# Patient Record
Sex: MALE | Race: WHITE | Employment: FULL TIME | URBAN - METROPOLITAN AREA
[De-identification: names, ages, dates, MRNs, and addresses within clinical notes are randomized per-mention and may not be internally consistent; named-entity substitution may affect disease eponyms.]

---

## 2018-03-07 ENCOUNTER — HOSPITAL ENCOUNTER (EMERGENCY)
Age: 33
Discharge: HOME | End: 2018-03-07
Attending: EMERGENCY MEDICINE
Payer: SELF-PAY

## 2018-03-07 VITALS
BODY MASS INDEX: 25.11 KG/M2 | HEIGHT: 67 IN | HEART RATE: 113 BPM | SYSTOLIC BLOOD PRESSURE: 141 MMHG | RESPIRATION RATE: 16 BRPM | WEIGHT: 160 LBS | DIASTOLIC BLOOD PRESSURE: 82 MMHG

## 2018-03-07 DIAGNOSIS — S01.01XA LACERATION OF SCALP, INITIAL ENCOUNTER: Primary | ICD-10-CM

## 2018-03-07 PROCEDURE — 77030008031

## 2018-03-07 PROCEDURE — 99282 EMERGENCY DEPT VISIT SF MDM: CPT | Performed by: EMERGENCY MEDICINE

## 2018-03-07 PROCEDURE — 75810000293 HC SIMP/SUPERF WND  RPR: Performed by: EMERGENCY MEDICINE

## 2018-03-08 NOTE — ED NOTES
Pt came back to department to \"get gloves he forgot\". Allowed me to give him his paperwork and explain removal schedule and s&sx of infection. I have reviewed discharge instructions with the patient. The patient verbalized understanding. Patient left ED via Discharge Method: ambulatory to Home with self. Opportunity for questions and clarification provided. Patient given 0 scripts. To continue your aftercare when you leave the hospital, you may receive an automated call from our care team to check in on how you are doing. This is a free service and part of our promise to provide the best care and service to meet your aftercare needs.  If you have questions, or wish to unsubscribe from this service please call [phone]. Thank you for Choosing our [facility].

## 2018-03-08 NOTE — ED NOTES
Pt was leaving as I approached with discharge papers. Pt states I came for staples and I got them that's all I want, I'm outta here. Asked pt to allow me to take vitals and give paperwork. Pt stated no and left ER.

## 2018-03-08 NOTE — ED PROVIDER NOTES
HPI Comments: Patient states he was struck on the head by a some loose electrical conduit earlier this afternoon. He denies loss of consciousness, states the area bled a moderate amount and stopped. He denies any nausea or vomiting, has only some mild pain at the site of incision. Elements of this note were created using speech recognition software. As such, errors of speech recognition may be present. Patient is a 26 y.o. male presenting with skin laceration. The history is provided by the patient. Laceration           History reviewed. No pertinent past medical history. Past Surgical History:   Procedure Laterality Date    HX TONSIL AND ADENOIDECTOMY           No family history on file. Social History     Social History    Marital status: SINGLE     Spouse name: N/A    Number of children: N/A    Years of education: N/A     Occupational History    Not on file. Social History Main Topics    Smoking status: Current Every Day Smoker     Packs/day: 0.25    Smokeless tobacco: Not on file    Alcohol use No    Drug use: Not on file    Sexual activity: No     Other Topics Concern    Not on file     Social History Narrative    No narrative on file         ALLERGIES: Pcn [penicillins]    Review of Systems   Constitutional: Negative for chills and fever. Gastrointestinal: Negative for nausea and vomiting. All other systems reviewed and are negative. Vitals:    03/07/18 1956   BP: 141/82   Pulse: (!) 113   Resp: 16   Weight: 72.6 kg (160 lb)   Height: 5' 7\" (1.702 m)            Physical Exam   Constitutional: He is oriented to person, place, and time. He appears well-developed and well-nourished. HENT:   Head: Normocephalic. 2 cm linear sharp laceration left parietal scalp as indicated   Eyes: Conjunctivae are normal. Pupils are equal, round, and reactive to light. Neck: Normal range of motion. Neck supple. Musculoskeletal: He exhibits no edema or tenderness. Neurological: He is alert and oriented to person, place, and time. Skin: Skin is warm and dry. Psychiatric: He has a normal mood and affect. His behavior is normal.   Nursing note and vitals reviewed. MDM  Number of Diagnoses or Management Options  Laceration of scalp, initial encounter: new and does not require workup  Risk of Complications, Morbidity, and/or Mortality  Presenting problems: moderate  Diagnostic procedures: moderate  Management options: moderate    Patient Progress  Patient progress: improved        ED Course       Wound Repair  Date/Time: 3/7/2018 9:52 PM  Performed by: attendingPreparation: skin prepped with ChloraPrep  Pre-procedure re-eval: Immediately prior to the procedure, the patient was reevaluated and found suitable for the planned procedure and any planned medications. Time out: Immediately prior to the procedure a time out was called to verify the correct patient, procedure, equipment, staff and marking as appropriate. .  Location details: scalp  Wound length:2.5 cm or less  Foreign bodies: no foreign bodies  Irrigation solution: saline  Irrigation method: syringe  Debridement: none  Skin closure: staples  Number of sutures: 3  Technique: simple  Approximation: close  Patient tolerance: Patient tolerated the procedure well with no immediate complications  My total time at bedside, performing this procedure was 1-15 minutes.

## 2018-03-08 NOTE — DISCHARGE INSTRUCTIONS
Cuts: Care Instructions  Your Care Instructions  A cut can happen anywhere on your body. Stitches, staples, skin adhesives, or pieces of tape called Steri-Strips are sometimes used to keep the edges of a cut together and help it heal. Steri-Strips can be used by themselves or with stitches or staples. Sometimes cuts are left open. If the cut went deep and through the skin, the doctor may have closed the cut in two layers. A deeper layer of stitches brings the deep part of the cut together. These stitches will dissolve and don't need to be removed. The upper layer closure, which could be stitches, staples, Steri-Strips, or adhesive, is what you see on the cut. A cut is often covered by a bandage. The doctor has checked you carefully, but problems can develop later. If you notice any problems or new symptoms, get medical treatment right away. Follow-up care is a key part of your treatment and safety. Be sure to make and go to all appointments, and call your doctor if you are having problems. It's also a good idea to know your test results and keep a list of the medicines you take. How can you care for yourself at home? If a cut is open or closed  · Prop up the sore area on a pillow anytime you sit or lie down during the next 3 days. Try to keep it above the level of your heart. This will help reduce swelling. · Keep the cut dry for the first 24 to 48 hours. After this, you can shower if your doctor okays it. Pat the cut dry. · Don't soak the cut, such as in a bathtub. Your doctor will tell you when it's safe to get the cut wet. · After the first 24 to 48 hours, clean the cut with soap and water 2 times a day unless your doctor gives you different instructions. ¨ Don't use hydrogen peroxide or alcohol, which can slow healing. ¨ You may cover the cut with a thin layer of petroleum jelly and a nonstick bandage.   ¨ If the doctor put a bandage over the cut, put on a new bandage after cleaning the cut or if the bandage gets wet or dirty. · Avoid any activity that could cause your cut to reopen. · Be safe with medicines. Read and follow all instructions on the label. ¨ If the doctor gave you a prescription medicine for pain, take it as prescribed. ¨ If you are not taking a prescription pain medicine, ask your doctor if you can take an over-the-counter medicine. If the cut is closed with stitches, staples, or Steri-Strips  · Follow the above instructions for open or closed cuts. · Do not remove the stitches or staples on your own. Your doctor will tell you when to come back to have the stitches or staples removed. · Leave Steri-Strips on until they fall off. If the cut is closed with a skin adhesive  · Follow the above instructions for open or closed cuts. · Leave the skin adhesive on your skin until it falls off on its own. This may take 5 to 10 days. · Do not scratch, rub, or pick at the adhesive. · Do not put the sticky part of a bandage directly on the adhesive. · Do not put any kind of ointment, cream, or lotion over the area. This can make the adhesive fall off too soon. Do not use hydrogen peroxide or alcohol, which can slow healing. When should you call for help? Call your doctor now or seek immediate medical care if:  ? · You have new pain, or your pain gets worse. ? · The skin near the cut is cold or pale or changes color. ? · You have tingling, weakness, or numbness near the cut.   ? · The cut starts to bleed, and blood soaks through the bandage. Oozing small amounts of blood is normal.   ? · You have trouble moving the area near the cut.   ? · You have symptoms of infection, such as:  ¨ Increased pain, swelling, warmth, or redness around the cut. ¨ Red streaks leading from the cut. ¨ Pus draining from the cut. ¨ A fever. ? Watch closely for changes in your health, and be sure to contact your doctor if:  ? · The cut reopens. ? · You do not get better as expected.    Where can you learn more? Go to http://laly-jurgen.info/. Enter M735 in the search box to learn more about \"Cuts: Care Instructions. \"  Current as of: March 20, 2017  Content Version: 11.4  © 7199-5753 [employer]. Care instructions adapted under license by [employer] (which disclaims liability or warranty for this information). If you have questions about a medical condition or this instruction, always ask your healthcare professional. [address] any warranty or liability for your use of this information.

## 2018-03-08 NOTE — ED TRIAGE NOTES
Pt presents to ER after he claims a pipe hit his L dorsal head at [phone] today at work, clotted hair w/ no active bleeding, Td UTD in 2015, pt denies any LOC.

## 2018-03-26 ENCOUNTER — HOSPITAL ENCOUNTER (INPATIENT)
Age: 33
LOS: 21 days | Discharge: HOME | DRG: 674 | End: 2018-04-16
Attending: EMERGENCY MEDICINE | Admitting: INTERNAL MEDICINE
Payer: SELF-PAY

## 2018-03-26 ENCOUNTER — APPOINTMENT (OUTPATIENT)
Dept: GENERAL RADIOLOGY | Age: 33
DRG: 674 | End: 2018-03-26
Attending: EMERGENCY MEDICINE
Payer: SELF-PAY

## 2018-03-26 ENCOUNTER — APPOINTMENT (OUTPATIENT)
Dept: ULTRASOUND IMAGING | Age: 33
DRG: 674 | End: 2018-03-26
Attending: INTERNAL MEDICINE
Payer: SELF-PAY

## 2018-03-26 DIAGNOSIS — N17.9 ACUTE RENAL FAILURE, UNSPECIFIED ACUTE RENAL FAILURE TYPE (HCC): Primary | ICD-10-CM

## 2018-03-26 PROBLEM — F11.10 HEROIN ABUSE (HCC): Status: ACTIVE | Noted: 2018-03-26

## 2018-03-26 PROBLEM — Z72.0 TOBACCO ABUSE: Status: ACTIVE | Noted: 2018-03-26

## 2018-03-26 LAB
ANION GAP SERPL CALC-SCNC: 14 MMOL/L (ref 7–16)
BUN SERPL-MCNC: 67 MG/DL (ref 6–23)
CALCIUM SERPL-MCNC: 8.5 MG/DL (ref 8.3–10.4)
CHLORIDE SERPL-SCNC: 97 MMOL/L (ref 98–107)
CK SERPL-CCNC: 1419 U/L (ref 21–215)
CO2 SERPL-SCNC: 25 MMOL/L (ref 21–32)
CREAT SERPL-MCNC: 4.92 MG/DL (ref 0.8–1.5)
ERYTHROCYTE [DISTWIDTH] IN BLOOD BY AUTOMATED COUNT: 12.5 % (ref 11.9–14.6)
GLUCOSE SERPL-MCNC: 96 MG/DL (ref 65–100)
HCT VFR BLD AUTO: 43.8 % (ref 41.1–50.3)
HGB BLD-MCNC: 15.9 G/DL (ref 13.6–17.2)
MCH RBC QN AUTO: 31.8 PG (ref 26.1–32.9)
MCHC RBC AUTO-ENTMCNC: 36.3 G/DL (ref 31.4–35)
MCV RBC AUTO: 87.6 FL (ref 79.6–97.8)
PLATELET # BLD AUTO: 148 K/UL (ref 150–450)
PMV BLD AUTO: 9.7 FL (ref 10.8–14.1)
POTASSIUM SERPL-SCNC: 4.4 MMOL/L (ref 3.5–5.1)
PROCALCITONIN SERPL-MCNC: 12.5 NG/ML
RBC # BLD AUTO: 5 M/UL (ref 4.23–5.67)
SODIUM SERPL-SCNC: 136 MMOL/L (ref 136–145)
WBC # BLD AUTO: 9.9 K/UL (ref 4.3–11.1)

## 2018-03-26 PROCEDURE — 96360 HYDRATION IV INFUSION INIT: CPT | Performed by: EMERGENCY MEDICINE

## 2018-03-26 PROCEDURE — 84145 PROCALCITONIN (PCT): CPT | Performed by: EMERGENCY MEDICINE

## 2018-03-26 PROCEDURE — 71045 X-RAY EXAM CHEST 1 VIEW: CPT

## 2018-03-26 PROCEDURE — 76770 US EXAM ABDO BACK WALL COMP: CPT

## 2018-03-26 PROCEDURE — 65270000029 HC RM PRIVATE

## 2018-03-26 PROCEDURE — 99285 EMERGENCY DEPT VISIT HI MDM: CPT | Performed by: EMERGENCY MEDICINE

## 2018-03-26 PROCEDURE — 74011250636 HC RX REV CODE- 250/636: Performed by: INTERNAL MEDICINE

## 2018-03-26 PROCEDURE — 82550 ASSAY OF CK (CPK): CPT | Performed by: EMERGENCY MEDICINE

## 2018-03-26 PROCEDURE — 80048 BASIC METABOLIC PNL TOTAL CA: CPT | Performed by: EMERGENCY MEDICINE

## 2018-03-26 PROCEDURE — 73140 X-RAY EXAM OF FINGER(S): CPT

## 2018-03-26 PROCEDURE — 36415 COLL VENOUS BLD VENIPUNCTURE: CPT | Performed by: EMERGENCY MEDICINE

## 2018-03-26 PROCEDURE — 74011250636 HC RX REV CODE- 250/636: Performed by: EMERGENCY MEDICINE

## 2018-03-26 PROCEDURE — 85027 COMPLETE CBC AUTOMATED: CPT | Performed by: EMERGENCY MEDICINE

## 2018-03-26 PROCEDURE — 74011250637 HC RX REV CODE- 250/637: Performed by: EMERGENCY MEDICINE

## 2018-03-26 RX ORDER — ONDANSETRON 2 MG/ML
4 INJECTION INTRAMUSCULAR; INTRAVENOUS
Status: DISCONTINUED | OUTPATIENT
Start: 2018-03-26 | End: 2018-04-16 | Stop reason: HOSPADM

## 2018-03-26 RX ORDER — ONDANSETRON 4 MG/1
4 TABLET, ORALLY DISINTEGRATING ORAL
Status: COMPLETED | OUTPATIENT
Start: 2018-03-26 | End: 2018-03-26

## 2018-03-26 RX ORDER — ACETAMINOPHEN 325 MG/1
650 TABLET ORAL
Status: DISCONTINUED | OUTPATIENT
Start: 2018-03-26 | End: 2018-04-16 | Stop reason: HOSPADM

## 2018-03-26 RX ORDER — SODIUM CHLORIDE 0.9 % (FLUSH) 0.9 %
5-10 SYRINGE (ML) INJECTION EVERY 8 HOURS
Status: DISCONTINUED | OUTPATIENT
Start: 2018-03-26 | End: 2018-04-16 | Stop reason: HOSPADM

## 2018-03-26 RX ORDER — SODIUM CHLORIDE 9 MG/ML
250 INJECTION, SOLUTION INTRAVENOUS CONTINUOUS
Status: DISCONTINUED | OUTPATIENT
Start: 2018-03-26 | End: 2018-03-27

## 2018-03-26 RX ORDER — HEPARIN SODIUM 5000 [USP'U]/ML
5000 INJECTION, SOLUTION INTRAVENOUS; SUBCUTANEOUS EVERY 8 HOURS
Status: DISCONTINUED | OUTPATIENT
Start: 2018-03-26 | End: 2018-04-16 | Stop reason: HOSPADM

## 2018-03-26 RX ORDER — SODIUM CHLORIDE 0.9 % (FLUSH) 0.9 %
5-10 SYRINGE (ML) INJECTION AS NEEDED
Status: DISCONTINUED | OUTPATIENT
Start: 2018-03-26 | End: 2018-04-16 | Stop reason: HOSPADM

## 2018-03-26 RX ORDER — SODIUM CHLORIDE, SODIUM LACTATE, POTASSIUM CHLORIDE, CALCIUM CHLORIDE 600; 310; 30; 20 MG/100ML; MG/100ML; MG/100ML; MG/100ML
150 INJECTION, SOLUTION INTRAVENOUS CONTINUOUS
Status: DISCONTINUED | OUTPATIENT
Start: 2018-03-26 | End: 2018-03-27

## 2018-03-26 RX ORDER — SODIUM CHLORIDE 9 MG/ML
1000 INJECTION, SOLUTION INTRAVENOUS ONCE
Status: COMPLETED | OUTPATIENT
Start: 2018-03-26 | End: 2018-03-26

## 2018-03-26 RX ADMIN — HEPARIN SODIUM 5000 UNITS: 5000 INJECTION, SOLUTION INTRAVENOUS; SUBCUTANEOUS at 22:53

## 2018-03-26 RX ADMIN — Medication 10 ML: at 22:54

## 2018-03-26 RX ADMIN — ONDANSETRON 4 MG: 4 TABLET, ORALLY DISINTEGRATING ORAL at 08:49

## 2018-03-26 RX ADMIN — SODIUM CHLORIDE 1000 ML: 900 INJECTION, SOLUTION INTRAVENOUS at 08:48

## 2018-03-26 RX ADMIN — SODIUM CHLORIDE, SODIUM LACTATE, POTASSIUM CHLORIDE, AND CALCIUM CHLORIDE 150 ML/HR: 600; 310; 30; 20 INJECTION, SOLUTION INTRAVENOUS at 11:49

## 2018-03-26 RX ADMIN — SODIUM CHLORIDE, SODIUM LACTATE, POTASSIUM CHLORIDE, AND CALCIUM CHLORIDE 150 ML/HR: 600; 310; 30; 20 INJECTION, SOLUTION INTRAVENOUS at 22:54

## 2018-03-26 NOTE — IP AVS SNAPSHOT
[address] 
[phone] Patient: Ashlee Marcus 
MRN: [id number] [facility]:[birth year]15 About your hospitalization You were admitted on:  March 26, 2018 You last received care in the:  [facility] 6 MED SURG You were discharged on:  April 16, 2018 Why you were hospitalized Your primary diagnosis was:  Arf (Acute Renal Failure) (Hcc) Your diagnoses also included:  Heroin Abuse, Tobacco Abuse, Hepatitis, Rhabdomyolysis, Chronic Hepatitis C Without Hepatic Coma (Hcc), Gerd (Gastroesophageal Reflux Disease) Follow-up Information Follow up With Details Comments Contact Info [address] [place] [address]   [phone] 
call on 4/18/18 to let them know you would like to come back [facility]  one week folow up appt Ericajuan Kauffman [phone] 
[phone] Discharge Orders Procedure Order Date Status Priority Quantity Spec Type Associated Dx DISCHARGE INSTRUCTIONS 04/16/18 0736 Normal Routine 1 Comments: If worsened, call your doctor or return to hospital.  
When follow up with your doctor, make sure that your doctor is aware of this admission and review hospital record and follow up on lab results for continuity of care. Also, review your medications with your doctor for possible need of adjustment or refills. Drink good amount of water daily (At least 2-3 quarts per day). A check estelle indicates which time of day the medication should be taken. My Medications START taking these medications Instructions Each Dose to Equal  
 Morning Noon Evening Bedtime  
 amLODIPine 10 mg tablet Commonly known as:  Pooja Attila Your next dose is:  4-17 Take 1 Tab by mouth daily for 15 days. Indications: hypertension 10 mg  
    
   
   
   
  
 sertraline 25 mg tablet Commonly known as:  ZOLOFT Your next dose is:  4-17 Take 1 Tab by mouth daily for 15 days. Indications: Generalized Anxiety Disorder 25 mg Where to Get Your Medications Information on where to get these meds will be given to you by the nurse or doctor. ! Ask your nurse or doctor about these medications  
  amLODIPine 10 mg tablet  
 sertraline 25 mg tablet Discharge Instructions DISCHARGE SUMMARY from Nurse PATIENT INSTRUCTIONS: 
 
 
F-face looks uneven A-arms unable to move or move unevenly S-speech slurred or non-existent T-time-call 911 as soon as signs and symptoms begin-DO NOT go Back to bed or wait to see if you get better-TIME IS BRAIN. Warning Signs of HEART ATTACK Call 911 if you have these symptoms: 
? Chest discomfort. Most heart attacks involve discomfort in the center of the chest that lasts more than a few minutes, or that goes away and comes back. It can feel like uncomfortable pressure, squeezing, fullness, or pain. ? Discomfort in other areas of the upper body. Symptoms can include pain or discomfort in one or both arms, the back, neck, jaw, or stomach. ? Shortness of breath with or without chest discomfort. ? Other signs may include breaking out in a cold sweat, nausea, or lightheadedness. Don't wait more than five minutes to call [address]! Fast action can save your life. Calling 911 is almost always the fastest way to get lifesaving treatment. Emergency Medical Services staff can begin treatment when they arrive  up to an hour sooner than if someone gets to the hospital by car. The discharge information has been reviewed with the patient.   The patient verbalized understanding. Discharge medications reviewed with the patient and appropriate educational materials and side effects teaching were provided. ___________________________________________________________________________________________________________________________________ MyChart Announcement We are excited to announce that we are making your provider's discharge notes available to you in Triparazzi. You will see these notes when they are completed and signed by the physician that discharged you from your recent hospital stay. If you have any questions or concerns about any information you see in Triparazzi, please call the Health Information Department where you were seen or reach out to your Primary Care Provider for more information about your plan of care. Introducing [facility]! Alec Rose introduces Triparazzi patient portal. Now you can access parts of your medical record, email your doctor's office, and request medication refills online. 1. In your internet browser, go to [email or web address] 2. Click on the First Time User? Click Here link in the Sign In box. You will see the New Member Sign Up page. 3. Enter your Triparazzi Access Code exactly as it appears below. You will not need to use this code after youve completed the sign-up process. If you do not sign up before the expiration date, you must request a new code. · Triparazzi Access Code: [email or web address] Expires: 6/5/2018  8:43 PM 
 
4. Enter the last four digits of your Social Security Number (xxxx) and Date of Birth (mm/dd/yyyy) as indicated and click Submit. You will be taken to the next sign-up page. 5. Create a Railpodt ID. This will be your Triparazzi login ID and cannot be changed, so think of one that is secure and easy to remember. 6. Create a Triparazzi password. You can change your password at any time. 7. Enter your Password Reset Question and Answer.  This can be used at a later time if you forget your password. 8. Enter your e-mail address. You will receive e-mail notification when new information is available in [address] 9. Click Sign Up. You can now view and download portions of your medical record. 10. Click the Download Summary menu link to download a portable copy of your medical information. If you have questions, please visit the Frequently Asked Questions section of the MicroEdget website. Remember, [employer] is NOT to be used for urgent needs. For medical emergencies, dial 911. Now available from your iPhone and Android! Introducing Asher Rogers As a [facility] patient, I wanted to make you aware of our electronic visit tool called Asher Rogers. [employer] allows you to connect within minutes with a medical provider 24 hours a day, seven days a week via a mobile device or tablet or logging into a secure website from your computer. You can access Asher Rogers from anywhere in the [place]. A virtual visit might be right for you when you have a simple condition and feel like you just dont want to get out of bed, or cant get away from work for an appointment, when your regular [facility] provider is not available (evenings, weekends or holidays), or when youre out of town and need minor care. Electronic visits cost only $49 and if the [employer] provider determines a prescription is needed to treat your condition, one can be electronically transmitted to a nearby pharmacy*. Please take a moment to enroll today if you have not already done so. The enrollment process is free and takes just a few minutes. To enroll, please download the [employer] erasto to your tablet or phone, or visit [email or web address] to enroll on your computer.    
And, as an [address] [place] [address] patient with a interspireSubmit account, the results of your visits will be scanned into your electronic medical record and your primary care provider will be able to view the scanned results. We urge you to continue to see your regular Rebecca Bussin provider for your ongoing medical care. And while your primary care provider may not be the one available when you seek a RealPage virtual visit, the peace of mind you get from getting a real diagnosis real time can be priceless. For more information on RealPage, view our Frequently Asked Questions (FAQs) at [email or web address]. Sincerely, 
 
Alesia Sibley MD 
Chief Medical Officer [employer] *:  certain medications cannot be prescribed via RealPage Providers Seen During Your Hospitalization Provider Specialty Primary office phone Adam Page MD Emergency Medicine [phone] Roopa Penaloza MD Internal Medicine [phone] Immunizations Administered for This Admission Name Date  
 TB Skin Test (PPD) Intradermal  Deferred (),  Deferred (), 3/30/2018 Your Primary Care Physician (PCP) Primary Care Physician Office Phone Office Fax NONE ** None ** ** None ** You are allergic to the following Allergen Reactions Pcn (Penicillins) Anaphylaxis Recent Documentation Height Weight BMI Smoking Status 1.702 m 67 kg 23.15 kg/m2 Current Every Day Smoker Emergency Contacts Name Discharge Info Relation Home Work Mobile [facility]  Sister [23] [phone] Patient Belongings The following personal items are in your possession at time of discharge: 
  Dental Appliances: None         Home Medications: None   Jewelry: None  Clothing: At bedside, Footwear, Pants, Shirt    Other Valuables: Pocket knife, With patient Discharge Instructions Attachments/References RENAL FAILURE: ACUTE (ENGLISH) Patient Handouts Acute Kidney Injury: Care Instructions Your Care Instructions Acute kidney injury (AISSATOU) is a sudden decrease in kidney function. This can happen over a period of hours, days or, in some cases, weeks. AISSATOU used to be called acute renal failure, but kidney failure doesn't always happen with AISSATOU. Common causes of AISSATOU are dehydration, blood loss, and medicines. When AISSATOU happens, the kidneys have trouble removing waste and excess fluids from the body. The waste and fluids build up and become harmful. Kidney function may return to normal if the cause of AISSATOU is treated quickly. Your chance of a full recovery depends on what caused the problem, how quickly the cause was treated, and what other medical problems you have. A machine may be used to help your kidneys remove waste and fluids for a short period of time. This is called dialysis. Follow-up care is a key part of your treatment and safety. Be sure to make and go to all appointments, and call your doctor if you are having problems. It's also a good idea to know your test results and keep a list of the medicines you take. How can you care for yourself at home? · Talk to your doctor about how much fluid you should drink. · Eat a balanced diet. Talk to your doctor or a dietitian about what type of diet may be best for you. You may need to limit sodium, potassium, and phosphorus. · If you need dialysis, follow the instructions and schedule for dialysis that your doctor gives you. · Do not smoke. Smoking can make your condition worse. If you need help quitting, talk to your doctor about stop-smoking programs and medicines. These can increase your chances of quitting for good. · Do not drink alcohol. · Review all of your medicines with your doctor. Do not take any medicines, including nonsteroidal anti-inflammatory drugs (NSAIDs) such as ibuprofen (Advil, Motrin) or naproxen (Aleve), unless your doctor says it is safe for you to do so. · Make sure that anyone treating you for any health problem knows that you have had AISSATOU. When should you call for help? Call 911 anytime you think you may need emergency care. For example, call if: 
? · You passed out (lost consciousness). ?Call your doctor now or seek immediate medical care if: 
? · You have new or worse nausea and vomiting. ? · You have much less urine than normal, or you have no urine. ? · You are feeling confused or cannot think clearly. ? · You have new or more blood in your urine. ? · You have new swelling. ? · You are dizzy or lightheaded, or feel like you may faint. ? Watch closely for changes in your health, and be sure to contact your doctor if: 
? · You do not get better as expected. Where can you learn more? Go to http://laly-jurgen.info/. Enter I391 in the search box to learn more about \"Acute Kidney Injury: Care Instructions. \" Current as of: May 12, 2017 Content Version: 11.4 © 7477-7660 [employer]. Care instructions adapted under license by [employer] (which disclaims liability or warranty for this information). If you have questions about a medical condition or this instruction, always ask your healthcare professional. [address] any warranty or liability for your use of this information. Please provide this summary of care documentation to your next provider. Signatures-by signing, you are acknowledging that this After Visit Summary has been reviewed with you and you have received a copy. Patient Signature:  ____________________________________________________________ Date:  ____________________________________________________________  
  
[facility] Provider Signature:  ____________________________________________________________ Date:  ____________________________________________________________

## 2018-03-26 NOTE — PROGRESS NOTES
Met with patient at bedside. Patient is alert and oriented. Patient stated he was living with a friend but now does not plan on returning to friend's home at discharge. CM asked patient where he was planning to go at discharge and he stated, \"I don't know right now, it's up in the air. \" CM asked patient if he would like information regarding drug rehab facilities and patient stated that he did. Provided patient with information for the following programs:    [facility], [facility], [facility]. Pt stated he has called [facility] several times at 8:30am and has not received much help. Patient to review all program information provided and CM will follow up tomorrow.

## 2018-03-26 NOTE — PROGRESS NOTES
TRANSFER - IN REPORT:    Verbal report received from ELEONORA Cai on Maia Aponte  being received from ED for routine progression of care      Report consisted of patients Situation, Background, Assessment and   Recommendations(SBAR). Information from the following report(s) SBAR, Kardex and ED Summary was reviewed with the receiving nurse. Opportunity for questions and clarification was provided. Assessment completed upon patients arrival to unit and care assumed.
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
## 2018-03-26 NOTE — ED NOTES
TRANSFER - OUT REPORT:    Verbal report given to 6th floor nurse on Benoit Ingram  being transferred to 6th floor for routine progression of care       Report consisted of patients Situation, Background, Assessment and   Recommendations(SBAR). Information from the following report(s) ED Summary was reviewed with the receiving nurse. Lines:   Peripheral IV 03/26/18 Right Antecubital (Active)        Opportunity for questions and clarification was provided.       Patient transported with:

## 2018-03-26 NOTE — H&P
HOSPITALIST H&P/CONSULT  NAME:  Henry Plummer   Age:  26 y.o.  :   1985   MRN:   [id number]  PCP: None  Consulting MD:  Treatment Team: Attending Provider: Elizabeth Bailey MD; Primary Nurse: Mya Vallejo RN; Primary Nurse: Kary Fofana  HPI:   Patient is a 26 yom with a hx of iv drug abuse who presents with 2 day hx of malaise associated with nausea. States symptoms began after injecting heroin. Denies any fevers, cp, sob. Notes anxiety. In ER pt noted to have ARF with Scr of 4.92. Pt has not made urine in 2 days per pt. Cbc, UA pending    Complete ROS done and is as stated in HPI or otherwise negative  History reviewed. No pertinent past medical history. Past Surgical History:   Procedure Laterality Date    HX TONSIL AND ADENOIDECTOMY        None     Allergies   Allergen Reactions    Pcn [Penicillins] Anaphylaxis      Social History   Substance Use Topics    Smoking status: Current Every Day Smoker     Packs/day: 0.25    Smokeless tobacco: Not on file    Alcohol use No      History reviewed. No pertinent family history. Objective:     Visit Vitals    /70    Pulse 70    Temp 98.2 °F (36.8 °C)    Resp 16    SpO2 97%      Temp (24hrs), Av.2 °F (36.8 °C), Min:98.2 °F (36.8 °C), Max:98.2 °F (36.8 °C)    Oxygen Therapy  O2 Sat (%): 97 % (18 0849)  Pulse via Oximetry: 70 beats per minute (18 0849)  O2 Device: Room air (18 0859)  Physical Exam:  General:    Alert, cooperative, no distress, appears stated age. Head:   Normocephalic, without obvious abnormality, atraumatic. Nose:  Nares normal. No drainage or sinus tenderness. Lungs:   Clear to auscultation bilaterally. No Wheezing or Rhonchi. No rales. Heart:   Regular rate and rhythm,  no murmur, rub or gallop. Abdomen:   Soft, non-tender. Not distended. Bowel sounds normal.   Extremities: No cyanosis. No edema. No clubbing  Skin:     Texture, turgor normal. No rashes or lesions.   Not Jaundiced  Neurologic: Alert and oriented x 3, no focal deficits   Data Review:   Recent Results (from the past 24 hour(s))   METABOLIC PANEL, BASIC    Collection Time: 03/26/18  8:31 AM   Result Value Ref Range    Sodium 136 136 - 145 mmol/L    Potassium 4.4 3.5 - 5.1 mmol/L    Chloride 97 (L) 98 - 107 mmol/L    CO2 25 21 - 32 mmol/L    Anion gap 14 7 - 16 mmol/L    Glucose 96 65 - 100 mg/dL    BUN 67 (H) 6 - 23 MG/DL    Creatinine 4.92 (H) 0.8 - 1.5 MG/DL    GFR est AA 17 (L) >60 ml/min/1.73m2    GFR est non-AA 14 (L) >60 ml/min/1.73m2    Calcium 8.5 8.3 - 10.4 MG/DL   CK    Collection Time: 03/26/18  8:31 AM   Result Value Ref Range    CK 1419 (H) 21 - 215 U/L     Imaging /Procedures /Studies     Assessment and Plan: Active Hospital Problems    Diagnosis Date Noted    ARF (acute renal failure) ([facility] [address] 03/26/2018    Heroin abuse 03/26/2018    Tobacco abuse 03/26/2018       PLAN  ·  admit to inpatient  · LR @ 150 cc/hr  · Daily bmp  · Intake/output  · Check renal US  · If no improvement consult nephrology in am.  Electrolytes currently stable   · SW for drug rehab options.   Pt requesting help    Code Status: full    Anticipated discharge: 4 days    Signed By: Doris Hamilton MD     March 26, 2018

## 2018-03-26 NOTE — PROGRESS NOTES
Initial visit to assess pt's spiritual needs. Pt was sleeping,  left a card.       Chaplain Nora Nam MDiv,ThM,PhD

## 2018-03-26 NOTE — PROGRESS NOTES
Pt to room 624 via stretcher from ED. Pt oriented to room and call light, verbalized understanding. Dual skin assessment done by this RN and Tong Rojas RN scabs noted to BUE as well as tattoos. No other breakdown noted. Pt informed on need for urine sample. Pt states he has not voided in over 24 hours. All needs met at this time.
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
## 2018-03-26 NOTE — IP AVS SNAPSHOT
Summary of Care Report The Summary of Care report has been created to help improve care coordination. Users with access to [email or web address] or [employer] (Web-based application) may access additional patient information including the Discharge Summary. If you are not currently a [employer] user and need more information, please call the number listed below in the [place] [address] section and ask to be connected with Medical Records. Facility Information Name Address Phone [address] [address] [address] [phone] Patient Information Patient Name Sex ANEL Guerin ([id number]) Male 1985 Discharge Information Admitting Provider Service Area Unit aMlick Sandoval MD / [id number] Med Surg / [phone] Discharge Provider Discharge Date/Time Discharge Disposition Destination (none) 2018 (Pending) OTH (none) Patient Language Language ENGLISH [13] Hospital Problems as of 2018  Reviewed: 3/29/2018  4:58 PM by Elizabeth Shelley MD  
  
  
  
 Class Noted - Resolved Last Modified POA Active Problems * (Principal)ARF (acute renal failure) ([facility] [address]  3/26/2018 - Present 3/26/2018 by Malick Sandoval MD Yes Entered by Malick Sandoval MD  
  Heroin abuse  3/26/2018 - Present 3/26/2018 by Malick Sandoval MD Yes Entered by Malick Sandoval MD  
  Tobacco abuse  3/26/2018 - Present 3/26/2018 by Malick Sandoval MD Yes Entered by Malick Sandoval MD  
  Hepatitis  3/27/2018 - Present 3/27/2018 by Elizabeth Shelley MD Yes Entered by Elizabeth Shelley MD  
  Rhabdomyolysis  3/27/2018 - Present 3/27/2018 by Elizabeth Shelley MD Yes   Entered by Elizabeth Shelley MD  
  Chronic hepatitis C without hepatic coma ([facility] [address]  3/29/2018 - Present 3/29/2018 by Cristian Doty MD Unknown Entered by Cristian Doty MD  
  GERD (gastroesophageal reflux disease)  4/3/2018 - Present 4/3/2018 by Marylen Hard, MD Unknown Entered by Marylen Hard, MD  
  
Non-Hospital Problems as of 4/16/2018  Reviewed: 3/29/2018  4:58 PM by Cristian Doty MD  
 None You are allergic to the following Allergen Reactions Pcn (Penicillins) Anaphylaxis Current Discharge Medication List  
  
START taking these medications Dose & Instructions Dispensing Information Comments  
 amLODIPine 10 mg tablet Commonly known as:  Kirk Lute Dose:  10 mg Take 1 Tab by mouth daily for 15 days. Indications: hypertension Quantity:  15 Tab Refills:  0  
   
 sertraline 25 mg tablet Commonly known as:  ZOLOFT Dose:  25 mg Take 1 Tab by mouth daily for 15 days. Indications: Generalized Anxiety Disorder Quantity:  15 Tab Refills:  0 Current Immunizations Name Date  
 TB Skin Test (PPD) Intradermal  Deferred (),  Deferred (), 3/30/2018 Surgery Information ID Date/Time Status Primary Surgeon All Procedures Location [id number] 3/29/2018 Unposted   [facility] RAD ANGIO IR OR-DO NOT SCHEDULE    
 [id number] 4/6/2018 Unposted   [facility] RAD ANGIO IR OR-DO NOT SCHEDULE Follow-up Information Follow up With Details Comments Contact Info [address] [place] [address]   [phone] 
call on 4/18/18 to let them know you would like to come back [facility]  one week folow up appt Gabrielle Bales [phone] 
[phone] Discharge Instructions DISCHARGE SUMMARY from Nurse PATIENT INSTRUCTIONS: 
 
 
Recognize signs and symptoms of STROKE: 
 
 F-face looks uneven A-arms unable to move or move unevenly S-speech slurred or non-existent T-time-call 911 as soon as signs and symptoms begin-DO NOT go Back to bed or wait to see if you get better-TIME IS BRAIN. Warning Signs of HEART ATTACK Call 911 if you have these symptoms: 
? Chest discomfort. Most heart attacks involve discomfort in the center of the chest that lasts more than a few minutes, or that goes away and comes back. It can feel like uncomfortable pressure, squeezing, fullness, or pain. ? Discomfort in other areas of the upper body. Symptoms can include pain or discomfort in one or both arms, the back, neck, jaw, or stomach. ? Shortness of breath with or without chest discomfort. ? Other signs may include breaking out in a cold sweat, nausea, or lightheadedness. Don't wait more than five minutes to call [address]! Fast action can save your life. Calling 911 is almost always the fastest way to get lifesaving treatment. Emergency Medical Services staff can begin treatment when they arrive  up to an hour sooner than if someone gets to the hospital by car. The discharge information has been reviewed with the patient. The patient verbalized understanding. Discharge medications reviewed with the patient and appropriate educational materials and side effects teaching were provided. ___________________________________________________________________________________________________________________________________ Chart Review Routing History No Routing History on File

## 2018-03-27 PROBLEM — M62.82 RHABDOMYOLYSIS: Status: ACTIVE | Noted: 2018-03-27

## 2018-03-27 PROBLEM — K75.9 HEPATITIS: Status: ACTIVE | Noted: 2018-03-27

## 2018-03-27 LAB
ALBUMIN SERPL-MCNC: 3 G/DL (ref 3.5–5)
ALBUMIN/GLOB SERPL: 1.2 {RATIO} (ref 1.2–3.5)
ALP SERPL-CCNC: 67 U/L (ref 50–136)
ALT SERPL-CCNC: 4213 U/L (ref 12–65)
AMORPH CRY URNS QL MICRO: ABNORMAL
ANION GAP SERPL CALC-SCNC: 14 MMOL/L (ref 7–16)
APPEARANCE UR: ABNORMAL
AST SERPL-CCNC: 1265 U/L (ref 15–37)
BACTERIA URNS QL MICRO: 0 /HPF
BILIRUB DIRECT SERPL-MCNC: 0.5 MG/DL
BILIRUB SERPL-MCNC: 1 MG/DL (ref 0.2–1.1)
BILIRUB UR QL: ABNORMAL
BUN SERPL-MCNC: 85 MG/DL (ref 6–23)
CALCIUM SERPL-MCNC: 8.5 MG/DL (ref 8.3–10.4)
CHLORIDE SERPL-SCNC: 100 MMOL/L (ref 98–107)
CHLORIDE UR-SCNC: 65 MMOL/L
CO2 SERPL-SCNC: 23 MMOL/L (ref 21–32)
COLOR UR: ABNORMAL
CREAT SERPL-MCNC: 7.72 MG/DL (ref 0.8–1.5)
CREAT UR-MCNC: 83.7 MG/DL
EPI CELLS #/AREA URNS HPF: ABNORMAL /HPF
GLOBULIN SER CALC-MCNC: 2.5 G/DL (ref 2.3–3.5)
GLUCOSE SERPL-MCNC: 102 MG/DL (ref 65–100)
GLUCOSE UR STRIP.AUTO-MCNC: 250 MG/DL
HGB UR QL STRIP: ABNORMAL
KETONES UR QL STRIP.AUTO: ABNORMAL MG/DL
LEUKOCYTE ESTERASE UR QL STRIP.AUTO: ABNORMAL
NITRITE UR QL STRIP.AUTO: NEGATIVE
PH UR STRIP: 6.5 [PH] (ref 5–9)
PHOSPHATE SERPL-MCNC: 5.3 MG/DL (ref 2.5–4.5)
POTASSIUM SERPL-SCNC: 4.1 MMOL/L (ref 3.5–5.1)
POTASSIUM UR-SCNC: 21 MMOL/L
PROT SERPL-MCNC: 5.5 G/DL (ref 6.3–8.2)
PROT UR STRIP-MCNC: 300 MG/DL
PROT UR-MCNC: 524 MG/DL
RBC #/AREA URNS HPF: >100 /HPF
SODIUM SERPL-SCNC: 137 MMOL/L (ref 136–145)
SODIUM UR-SCNC: 80 MMOL/L
SP GR UR REFRACTOMETRY: 1.01 (ref 1–1.02)
UROBILINOGEN UR QL STRIP.AUTO: 1 EU/DL (ref 0.2–1)
WBC URNS QL MICRO: ABNORMAL /HPF

## 2018-03-27 PROCEDURE — 81001 URINALYSIS AUTO W/SCOPE: CPT | Performed by: EMERGENCY MEDICINE

## 2018-03-27 PROCEDURE — 82436 ASSAY OF URINE CHLORIDE: CPT | Performed by: INTERNAL MEDICINE

## 2018-03-27 PROCEDURE — 36415 COLL VENOUS BLD VENIPUNCTURE: CPT | Performed by: INTERNAL MEDICINE

## 2018-03-27 PROCEDURE — 82570 ASSAY OF URINE CREATININE: CPT | Performed by: INTERNAL MEDICINE

## 2018-03-27 PROCEDURE — 84100 ASSAY OF PHOSPHORUS: CPT | Performed by: INTERNAL MEDICINE

## 2018-03-27 PROCEDURE — 84300 ASSAY OF URINE SODIUM: CPT | Performed by: INTERNAL MEDICINE

## 2018-03-27 PROCEDURE — 84133 ASSAY OF URINE POTASSIUM: CPT | Performed by: INTERNAL MEDICINE

## 2018-03-27 PROCEDURE — 80048 BASIC METABOLIC PNL TOTAL CA: CPT | Performed by: INTERNAL MEDICINE

## 2018-03-27 PROCEDURE — 84156 ASSAY OF PROTEIN URINE: CPT | Performed by: INTERNAL MEDICINE

## 2018-03-27 PROCEDURE — 74011000258 HC RX REV CODE- 258: Performed by: INTERNAL MEDICINE

## 2018-03-27 PROCEDURE — 65270000029 HC RM PRIVATE

## 2018-03-27 PROCEDURE — 77030034849

## 2018-03-27 PROCEDURE — 80076 HEPATIC FUNCTION PANEL: CPT | Performed by: INTERNAL MEDICINE

## 2018-03-27 PROCEDURE — 74011250636 HC RX REV CODE- 250/636: Performed by: INTERNAL MEDICINE

## 2018-03-27 PROCEDURE — 74011250637 HC RX REV CODE- 250/637: Performed by: INTERNAL MEDICINE

## 2018-03-27 RX ORDER — DEXTROSE MONOHYDRATE AND SODIUM CHLORIDE 5; .45 G/100ML; G/100ML
150 INJECTION, SOLUTION INTRAVENOUS CONTINUOUS
Status: DISCONTINUED | OUTPATIENT
Start: 2018-03-27 | End: 2018-03-30

## 2018-03-27 RX ADMIN — ACETAMINOPHEN 650 MG: 500 TABLET, FILM COATED ORAL at 00:38

## 2018-03-27 RX ADMIN — Medication 10 ML: at 05:09

## 2018-03-27 RX ADMIN — HEPARIN SODIUM 5000 UNITS: 5000 INJECTION, SOLUTION INTRAVENOUS; SUBCUTANEOUS at 06:02

## 2018-03-27 RX ADMIN — DEXTROSE MONOHYDRATE AND SODIUM CHLORIDE 150 ML/HR: 5; .45 INJECTION, SOLUTION INTRAVENOUS at 19:22

## 2018-03-27 RX ADMIN — Medication 10 ML: at 14:46

## 2018-03-27 RX ADMIN — HEPARIN SODIUM 5000 UNITS: 5000 INJECTION, SOLUTION INTRAVENOUS; SUBCUTANEOUS at 14:43

## 2018-03-27 RX ADMIN — ACETAMINOPHEN 650 MG: 500 TABLET, FILM COATED ORAL at 05:07

## 2018-03-27 RX ADMIN — SODIUM CHLORIDE, SODIUM LACTATE, POTASSIUM CHLORIDE, AND CALCIUM CHLORIDE 150 ML/HR: 600; 310; 30; 20 INJECTION, SOLUTION INTRAVENOUS at 05:00

## 2018-03-27 RX ADMIN — HEPARIN SODIUM 5000 UNITS: 5000 INJECTION, SOLUTION INTRAVENOUS; SUBCUTANEOUS at 21:19

## 2018-03-27 RX ADMIN — Medication 10 ML: at 21:21

## 2018-03-27 RX ADMIN — DEXTROSE MONOHYDRATE AND SODIUM CHLORIDE 150 ML/HR: 5; .45 INJECTION, SOLUTION INTRAVENOUS at 09:00

## 2018-03-27 NOTE — PROGRESS NOTES
Choudhury place this this RN and Tuyet English RN. Pt tolerated well. Small amount of urine return. Urine dark lida in color. Urine specimen sent from cb.

## 2018-03-27 NOTE — CONSULTS
Renal Consult    Subjective:     Regla Cat is a 26 y.o.  male who is being seen for AISSATOU. He came to the ER with nausea/vomitting. And orthostasis. Noted AISSATOU with a cr of 4.5, today up to 7, with decreased urine. He has almost daily use of goody powders. He has been using iv heroin for a couple of months, but doesn't share needles. Also has had some methamphetamine use. No hx of CKD but no hx of medical care. He is getting IVF. Still with minimal urine but still looks dry. Urinalysis with multiple findings. Proteinuria, hematuria, ketones, glucose, RBC and WBC. Urine sodium is high. No fever, chills, sweats, epistaxis, vision changes, headache, shortness of breath, wheezing,  chest pain, palpitations. No nausea/vomitting, diarrhea or constipation. No dysuria, hematuria. No paresthesia, seizure history, no arthritis/ arthralgia or skin rashes. No hx of hepatitis or HIV. HIV was negative a year a go. History reviewed. No pertinent past medical history. Past Surgical History:   Procedure Laterality Date    HX TONSIL AND ADENOIDECTOMY       History reviewed. No pertinent family history.    Social History   Substance Use Topics    Smoking status: Current Every Day Smoker     Packs/day: 0.25    Smokeless tobacco: Not on file    Alcohol use No       Current Facility-Administered Medications   Medication Dose Route Frequency Provider Last Rate Last Dose    dextrose 5 % - 0.45% NaCl infusion  150 mL/hr IntraVENous CONTINUOUS Nely Michaels  mL/hr at 03/27/18 0900 150 mL/hr at 03/27/18 0900    sodium chloride (NS) flush 5-10 mL  5-10 mL IntraVENous Q8H Grace Myers MD   10 mL at 03/27/18 0509    sodium chloride (NS) flush 5-10 mL  5-10 mL IntraVENous PRN Grace Myers MD        acetaminophen (TYLENOL) tablet 650 mg  650 mg Oral Q4H PRN Grace Myers MD   650 mg at 03/27/18 0507    ondansetron (ZOFRAN) injection 4 mg  4 mg IntraVENous Q4H PRN Jaz Suárez Jimbo Wan MD        heparin (porcine) injection 5,000 Units  5,000 Units SubCUTAneous Q8H Raven Sow MD   5,000 Units at 03/27/18 0602        Allergies   Allergen Reactions    Pcn [Penicillins] Anaphylaxis        Review of Systems:  A comprehensive review of systems was negative except for that written in the History of Present Illness. Objective: Intake and Output:    03/27 0701 - 03/27 1900  In: 240 [P.O.:240]  Out: -        Physical Exam:   General appearance: no distress, appears stated age  Head: atraumatic  Eyes: conjunctivae/corneas clear. Nose: no discharge  Throat: Lips, mucosa dry   Neck: supple, symmetrical, trachea midline and no JVD  Lungs: clear to auscultation bilaterally  Heart: regular rate and rhythm, S1, S2, no pericardial friction rub  Abdomen: soft, non-tender.  Bowel sounds normal.   Extremities: No edema    Skin:  No rashes or lesions         Data Review:   Recent Results (from the past 24 hour(s))   METABOLIC PANEL, BASIC    Collection Time: 03/27/18  5:40 AM   Result Value Ref Range    Sodium 137 136 - 145 mmol/L    Potassium 4.1 3.5 - 5.1 mmol/L    Chloride 100 98 - 107 mmol/L    CO2 23 21 - 32 mmol/L    Anion gap 14 7 - 16 mmol/L    Glucose 102 (H) 65 - 100 mg/dL    BUN 85 (H) 6 - 23 MG/DL    Creatinine 7.72 (H) 0.8 - 1.5 MG/DL    GFR est AA 10 (L) >60 ml/min/1.73m2    GFR est non-AA 9 (L) >60 ml/min/1.73m2    Calcium 8.5 8.3 - 10.4 MG/DL   URINALYSIS W/ RFLX MICROSCOPIC    Collection Time: 03/27/18 10:45 AM   Result Value Ref Range    Color JUANI      Appearance CLOUDY      Specific gravity 1.015 1.001 - 1.023      pH (UA) 6.5 5.0 - 9.0      Protein 300 (A) NEG mg/dL    Glucose 250 mg/dL    Ketone TRACE (A) NEG mg/dL    Bilirubin SMALL (A) NEG      Blood LARGE (A) NEG      Urobilinogen 1.0 0.2 - 1.0 EU/dL    Nitrites NEGATIVE  NEG      Leukocyte Esterase TRACE (A) NEG      WBC 3-5 0 /hpf    RBC >100 0 /hpf    Epithelial cells 3-5 0 /hpf    Bacteria 0 0 /hpf Amorphous Crystals TRACE 0     SODIUM, UR, RANDOM    Collection Time: 03/27/18 10:46 AM   Result Value Ref Range    Sodium urine, random 80 MMOL/L   POTASSIUM, UR, RANDOM    Collection Time: 03/27/18 10:46 AM   Result Value Ref Range    Potassium urine, random 21 MMOL/L   CREATININE, UR, RANDOM    Collection Time: 03/27/18 10:46 AM   Result Value Ref Range    Creatinine, urine 83.70 mg/dL   CHLORIDE, UR, RANDOM    Collection Time: 03/27/18 10:46 AM   Result Value Ref Range    Chloride,urine random 65 MMOL/L   PROTEIN URINE, RANDOM    Collection Time: 03/27/18 10:46 AM   Result Value Ref Range    Protein, urine random 524 (H) <11.9 mg/dL           Assessment:     Principal Problem:    ARF (acute renal failure) (HCC) (3/26/2018)    Active Problems:    Heroin abuse (3/26/2018)      Tobacco abuse (3/26/2018)        Plan: AISSATOU with hx of volume depletion with nausea/vomitting. Could still be prerenal or have an ATN. The differential is wide. He could have a GN ( FSGS associated with the IVDA)  Acute GN doesn't present with anuria typically. He could have an AIN from a substance used to cut the drugs. The ultrasound with echogenic features suggests some chronicity     At this point he should continue with IVF. Check serology  And follow. I discussed dialysis for progressive AISSATOU.      Signed By: Paul Hawkins MD     March 27, 2018

## 2018-03-27 NOTE — PROGRESS NOTES
Patient ' HA has decreased, requests more tylenol to keep HA from increasing in severity. Hourly rounds maintained all shift.

## 2018-03-27 NOTE — MED STUDENT NOTES
Renal consult    Subjective:     Jacobo Forrester is a 26 y.o.  male with a hx of heroin + meth IVDU x few months who is being seen for AISSATOU. Last heroin use 2 days ago. Pt seen in ED yesterday for nausea, profuse vomiting and malaise which began 4 days ago but has progressively worsened. Patient states he has not been able to tolerate PO PTA. Associated symptoms include dizziness/light-headedness when standing. Denies fever, chills, chest pain, sob, abd pain, swelling, hematuria, dysuria. States frequent use of goody packs (ASA/NSAID)- x1 daily. Upon ED arrival, he reports no urine output for 2 days. In ED CK of 1419, BUN 67 and Cr 4.92. Denies hx of prior kidney disease. Family hx of lupus in mother. Denies hx of HIV, last negative test was 1 year ago. He states he always uses clean needles. Reports nausea/vomiting, malaise are improved today. Patient c/o bilateral flank pain. History reviewed. No pertinent past medical history. Past Surgical History:   Procedure Laterality Date    HX TONSIL AND ADENOIDECTOMY       History reviewed. No pertinent family history.    Social History   Substance Use Topics    Smoking status: Current Every Day Smoker     Packs/day: 0.25    Smokeless tobacco: Not on file    Alcohol use No       Current Facility-Administered Medications   Medication Dose Route Frequency Provider Last Rate Last Dose    dextrose 5 % - 0.45% NaCl infusion  150 mL/hr IntraVENous CONTINUOUS Elizabeth Shelley  mL/hr at 03/27/18 0900 150 mL/hr at 03/27/18 0900    sodium chloride (NS) flush 5-10 mL  5-10 mL IntraVENous Q8H Malick Sandoval MD   10 mL at 03/27/18 0509    sodium chloride (NS) flush 5-10 mL  5-10 mL IntraVENous PRN Malick Sandoval MD        acetaminophen (TYLENOL) tablet 650 mg  650 mg Oral Q4H PRN Malick Sandoval MD   650 mg at 03/27/18 0507    ondansetron (ZOFRAN) injection 4 mg  4 mg IntraVENous Q4H PRN Malick Sandoval MD        heparin (porcine) injection 5,000 Units  5,000 Units SubCUTAneous Q8H Grace Myers MD   5,000 Units at 03/27/18 0602        Allergies   Allergen Reactions    Pcn [Penicillins] Anaphylaxis        Review of Systems:  A comprehensive review of systems was negative except for that written in the History of Present Illness. Objective: Intake and Output:    03/27 0701 - 03/27 1900  In: 240 [P.O.:240]  Out: -        Physical Exam:   General appearance: alert, cooperative, no distress, appears stated age  Head: Normocephalic, without obvious abnormality, atraumatic  Eyes: conjunctivae clear. Lungs: clear to auscultation bilaterally  Heart: regular rate and rhythm, no murmur, click, rub or gallop  Abdomen: soft, non-tender.  Bowel sounds normal. No masses,  no organomegaly  : vivar in place with ~110 ml orange-red urine in bag  Back: No CVAT  Extremities: extremities normal, atraumatic, no cyanosis or edema  Skin: Skin color, texture, turgor normal. No rashes or lesions      Data Review:   Recent Results (from the past 24 hour(s))   METABOLIC PANEL, BASIC    Collection Time: 03/27/18  5:40 AM   Result Value Ref Range    Sodium 137 136 - 145 mmol/L    Potassium 4.1 3.5 - 5.1 mmol/L    Chloride 100 98 - 107 mmol/L    CO2 23 21 - 32 mmol/L    Anion gap 14 7 - 16 mmol/L    Glucose 102 (H) 65 - 100 mg/dL    BUN 85 (H) 6 - 23 MG/DL    Creatinine 7.72 (H) 0.8 - 1.5 MG/DL    GFR est AA 10 (L) >60 ml/min/1.73m2    GFR est non-AA 9 (L) >60 ml/min/1.73m2    Calcium 8.5 8.3 - 10.4 MG/DL   URINALYSIS W/ RFLX MICROSCOPIC    Collection Time: 03/27/18 10:45 AM   Result Value Ref Range    Color JUANI      Appearance CLOUDY      Specific gravity 1.015 1.001 - 1.023      pH (UA) 6.5 5.0 - 9.0      Protein 300 (A) NEG mg/dL    Glucose 250 mg/dL    Ketone TRACE (A) NEG mg/dL    Bilirubin SMALL (A) NEG      Blood LARGE (A) NEG      Urobilinogen 1.0 0.2 - 1.0 EU/dL    Nitrites NEGATIVE  NEG      Leukocyte Esterase TRACE (A) NEG      WBC 3-5 0 /hpf    RBC >100 0 /hpf    Epithelial cells 3-5 0 /hpf    Bacteria 0 0 /hpf    Amorphous Crystals TRACE 0     SODIUM, UR, RANDOM    Collection Time: 03/27/18 10:46 AM   Result Value Ref Range    Sodium urine, random 80 MMOL/L   POTASSIUM, UR, RANDOM    Collection Time: 03/27/18 10:46 AM   Result Value Ref Range    Potassium urine, random 21 MMOL/L   CREATININE, UR, RANDOM    Collection Time: 03/27/18 10:46 AM   Result Value Ref Range    Creatinine, urine 83.70 mg/dL   CHLORIDE, UR, RANDOM    Collection Time: 03/27/18 10:46 AM   Result Value Ref Range    Chloride,urine random 65 MMOL/L   PROTEIN URINE, RANDOM    Collection Time: 03/27/18 10:46 AM   Result Value Ref Range    Protein, urine random 524 (H) <11.9 mg/dL         Assessment:     Principal Problem:    ARF (acute renal failure) (HCC) (3/26/2018)    Active Problems:    Heroin abuse (3/26/2018)      Tobacco abuse (3/26/2018)    Meth abuse    Plan:     Kidney function is worse today - BUN 85 and Cr 7.72. Yesterday - BUN 67 and Cr 4.92. Oliguric - He reports no urine output prior to today for 2days. 240 ml urine output today since this am.   US shows increased renal echogenicity but no evidence of post-renal obstruction. Urine studies show proteinuria, glucosuria (but serum glucose 102), rbc which leans towards intra-renal pathology. Urine sodium 80 which is inconsistent with prerenal. Trace Ketones. Could still be pre-renal, but suspect intra-renal.   Will order FREDRICK and hepatitis panel. Continue fluids and monitor labs. Discussed possibility of dialysis if kidney function does not return or worsens despite medical management. Signed By: [place]     March 27, 2018         *ATTENTION:  This note has been created by a medical student for educational purposes only. Please do not refer to the content of this note for clinical decision-making, billing, or other purposes.   Please see attending physicians note to obtain clinical information on this patient. *

## 2018-03-27 NOTE — INTERDISCIPLINARY ROUNDS
Interdisciplinary team rounds were held 3/27/2018 with the following team members:Care Management, Nursing, Physical Therapy and Physician. Plan of care discussed. See clinical pathway and/or care plan for interventions and desired outcomes.

## 2018-03-27 NOTE — PROGRESS NOTES
Progress Note    Patient: Leti Alan MRN: [id number]  SSN: [id number]    YOB: 1985  Age: 26 y.o. Sex: male      Admit Date: 3/26/2018    LOS: 1 day     Subjective:   Patient examined at bedside. He complained of lower back pain, 5/10 in intensity. Has been there for 4 days now. Denies any focal neurological deficits, nor bowel nor urinary incontinence. Appreciate renal evaluation today in light of worsening kidney function. ROS: all pertinent findings described in my note. Objective:     Vitals:    03/26/18 1947 03/27/18 0030 03/27/18 0514 03/27/18 0700   BP: 133/82 (!) 131/91 111/79 110/72   Pulse: 97 72 64 68   Resp: 17 17 17 18   Temp: 98.4 °F (36.9 °C) 98.4 °F (36.9 °C) 98.6 °F (37 °C)    SpO2: 95% 95% 95% 96%        Intake and Output:  Current Shift:    Last three shifts:      Physical Exam:   GENERAL: alert, cooperative, no distress, appears stated age  EYE: negative  LYMPHATIC: Cervical, supraclavicular, and axillary nodes normal.   THROAT & NECK: normal and no erythema or exudates noted. LUNG: clear to auscultation bilaterally  HEART: regular rate and rhythm, S1, S2 normal, no murmur, click, rub or gallop  ABDOMEN: soft, non-tender. Bowel sounds normal. No masses,  no organomegaly  EXTREMITIES:  extremities normal, atraumatic, no cyanosis or edema  SKIN: Normal.  NEUROLOGIC: negative  PSYCHIATRIC: non focal    Lab/Data Review: All lab results for the last 24 hours reviewed.      Assessment:     Principal Problem:    ARF (acute renal failure) ([facility] [address] (3/26/2018)    Active Problems:    Heroin abuse (3/26/2018)      Tobacco abuse (3/26/2018)      Plan:     · Change IVF to D5/Half NS  · Place vivar cath and monitor strict I/O  · If he does not improve, or has anuria may need HD  · Avoid nephrotoxic meds  · Monitor electrolytes daily  · He does have elevated LFTS, possible hepatitis related to drug use / HIV/ hepatitis virus- will check hepatitis panel   · Rhabdomyolysis: monitor CK- continue IVF   · Renal US did not show any post-renal cause  · Renal F/U   · SW for drug rehab options  · DVT ppx: heparin, GCS      Code Status: full       Signed By: Geni Whalen MD     March 27, 2018

## 2018-03-27 NOTE — PROGRESS NOTES
Pt resting quietly in bed. No distress noted. Lungs sounds clear bilaterally with respirations even and unlabored. Bowel sounds active in all quadrants. +2 pulses bilaterally in upper and lower extremities. Instructed to call for assistance. IV infusing with no signs of infiltration or phlebitis. Choudhury in place draining lida urine. Call light in reach.

## 2018-03-27 NOTE — MED STUDENT NOTES
Internal Medicine Progress Note  Admission: 3/26/2018  Encounter Date: 03/27/18      Subjective:  Brandin Escudero is a 26 y.o. male with a hx of IV drug abuse who was admitted on 3/26/18 for ARF with Scr of 4.92. He had a 2 day hx of malaise associated with nausea and he stated that the symptoms began after injecting heroin. Patient said he had not made urine in 2 days. 3/27: Today he says he does not feel well still. He is complaining of back pain that has been present for 4 days. He rates that pain at 5 out of 10 in severity and points across his lower back stating that is hurts on both sides of his spine. He has not had any objective fevers, chills, incontinence, or numbness and tingling in his legs. He says he has used heroin and meth. Complete ROS done and is as stated in HPI or otherwise negative. Objective:  Visit Vitals    /86    Pulse 65    Temp 97.8 °F (36.6 °C)    Resp 18    SpO2 97%       Intake/Output Summary (Last 24 hours) at 03/27/18 1336  Last data filed at 03/27/18 7075   Gross per 24 hour   Intake              240 ml   Output                0 ml   Net              240 ml       Physical Exam  --General: Appears fatigued, in no acute distress   --HENT: nares clear without discharge, oropharynx clear and moist, no JVD  --Eyes: no scleral icterus  --Heart: Normal S1, S2, RRR, no murmurs rubs or gallops  --Lungs: Normal work of breathing, CTAB  --Abdomen: Normal BS, non-tender, non-distended  --Extremities: No cyanosis or edema, moves all 4 spontaneously. 5/5 strength in both hips and legs. No asteriskis present. --Skin: Warm, dry. No rashes or trauma    Imaging:  FINDINGS:  The kidneys have increased echogenicity with no mass, stone or hydronephrosis. The right kidney measures 11.1 cm and the left kidney measures 12.2 cm in  length.     The aorta tapers normally.   The proximal iliac arteries are normal.  The IVC is  normal. No retroperitoneal mass is identified.     The urinary bladder is normal.     IMPRESSION  IMPRESSION: Increased renal echogenicity. Findings compatible with chronic  medical renal disease. Labs: Creatinine has increased from 4.92 to 7.72        Current Facility-Administered Medications:     dextrose 5 % - 0.45% NaCl infusion, 150 mL/hr, IntraVENous, CONTINUOUS, Norm Marrero MD, Last Rate: 150 mL/hr at 03/27/18 0900, 150 mL/hr at 03/27/18 0900    sodium chloride (NS) flush 5-10 mL, 5-10 mL, IntraVENous, Q8H, Radha Gaspar MD, 10 mL at 03/27/18 0509    sodium chloride (NS) flush 5-10 mL, 5-10 mL, IntraVENous, PRN, Radha Gaspar MD    acetaminophen (TYLENOL) tablet 650 mg, 650 mg, Oral, Q4H PRN, Radha Gaspar MD, 650 mg at 03/27/18 0507    ondansetron [facility]) injection 4 mg, 4 mg, IntraVENous, Q4H PRN, Radha Gaspar MD    heparin (porcine) injection 5,000 Units, 5,000 Units, SubCUTAneous, Q8H, Radha Gaspar MD, 5,000 Units at 03/27/18 0602        Assessment:  Patient Active Problem List   Diagnosis Code    ARF (acute renal failure) ([facility] [address] N17.9    Heroin abuse F11.10    Tobacco abuse Z72.0         Plan:     ARF: trend Scr. Scr has increased despite IVF. Electrolytes are stable. US shows chronic renal disease. Consult Nephrology. Intake/output: will insert vivar to measure strict ins and outs. Back pain: observe any changes from current back pain. Be aware of potential for epidural abscess because of patients IV drug use. Drug abuse: discuss drug rehab options with patient        *ATTENTION:  This note has been created by a medical student for educational purposes only. Please do not refer to the content of this note for clinical decision-making, billing, or other purposes. Please see attending physicians note to obtain clinical information on this patient. *

## 2018-03-27 NOTE — PROGRESS NOTES
CM followed up with patient. Patient stated he originally is from [facility] and currently has 2 children who live there ages 15 and 8 who he speaks with at times. 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 Patient stated he had a job and if CM could call Jemima Cruz who was his boss at [phone]. Call placed to Jemima Cruz and notified him that patient has been in the hospital and he was appreciative of the information. Patient stated he was staying at a friend's house prior to admission but does not want to go back there due to drug use and his friend would not drive him to the hospital he dropped him off 3 miles away. All patient's belongings are currently at friend's house, patient states he can return there to get his stuff. Patient's education is high school graduate. Patient stated that he was once in a program called [address] [place] [address] but failed a drug test and they told him he could not return until 30 days. [address], [phone], and spoke with Clari Morrell and she stated that patient owes a balance of 38.61 which would need to be paid before returning. Clari Parents also stated that patient was just discharged from the facility in [place] on 3/20/18 for drug use and cannot return to facility until 4/20/18. Patient states he has no support here. Has a sister who lives in [place] but cannot contact her for any assistance. Patient also states he has no other family or friends who can help him at this time. Discussed with patient options of shelters, and he stated he would rather not at this time, however he realizes he may have too. CM will continue to follow for discharge needs.

## 2018-03-27 NOTE — PROGRESS NOTES
Pt rounded on hourly during shift. Pt alert and and oriented. Choudhury placed during shift per MD order. Pt tolerated well. Now draining lida urine. Pt c/o back pain during shift and asked for heating pad. No heating pads available so warm clothes applied. All needs met at this time.

## 2018-03-27 NOTE — PROGRESS NOTES
Call placed to [facility] to see if patient could possible go there at discharge, since at this time patient has no place to go. Counselor stated patient could go to facility and he was checking with staff to see if they could hold a bed for the patient at this time. If they cannot hold the bed, patient will have to be at facility at 5:30am to get on the list at the facility. Awaiting call back. CM continues to follow.

## 2018-03-28 LAB
ANION GAP SERPL CALC-SCNC: 13 MMOL/L (ref 7–16)
BUN SERPL-MCNC: 96 MG/DL (ref 6–23)
CALCIUM SERPL-MCNC: 8.3 MG/DL (ref 8.3–10.4)
CHLORIDE SERPL-SCNC: 101 MMOL/L (ref 98–107)
CK SERPL-CCNC: 145 U/L (ref 21–215)
CO2 SERPL-SCNC: 22 MMOL/L (ref 21–32)
CREAT SERPL-MCNC: 10.6 MG/DL (ref 0.8–1.5)
GLUCOSE SERPL-MCNC: 111 MG/DL (ref 65–100)
MAGNESIUM SERPL-MCNC: 2.6 MG/DL (ref 1.8–2.4)
POTASSIUM SERPL-SCNC: 4 MMOL/L (ref 3.5–5.1)
SODIUM SERPL-SCNC: 136 MMOL/L (ref 136–145)

## 2018-03-28 PROCEDURE — 82550 ASSAY OF CK (CPK): CPT | Performed by: INTERNAL MEDICINE

## 2018-03-28 PROCEDURE — 74011000250 HC RX REV CODE- 250: Performed by: INTERNAL MEDICINE

## 2018-03-28 PROCEDURE — 65270000029 HC RM PRIVATE

## 2018-03-28 PROCEDURE — 74011000258 HC RX REV CODE- 258: Performed by: INTERNAL MEDICINE

## 2018-03-28 PROCEDURE — 74011250637 HC RX REV CODE- 250/637: Performed by: INTERNAL MEDICINE

## 2018-03-28 PROCEDURE — 83520 IMMUNOASSAY QUANT NOS NONAB: CPT | Performed by: INTERNAL MEDICINE

## 2018-03-28 PROCEDURE — 83516 IMMUNOASSAY NONANTIBODY: CPT | Performed by: INTERNAL MEDICINE

## 2018-03-28 PROCEDURE — 86160 COMPLEMENT ANTIGEN: CPT | Performed by: INTERNAL MEDICINE

## 2018-03-28 PROCEDURE — 80048 BASIC METABOLIC PNL TOTAL CA: CPT | Performed by: INTERNAL MEDICINE

## 2018-03-28 PROCEDURE — 36415 COLL VENOUS BLD VENIPUNCTURE: CPT | Performed by: INTERNAL MEDICINE

## 2018-03-28 PROCEDURE — 74011250636 HC RX REV CODE- 250/636: Performed by: INTERNAL MEDICINE

## 2018-03-28 PROCEDURE — 83735 ASSAY OF MAGNESIUM: CPT | Performed by: INTERNAL MEDICINE

## 2018-03-28 PROCEDURE — 86038 ANTINUCLEAR ANTIBODIES: CPT | Performed by: INTERNAL MEDICINE

## 2018-03-28 PROCEDURE — 80074 ACUTE HEPATITIS PANEL: CPT | Performed by: INTERNAL MEDICINE

## 2018-03-28 RX ORDER — MAG HYDROX/ALUMINUM HYD/SIMETH 200-200-20
30 SUSPENSION, ORAL (FINAL DOSE FORM) ORAL
Status: DISCONTINUED | OUTPATIENT
Start: 2018-03-28 | End: 2018-03-31

## 2018-03-28 RX ADMIN — FAMOTIDINE 20 MG: 10 INJECTION, SOLUTION INTRAVENOUS at 17:32

## 2018-03-28 RX ADMIN — DEXTROSE MONOHYDRATE AND SODIUM CHLORIDE 150 ML/HR: 5; .45 INJECTION, SOLUTION INTRAVENOUS at 07:58

## 2018-03-28 RX ADMIN — Medication 10 ML: at 13:42

## 2018-03-28 RX ADMIN — Medication 10 ML: at 22:37

## 2018-03-28 RX ADMIN — HEPARIN SODIUM 5000 UNITS: 5000 INJECTION, SOLUTION INTRAVENOUS; SUBCUTANEOUS at 06:15

## 2018-03-28 RX ADMIN — DEXTROSE MONOHYDRATE AND SODIUM CHLORIDE 150 ML/HR: 5; .45 INJECTION, SOLUTION INTRAVENOUS at 17:37

## 2018-03-28 RX ADMIN — Medication 10 ML: at 06:16

## 2018-03-28 RX ADMIN — ALUMINUM HYDROXIDE, MAGNESIUM HYDROXIDE, AND SIMETHICONE 30 ML: 200; 200; 20 SUSPENSION ORAL at 22:38

## 2018-03-28 RX ADMIN — HEPARIN SODIUM 5000 UNITS: 5000 INJECTION, SOLUTION INTRAVENOUS; SUBCUTANEOUS at 13:42

## 2018-03-28 NOTE — PROGRESS NOTES
Progress Note    Patient: Omar Pro MRN: [id number]  SSN: [id number]    YOB: 1985  Age: 26 y.o. Sex: male      Admit Date: 3/26/2018    LOS: 2 days     Subjective:   Patient examined at bedside. He had abdominal pain, dull like today. Had 1 episode of vomiting last night, also decreased appetite. I explained latest kidney function results and possibility of HD if no improvement. ROS: all pertinent findings described in my note. Objective:     Vitals:    03/28/18 0011 03/28/18 0454 03/28/18 0730 03/28/18 0738   BP: 138/85 144/86 147/88    Pulse: 64 (!) 57 60    Resp: 18 18 18    Temp: 98.6 °F (37 °C) 98 °F (36.7 °C) 98 °F (36.7 °C)    SpO2: 97% 96% 98%    Weight:    67.2 kg (148 lb 3.2 oz)        Intake and Output:  Current Shift:    Last three shifts: 03/26 1901 - 03/28 0700  In: 3001 [P.O.:480; I.V.:2521]  Out: 250 [Urine:250]    Physical Exam:   GENERAL: alert, cooperative, no distress, appears stated age  EYE: negative  LYMPHATIC: Cervical, supraclavicular, and axillary nodes normal.   THROAT & NECK: normal and no erythema or exudates noted. LUNG: clear to auscultation bilaterally  HEART: regular rate and rhythm, S1, S2 normal, no murmur, click, rub or gallop  ABDOMEN: soft, but mild tenderness over epigastrium. Bowel sounds normal. No masses,  no organomegaly  EXTREMITIES:  extremities normal, atraumatic, no cyanosis or edema  SKIN: Normal.  NEUROLOGIC: negative- no asterixis   PSYCHIATRIC: non focal    Lab/Data Review: All lab results for the last 24 hours reviewed.      Assessment:     Principal Problem:    ARF (acute renal failure) ([facility] [address] (3/26/2018)    Active Problems:    Heroin abuse (3/26/2018)      Tobacco abuse (3/26/2018)      Hepatitis (3/27/2018)      Rhabdomyolysis (3/27/2018)      Plan:     · continue D5/Half NS  · monitor strict I/O  · Avoid nephrotoxic meds  · Monitor electrolytes daily  · He does have elevated LFTS, possible hepatitis related to drug use / HIV/ hepatitis virus/ ischemic hepatitis- f/u hepatitis panel   · Rhabdomyolysis: resolved   · Renal F/U   · SW for drug rehab options  · DVT ppx: heparin, GCS      Code Status: full       Signed By: Isamar Cueva MD     March 28, 2018

## 2018-03-28 NOTE — MED STUDENT NOTES
Progress Note    Patient: Jacobo oFrrester MRN: [id number]  SSN: [id number]    YOB: 1985  Age: 26 y.o. Sex: male      Admit Date: 3/26/2018    LOS: 2 days     Subjective:     Patient c/o same bilateral flank pain. Now reports some abdominal bloating. Last BM was maybe 5 days ago. Denies nausea or vomiting today but states x1 emesis last night. No appetite this morning secondary to bloating sensation. Continues to make poor urine output. Objective:     Vitals:    03/28/18 0454 03/28/18 0730 03/28/18 0738 03/28/18 1114   BP: 144/86 147/88  142/89   Pulse: (!) 57 60  (!) 51   Resp: 18 18 18   Temp: 98 °F (36.7 °C) 98 °F (36.7 °C)  97.9 °F (36.6 °C)   SpO2: 96% 98%  96%   Weight:   67.2 kg (148 lb 3.2 oz)         Intake and Output:  Current Shift: 03/28 0701 - 03/28 1900  In: 240 [P.O.:240]  Out: -   Last three shifts: 03/26 1901 - 03/28 0700  In: 3001 [P.O.:480; I.V.:2521]  Out: 250 [Urine:250]    Physical Exam:   General appearance: alert, cooperative, no distress, appears stated age  Head: Normocephalic, without obvious abnormality, atraumatic  Eyes: conjunctivae clear. Lungs: clear to auscultation bilaterally  Heart: regular rate and rhythm, no murmur, click, rub or gallop  Abdomen: soft, mild epigastric tenderness. Bowel sounds normal. No masses,  no organomegaly  : vivar in place with ~110 ml orange-red urine in bag  Extremities: extremities normal, atraumatic, no cyanosis or edema  Skin: Skin color, texture, turgor normal. No rashes or lesions    Lab/Data Review: All lab results for the last 24 hours reviewed. Assessment:     Principal Problem:    ARF (acute renal failure) ([address] (3/26/2018)    Active Problems:    Heroin abuse (3/26/2018)      Tobacco abuse (3/26/2018)      Hepatitis (3/27/2018)      Rhabdomyolysis (3/27/2018)        Plan:     Kidney function continues to decline despite fluids.     BUN 96 and Cr 10.6 today and is still oliguric with 240 urine output. Aminotransferases significantly elevated - AST 1265, ALT 4213 which is concerning for possible shock liver. FREDRICK, ANCA, complement, GBM Ab, hepatitis panel still pending. Suspect ATN vs glomerular dz. Consider stool softener/laxative for constipation. Continue to monitor labs. Previously discussed and pt aware that dialysis will be needed if kidney function continues to decline despite interventions. Signed By: Bruce Vaughn     March 28, 2018          *ATTENTION:  This note has been created by a medical student for educational purposes only. Please do not refer to the content of this note for clinical decision-making, billing, or other purposes. Please see attending physicians note to obtain clinical information on this patient. *

## 2018-03-28 NOTE — PROGRESS NOTES
Renal Progress Note    Admission Date: 3/26/2018   Subjective:      Sleepy, arousable     Objective:     Physical Exam:    Patient Vitals for the past 8 hrs:   BP Temp Pulse Resp SpO2 Weight   03/28/18 0738 - - - - - 67.2 kg (148 lb 3.2 oz)   03/28/18 0730 147/88 98 °F (36.7 °C) 60 18 98 % -   03/28/18 0454 144/86 98 °F (36.7 °C) (!) 57 18 96 % -      Gen: comfortable , NAD  HEENT: dry membranes  CV: S1, S2  Lungs: Clear bilaterally  Extem: no edema       Current Facility-Administered Medications   Medication Dose Route Frequency    dextrose 5 % - 0.45% NaCl infusion  150 mL/hr IntraVENous CONTINUOUS    sodium chloride (NS) flush 5-10 mL  5-10 mL IntraVENous Q8H    sodium chloride (NS) flush 5-10 mL  5-10 mL IntraVENous PRN    acetaminophen (TYLENOL) tablet 650 mg  650 mg Oral Q4H PRN    ondansetron (ZOFRAN) injection 4 mg  4 mg IntraVENous Q4H PRN    heparin (porcine) injection 5,000 Units  5,000 Units SubCUTAneous Q8H            Data Review:     LABS:   Recent Results (from the past 12 hour(s))   METABOLIC PANEL, BASIC    Collection Time: 03/28/18  6:21 AM   Result Value Ref Range    Sodium 136 136 - 145 mmol/L    Potassium 4.0 3.5 - 5.1 mmol/L    Chloride 101 98 - 107 mmol/L    CO2 22 21 - 32 mmol/L    Anion gap 13 7 - 16 mmol/L    Glucose 111 (H) 65 - 100 mg/dL    BUN 96 (H) 6 - 23 MG/DL    Creatinine 10.60 (H) 0.8 - 1.5 MG/DL    GFR est AA 7 (L) >60 ml/min/1.73m2    GFR est non-AA 6 (L) >60 ml/min/1.73m2    Calcium 8.3 8.3 - 10.4 MG/DL   MAGNESIUM    Collection Time: 03/28/18  6:21 AM   Result Value Ref Range    Magnesium 2.6 (H) 1.8 - 2.4 mg/dL   CK    Collection Time: 03/28/18  6:21 AM   Result Value Ref Range     21 - 215 U/L         Plan:     Principal Problem:    ARF (acute renal failure) ([facility]) (3/26/2018)    Active Problems:    Heroin abuse (3/26/2018)      Tobacco abuse (3/26/2018)      Hepatitis (3/27/2018)      Rhabdomyolysis (3/27/2018)    AISSATOU with volume depletion/ ATN  Consistent with shock liver enzymes. Still with some proteinuria and heroin use, so looking for a GN, but oliguria fits with ATN    Volume and electrolytes are stable today. If not improvement will plan on a dialysis catheter in AM and initiation. Serology pending.  Could also consider kidney biopsy

## 2018-03-28 NOTE — PROGRESS NOTES
End of shift note. Pt very withdrawn and non interactive this shift. Slept most of the day. Stated \"I may have to do dialysis the rest of my life\". C/O pain in abdomen.

## 2018-03-28 NOTE — INTERDISCIPLINARY ROUNDS
Interdisciplinary team rounds were held 3/28/2018 with the following team members:Care Management, Nursing, Physical Therapy and Physician. Plan of care discussed. See clinical pathway and/or care plan for interventions and desired outcomes.

## 2018-03-28 NOTE — MED STUDENT NOTES
Internal Medicine Progress Note  Admission: 3/26/2018  Encounter Date: 03/28/18      Subjective:  Jacobo Forrester is a 26 y.o. male with a hx of IV drug abuse who was admitted on 3/26/18 for ARF with Scr of 4.92. He had a 2 day hx of malaise associated with nausea and he stated that the symptoms began after injecting heroin. Patient said he had not made urine in 2 days. 3/28: he states that he \"had a rough night. \" He says he did not eat last night because he does not have an appetite. He has some abdominal pain. His back pain has improved and now only complains of his abdominal pain and points to his epigastric region. He says he vomited once last night. He does not have any chest pain or shortness of breath. He has not had a bowel movement since before being admitted at the hospital. He says that he never shared needles but has used used needles of his own a few times before. Complete ROS done and is stated in HPI or otherwise negative. Objective:  Visit Vitals    /89    Pulse (!) 51    Temp 97.9 °F (36.6 °C)    Resp 18    Wt 148 lb 3.2 oz (67.2 kg)    SpO2 96%    BMI 23.21 kg/m2       Intake/Output Summary (Last 24 hours) at 03/28/18 1127  Last data filed at 03/28/18 5374   Gross per 24 hour   Intake             3001 ml   Output              250 ml   Net             2751 ml       Physical Exam  --General: appears tired and in some discomfort, in no acute distress   --HENT: nares clear without discharge, oropharynx clear and moist, no JVD  --Eyes: no scleral icterus  --Heart: Normal S1, S2, RRR, no murmurs rubs or gallops  --Lungs: Normal work of breathing, CTAB  --Abdomen: Normoactive BS, soft, tender, non-distended  --Extremities: No cyanosis or edema, moves all 4 spontaneously, peripheral pulses intact  --Skin: Warm, dry. No rashes or trauma    Labs: BUN 96, Scr 10.6 have increased since yesterday. Transaminases measured yesterday and were very elevated. ALT 4213, AST 1265.  CK has improved to 145.      Current Facility-Administered Medications:     dextrose 5 % - 0.45% NaCl infusion, 150 mL/hr, IntraVENous, CONTINUOUS, Jacquelyn Matthews MD, Last Rate: 150 mL/hr at 03/28/18 0758, 150 mL/hr at 03/28/18 0758    sodium chloride (NS) flush 5-10 mL, 5-10 mL, IntraVENous, Q8H, Armand Orozco MD, 10 mL at 03/28/18 0616    sodium chloride (NS) flush 5-10 mL, 5-10 mL, IntraVENous, PRN, Armand Orozco MD    acetaminophen (TYLENOL) tablet 650 mg, 650 mg, Oral, Q4H PRN, Armand Orozco MD, 650 mg at 03/27/18 0507    [facility]ron [facility]) injection 4 mg, 4 mg, IntraVENous, Q4H PRN, Armand Orozco MD    heparin (porcine) injection 5,000 Units, 5,000 Units, SubCUTAneous, Q8H, Armand Orozco MD, 5,000 Units at 03/28/18 0615        Assessment:  Patient Active Problem List   Diagnosis Code    ARF (acute renal failure) ([facility] [address] N17.9    Heroin abuse F11.10    Tobacco abuse Z72.0    Hepatitis K75.9    Rhabdomyolysis M62.82     Patient is starting to show signs of uremia with nausea, vomiting, no appetite. His kidney function continues to decline. Patient is oliguric with 250 mL produced by vivar yesterday. His liver enzymes are very elevated which could possibly be caused by complications of IV drug use. His signs of rhabdomyolysis has improved. Plan:  ARF - continue IVF. Monitor CMP. Consider dialysis if conditions do not improve in the next couple of days. Monitor electrolytes  Inc LFT: Liver panel, serology for HIV or HBV/HCV, follow LFTs. Order liver US  Back pain: improved  Rhabdomyolysis: improved  Drug abuse:  pt about dangers of drug abuse and consider placement for rehab  Nephro: follow up        *ATTENTION:  This note has been created by a medical student for educational purposes only. Please do not refer to the content of this note for clinical decision-making, billing, or other purposes.   Please see attending physicians note to obtain clinical information on this patient. *

## 2018-03-28 NOTE — PROGRESS NOTES
Spoke with Momo Howell at [facility] and he stated that patient can come to their facility at discharge. No bed being held at this time, however CM will call when patient is about 2-3 days from discharge to try and get bed held.

## 2018-03-29 ENCOUNTER — APPOINTMENT (OUTPATIENT)
Dept: INTERVENTIONAL RADIOLOGY/VASCULAR | Age: 33
DRG: 674 | End: 2018-03-29
Attending: INTERNAL MEDICINE
Payer: SELF-PAY

## 2018-03-29 PROBLEM — B18.2 CHRONIC HEPATITIS C WITHOUT HEPATIC COMA (HCC): Status: ACTIVE | Noted: 2018-03-29

## 2018-03-29 LAB
ALBUMIN SERPL-MCNC: 2.9 G/DL (ref 3.5–5)
ALBUMIN/GLOB SERPL: 1 {RATIO} (ref 1.2–3.5)
ALP SERPL-CCNC: 60 U/L (ref 50–136)
ALT SERPL-CCNC: 1674 U/L (ref 12–65)
ANA SER QL: NEGATIVE
ANION GAP SERPL CALC-SCNC: 15 MMOL/L (ref 7–16)
APTT PPP: 31.3 SEC (ref 23.2–35.3)
AST SERPL-CCNC: 91 U/L (ref 15–37)
BILIRUB DIRECT SERPL-MCNC: 0.3 MG/DL
BILIRUB SERPL-MCNC: 0.8 MG/DL (ref 0.2–1.1)
BUN SERPL-MCNC: 102 MG/DL (ref 6–23)
C-ANCA TITR SER IF: NORMAL TITER
C3 SERPL-MCNC: 53 MG/DL (ref 82–167)
C4 SERPL-MCNC: 11 MG/DL (ref 14–44)
CALCIUM SERPL-MCNC: 8.5 MG/DL (ref 8.3–10.4)
CHLORIDE SERPL-SCNC: 98 MMOL/L (ref 98–107)
CO2 SERPL-SCNC: 23 MMOL/L (ref 21–32)
CREAT SERPL-MCNC: 12.7 MG/DL (ref 0.8–1.5)
GBM IGG SER-ACNC: 4 UNITS (ref 0–20)
GLOBULIN SER CALC-MCNC: 2.9 G/DL (ref 2.3–3.5)
GLUCOSE SERPL-MCNC: 104 MG/DL (ref 65–100)
HAV IGM SERPL QL IA: NEGATIVE
HBV CORE IGM SERPL QL IA: NEGATIVE
HBV SURFACE AG SERPL QL IA: NEGATIVE
HCV AB S/CO SERPL IA: >11 S/CO RATIO (ref 0–0.9)
INR PPP: 1.1
MAGNESIUM SERPL-MCNC: 2.5 MG/DL (ref 1.8–2.4)
MYELOPEROXIDASE AB SER IA-ACNC: <9 U/ML (ref 0–9)
P-ANCA ATYPICAL TITR SER IF: NORMAL TITER
P-ANCA TITR SER IF: NORMAL TITER
POTASSIUM SERPL-SCNC: 4.1 MMOL/L (ref 3.5–5.1)
PROT SERPL-MCNC: 5.8 G/DL (ref 6.3–8.2)
PROTEINASE3 AB SER IA-ACNC: <3.5 U/ML (ref 0–3.5)
PROTHROMBIN TIME: 13.4 SEC (ref 11.5–14.5)
SEE BELOW:, 164879: NORMAL
SODIUM SERPL-SCNC: 136 MMOL/L (ref 136–145)

## 2018-03-29 PROCEDURE — 74011000250 HC RX REV CODE- 250: Performed by: RADIOLOGY

## 2018-03-29 PROCEDURE — 77030002916 HC SUT ETHLN J&J -A

## 2018-03-29 PROCEDURE — 99152 MOD SED SAME PHYS/QHP 5/>YRS: CPT

## 2018-03-29 PROCEDURE — B244ZZZ ULTRASONOGRAPHY OF RIGHT HEART: ICD-10-PCS | Performed by: RADIOLOGY

## 2018-03-29 PROCEDURE — 77030018719 HC DRSG PTCH ANTIMIC J&J -A

## 2018-03-29 PROCEDURE — 74011250636 HC RX REV CODE- 250/636: Performed by: RADIOLOGY

## 2018-03-29 PROCEDURE — 36415 COLL VENOUS BLD VENIPUNCTURE: CPT | Performed by: INTERNAL MEDICINE

## 2018-03-29 PROCEDURE — 85610 PROTHROMBIN TIME: CPT | Performed by: INTERNAL MEDICINE

## 2018-03-29 PROCEDURE — C1894 INTRO/SHEATH, NON-LASER: HCPCS

## 2018-03-29 PROCEDURE — 74011250636 HC RX REV CODE- 250/636: Performed by: INTERNAL MEDICINE

## 2018-03-29 PROCEDURE — 74011250637 HC RX REV CODE- 250/637: Performed by: INTERNAL MEDICINE

## 2018-03-29 PROCEDURE — 85730 THROMBOPLASTIN TIME PARTIAL: CPT | Performed by: INTERNAL MEDICINE

## 2018-03-29 PROCEDURE — 80076 HEPATIC FUNCTION PANEL: CPT | Performed by: INTERNAL MEDICINE

## 2018-03-29 PROCEDURE — 80048 BASIC METABOLIC PNL TOTAL CA: CPT | Performed by: INTERNAL MEDICINE

## 2018-03-29 PROCEDURE — 0JH63XZ INSERTION OF TUNNELED VASCULAR ACCESS DEVICE INTO CHEST SUBCUTANEOUS TISSUE AND FASCIA, PERCUTANEOUS APPROACH: ICD-10-PCS | Performed by: RADIOLOGY

## 2018-03-29 PROCEDURE — 74011250636 HC RX REV CODE- 250/636

## 2018-03-29 PROCEDURE — 83735 ASSAY OF MAGNESIUM: CPT | Performed by: INTERNAL MEDICINE

## 2018-03-29 PROCEDURE — 02H633Z INSERTION OF INFUSION DEVICE INTO RIGHT ATRIUM, PERCUTANEOUS APPROACH: ICD-10-PCS | Performed by: RADIOLOGY

## 2018-03-29 PROCEDURE — 65270000029 HC RM PRIVATE

## 2018-03-29 PROCEDURE — 99153 MOD SED SAME PHYS/QHP EA: CPT

## 2018-03-29 PROCEDURE — C1769 GUIDE WIRE: HCPCS

## 2018-03-29 PROCEDURE — 74011000250 HC RX REV CODE- 250: Performed by: INTERNAL MEDICINE

## 2018-03-29 PROCEDURE — C1750 CATH, HEMODIALYSIS,LONG-TERM: HCPCS

## 2018-03-29 PROCEDURE — 36558 INSERT TUNNELED CV CATH: CPT

## 2018-03-29 PROCEDURE — 77030010507 HC ADH SKN DERMBND J&J -B

## 2018-03-29 RX ORDER — HEPARIN SODIUM 200 [USP'U]/100ML
1000 INJECTION, SOLUTION INTRAVENOUS CONTINUOUS
Status: DISCONTINUED | OUTPATIENT
Start: 2018-03-29 | End: 2018-03-30

## 2018-03-29 RX ORDER — FENTANYL CITRATE 50 UG/ML
25-50 INJECTION, SOLUTION INTRAMUSCULAR; INTRAVENOUS
Status: DISCONTINUED | OUTPATIENT
Start: 2018-03-29 | End: 2018-03-30

## 2018-03-29 RX ORDER — SODIUM CHLORIDE 9 MG/ML
25 INJECTION, SOLUTION INTRAVENOUS ONCE
Status: COMPLETED | OUTPATIENT
Start: 2018-03-29 | End: 2018-03-29

## 2018-03-29 RX ORDER — HEPARIN SODIUM 1000 [USP'U]/ML
1000-8000 INJECTION, SOLUTION INTRAVENOUS; SUBCUTANEOUS ONCE
Status: COMPLETED | OUTPATIENT
Start: 2018-03-29 | End: 2018-03-29

## 2018-03-29 RX ORDER — MIDAZOLAM HYDROCHLORIDE 1 MG/ML
.5-2 INJECTION, SOLUTION INTRAMUSCULAR; INTRAVENOUS
Status: DISCONTINUED | OUTPATIENT
Start: 2018-03-29 | End: 2018-03-30

## 2018-03-29 RX ORDER — LIDOCAINE HYDROCHLORIDE 10 MG/ML
8 INJECTION, SOLUTION EPIDURAL; INFILTRATION; INTRACAUDAL; PERINEURAL ONCE
Status: COMPLETED | OUTPATIENT
Start: 2018-03-29 | End: 2018-03-29

## 2018-03-29 RX ORDER — DIPHENHYDRAMINE HYDROCHLORIDE 50 MG/ML
25-50 INJECTION, SOLUTION INTRAMUSCULAR; INTRAVENOUS ONCE
Status: COMPLETED | OUTPATIENT
Start: 2018-03-29 | End: 2018-03-29

## 2018-03-29 RX ORDER — LIDOCAINE HYDROCHLORIDE AND EPINEPHRINE 20; 10 MG/ML; UG/ML
10-20 INJECTION, SOLUTION INFILTRATION; PERINEURAL ONCE
Status: COMPLETED | OUTPATIENT
Start: 2018-03-29 | End: 2018-03-29

## 2018-03-29 RX ADMIN — MIDAZOLAM HYDROCHLORIDE 1 MG: 1 INJECTION, SOLUTION INTRAMUSCULAR; INTRAVENOUS at 15:25

## 2018-03-29 RX ADMIN — SODIUM CHLORIDE 25 ML/HR: 900 INJECTION, SOLUTION INTRAVENOUS at 15:20

## 2018-03-29 RX ADMIN — Medication 10 ML: at 23:52

## 2018-03-29 RX ADMIN — Medication 10 ML: at 13:34

## 2018-03-29 RX ADMIN — FENTANYL CITRATE 50 MCG: 50 INJECTION, SOLUTION INTRAMUSCULAR; INTRAVENOUS at 15:10

## 2018-03-29 RX ADMIN — LIDOCAINE HYDROCHLORIDE,EPINEPHRINE BITARTRATE 300 MG: 20; .01 INJECTION, SOLUTION INFILTRATION; PERINEURAL at 15:38

## 2018-03-29 RX ADMIN — SODIUM BICARBONATE 2 ML: 0.2 INJECTION, SOLUTION INTRAVENOUS at 15:36

## 2018-03-29 RX ADMIN — VANCOMYCIN HYDROCHLORIDE 1000 MG: 1 INJECTION, POWDER, LYOPHILIZED, FOR SOLUTION INTRAVENOUS at 15:20

## 2018-03-29 RX ADMIN — Medication 10 ML: at 05:14

## 2018-03-29 RX ADMIN — DIPHENHYDRAMINE HYDROCHLORIDE 50 MG: 50 INJECTION, SOLUTION INTRAMUSCULAR; INTRAVENOUS at 15:10

## 2018-03-29 RX ADMIN — FENTANYL CITRATE 50 MCG: 50 INJECTION, SOLUTION INTRAMUSCULAR; INTRAVENOUS at 15:25

## 2018-03-29 RX ADMIN — LIDOCAINE HYDROCHLORIDE 8 ML: 10 INJECTION, SOLUTION EPIDURAL; INFILTRATION; INTRACAUDAL; PERINEURAL at 15:36

## 2018-03-29 RX ADMIN — FAMOTIDINE 20 MG: 10 INJECTION, SOLUTION INTRAVENOUS at 08:57

## 2018-03-29 RX ADMIN — HEPARIN SODIUM 2000 UNITS: 200 INJECTION, SOLUTION INTRAVENOUS at 15:41

## 2018-03-29 RX ADMIN — HEPARIN SODIUM 3800 UNITS: 1000 INJECTION, SOLUTION INTRAVENOUS; SUBCUTANEOUS at 15:49

## 2018-03-29 RX ADMIN — MIDAZOLAM HYDROCHLORIDE 1 MG: 1 INJECTION, SOLUTION INTRAMUSCULAR; INTRAVENOUS at 15:10

## 2018-03-29 RX ADMIN — HEPARIN SODIUM 5000 UNITS: 5000 INJECTION, SOLUTION INTRAVENOUS; SUBCUTANEOUS at 21:52

## 2018-03-29 NOTE — INTERDISCIPLINARY ROUNDS
Interdisciplinary team rounds were held 3/29/2018 with the following team members:Care Management, Nursing, Physical Therapy and Physician. Plan of care discussed. See clinical pathway and/or care plan for interventions and desired outcomes.

## 2018-03-29 NOTE — PROGRESS NOTES
TRANSFER - OUT REPORT:    Verbal report given to [facility] RN(name) on Zhou Barrios  being transferred to IR recovery(unit) for routine progression of care       Report consisted of patients Situation, Background, Assessment and   Recommendations(SBAR). Information from the following report(s) SBAR, Procedure Summary and MAR was reviewed with the receiving nurse. Lines:   Peripheral IV 03/29/18 Left Forearm (Active)   Site Assessment Clean, dry, & intact 3/29/2018  1:56 PM   Phlebitis Assessment 0 3/29/2018  1:56 PM   Infiltration Assessment 0 3/29/2018  1:56 PM   Dressing Status Clean, dry, & intact 3/29/2018  1:56 PM   Dressing Type Tape;Transparent 3/29/2018  1:56 PM   Hub Color/Line Status Infusing 3/29/2018  1:56 PM        Opportunity for questions and clarification was provided.

## 2018-03-29 NOTE — PROGRESS NOTES
IR Nurse Pre-Procedure Checklist Part 2          Consent signed: Yes    H&P complete:  Yes    Antibiotics: Yes    Airway/Mallampati Done: Yes    Shaved: Yes    Pregnancy Form:No    Patient Position: Yes    MD Side: Yes     Biopsy Worksheet: No    Specimen Medium: No

## 2018-03-29 NOTE — PROGRESS NOTES
Pt back from IR having a HD tunneled cath placed on the right upper chest. No noted bleeding drainage swelling. Pt is A/O. VS are WNL. Pt now eating dinner tray. Will continue to monitor.

## 2018-03-29 NOTE — PROGRESS NOTES
TRANSFER - OUT REPORT:    Verbal report given to Corinna CREWS(name) on Omar Pro  being transferred to [facility](unit) for routine progression of care       Report consisted of patients Situation, Background, Assessment and   Recommendations(SBAR). Information from the following report(s) SBAR, Procedure Summary and MAR was reviewed with the receiving nurse. Lines:   Peripheral IV 03/29/18 Left Forearm (Active)   Site Assessment Clean, dry, & intact 3/29/2018  1:56 PM   Phlebitis Assessment 0 3/29/2018  1:56 PM   Infiltration Assessment 0 3/29/2018  1:56 PM   Dressing Status Clean, dry, & intact 3/29/2018  1:56 PM   Dressing Type Tape;Transparent 3/29/2018  1:56 PM   Hub Color/Line Status Infusing 3/29/2018  1:56 PM        Opportunity for questions and clarification was provided.

## 2018-03-29 NOTE — MED STUDENT NOTES
Progress Note    Patient: Asia Stevens MRN: [id number]  SSN: [id number]    YOB: 1985  Age: 26 y.o. Sex: male      Admit Date: 3/26/2018    LOS: 3 days     Subjective:     Patient is feeling better. Abdominal bloating resolved. Had one BM last night. No other complaints at this time. Objective:     Vitals:    03/29/18 0106 03/29/18 0457 03/29/18 0555 03/29/18 0756   BP: 153/77  130/78 136/76   Pulse: (!) 46  (!) 46 (!) 59   Resp: 18  18 15   Temp: 98.2 °F (36.8 °C)  98.3 °F (36.8 °C) 97.8 °F (36.6 °C)   SpO2: 95%  91% 94%   Weight:  70.3 kg (155 lb)          Intake and Output:  Current Shift:    Last three shifts: 03/27 1901 - 03/29 0700  In: 360 [P.O.:360]  Out: 300 [Urine:300]    Physical Exam:   General appearance: alert, cooperative, no distress, appears stated age  Head: Normocephalic, without obvious abnormality, atraumatic  Eyes: conjunctivae clear. Lungs: clear to auscultation bilaterally  Heart: regular rate and rhythm, no murmur, click, rub or gallop  Abdomen: soft, non-tender. Bowel sounds normal. No masses,  no organomegaly  : vivar in place with 100 ml orange-red urine in bag  Extremities: extremities normal, atraumatic, no cyanosis or edema  Skin: Skin color, texture, turgor normal. No rashes or lesions    Lab/Data Review: All lab results for the last 24 hours reviewed. Assessment:     Principal Problem:    ARF (acute renal failure) ([facility] [address] (3/26/2018)    Active Problems:    Heroin abuse (3/26/2018)      Tobacco abuse (3/26/2018)      Hepatitis (3/27/2018)      Plan:     Aminotransferases improved today. Patient kidney function continues to decline.  and Cr 12.7 today with an-oliguria. This is most likely a glomerular disease. Pt urine protein in nephrotic range  Labs today show low complement C3,C4 and positive hepatitis C ab. Hx is significant for multiple possible etiologies of glomerular disease.  Family hx of lupus, IV heroin use, hepatitis C exposure. Can consider membranous nephropathy, membranoproliferative glomerulonephritis, FSGS, lupus nephritis    Plan is for dialysis. Port placement scheduled for today. Continue IVF  Patient will need renal biopsy at some point once kidney function stabilizes. Signed By: Rigo Tidwell     March 29, 2018          *ATTENTION:  This note has been created by a medical student for educational purposes only. Please do not refer to the content of this note for clinical decision-making, billing, or other purposes. Please see attending physicians note to obtain clinical information on this patient. *

## 2018-03-29 NOTE — MED STUDENT NOTES
Internal Medicine Progress Note  Admission: 3/26/2018  Encounter Date: 03/29/18      Subjective:  Jacobo Forrester is a 26 y.o. male with a hx of IV drug abuse who was admitted on 3/26/18 for ARF with Scr of 4.92. He had a 2 day hx of malaise associated with nausea and he stated that the symptoms began after injecting heroin. Patient said he had not made urine in 2 days. 3/29: Patient took milk of magnesia yesterday and was able to have a bowel movement. He said it was difficult and was solid and brown. He continues with back pain that he says is mostly a sore feeling. He felt like sleeping on his abdomen made it feel better. His back pain was rated at 6 out of 10 in severity. He does not have any numbness or tingling in his legs. He still has abdominal pain but was able to eat a small amount of his dinner yesterday. Complete ROS done and is stated in HPI or otherwise negative. Objective:  Visit Vitals    /58 (BP 1 Location: Left arm, BP Patient Position: Head of bed elevated (Comment degrees))    Pulse (!) 53    Temp 98 °F (36.7 °C)    Resp 15    Wt 70.3 kg (155 lb)    SpO2 98%    BMI 24.28 kg/m2       Intake/Output Summary (Last 24 hours) at 03/29/18 1203  Last data filed at 03/28/18 1358   Gross per 24 hour   Intake              120 ml   Output              100 ml   Net               20 ml       Physical Exam  --General: Well appearing, in no acute distress, laying prone due to back pain   --HENT: nares clear without discharge, oropharynx clear and moist, no JVD  --Eyes: no scleral icterus  --Heart: Normal S1, S2, RRR, no murmurs rubs or gallops  --Lungs: Normal work of breathing, CTAB  --Abdomen: Normoactive BS, soft, non-tender, non-distended  --Back: no lesions present. Paraspinal muscles are boggy. Has tenderness to palpation in lower back mostly on the right side  --Extremities: No cyanosis or edema, moves all 4 spontaneously, peripheral pulses intact  --Skin: Warm, dry.  No rashes or trauma    Labs: , Scr 12.7, ALT 1624, AST 91, urine output in last 24 hours was 260 mL, complement C3: 53, complement C4: 11, HCV ab: 11.0      Current Facility-Administered Medications:     famotidine (PF) (PEPCID) 20 mg in sodium chloride 10 mL injection, 20 mg, IntraVENous, DAILY, Pilo Sahu MD, 20 mg at 03/29/18 0857    alum-mag hydroxide-simeth (MYLANTA) oral suspension 30 mL, 30 mL, Oral, QID PRN, Bindu Vargas MD, 30 mL at 03/28/18 2238    dextrose 5 % - 0.45% NaCl infusion, 150 mL/hr, IntraVENous, CONTINUOUS, Pilo Sahu MD, Last Rate: 150 mL/hr at 03/28/18 1737, 150 mL/hr at 03/28/18 1737    sodium chloride (NS) flush 5-10 mL, 5-10 mL, IntraVENous, Q8H, Ashlee Stewart MD, 10 mL at 03/29/18 0514    sodium chloride (NS) flush 5-10 mL, 5-10 mL, IntraVENous, PRN, Ashlee Stewart MD    acetaminophen (TYLENOL) tablet 650 mg, 650 mg, Oral, Q4H PRN, Ashlee Stewart MD, 650 mg at 03/27/18 0507    ondansetron [facility]) injection 4 mg, 4 mg, IntraVENous, Q4H PRN, Ashlee Stewart MD    heparin (porcine) injection 5,000 Units, 5,000 Units, SubCUTAneous, Q8H, Ashlee Stewart MD, Stopped at 03/28/18 2237        Assessment:  Patient Active Problem List   Diagnosis Code    ARF (acute renal failure) ([facility] [address] N17.9    Heroin abuse F11.10    Tobacco abuse Z72.0    Hepatitis K75.9    Rhabdomyolysis M62.82     Patient kidney function is worsening each day. Complement (low) and hepatitis lab tests returned with elevated HCV antibody. His mother had lupus and his low complement may indicate he has lupus as well. Plan:  ARF - continue IVF. Monitor CMP. Consider dialysis. Monitor electrolytes. Order renal biopsy. Inc LFT: positive HCV. Speak with patient and see if insurance and costs of antiviral regimens are affordable. Order liver US to determine level of cirrhosis. Consider which regimen to use.   Back pain: improved  Rhabdomyolysis: improved  Drug abuse:  pt about dangers of drug abuse and consider placement for rehab  Nephro: follow up      *ATTENTION:  This note has been created by a medical student for educational purposes only. Please do not refer to the content of this note for clinical decision-making, billing, or other purposes. Please see attending physicians note to obtain clinical information on this patient. *

## 2018-03-29 NOTE — PROGRESS NOTES
[facility] progress note    Follow-Up on: 3/29/2018     Patient seen and examined. Currently NPO awaiting HD catheter. He denies any vomiting this morning. Uses IV heroine and meth. Last used this past weekend. States he was last tested for hepatitis about a year ago and was negative. ROS:  Gen - no fever, no chills, appetite npo  CV - no chest pain, no orthopnea  Lung - no shortness of breath, occasional cough  Abd - no tenderness, no nausea, no vomiting  Ext - noted edema    Exam:  Vitals:    03/29/18 0457 03/29/18 0555 03/29/18 0756 03/29/18 1147   BP:  130/78 136/76 139/58   Pulse:  (!) 46 (!) 59 (!) 53   Resp:  18 15 15   Temp:  98.3 °F (36.8 °C) 97.8 °F (36.6 °C) 98 °F (36.7 °C)   SpO2:  91% 94% 98%   Weight: 70.3 kg (155 lb)            Intake/Output Summary (Last 24 hours) at 03/29/18 1151  Last data filed at 03/28/18 1358   Gross per 24 hour   Intake              120 ml   Output              100 ml   Net               20 ml       GEN - in no distress  CV - S1, S2, no rub  Lung - clear bilaterally  Abd - soft, nontender  Ext - upper extremity edema    No results for input(s): WBC, HGB, HCT, PLT, HGBEXT, HCTEXT, PLTEXT in the last 72 hours. Recent Labs      03/29/18   0517  03/28/18   0621  03/27/18   0540   NA  136  136  137   K  4.1  4.0  4.1   CL  98  101  100   CO2  23  22  23   BUN  102*  96*  85*   CREA  12.70*  10.60*  7.72*   CA  8.5  8.3  8.5   GLU  104*  111*  102*   MG  2.5*  2.6*   --    PHOS   --    --   5.3*       Assessment / Plan:    1. AISSATOU - presumed glomerular in origin given 6 gms of nephrotic-range proteinuria, active urine sediment, low complements, and presumed hepatitis. Renal imaging unremarkable. Could have an underlying FSGS or hepatitis-mediated MPGN. 2.  Hepatitis C    3. Hx of polysubstance abuse/IVDA - heroine and meth    Planning HD catheter today and dialysis following placement.   Will have further discussions regarding kidney biopsy over weekend with him and whether we want to pursue - no urgency - get uremia under control to avoid any platelet dysfunction with the procedure. HD again tomorrow. Orders entered.

## 2018-03-29 NOTE — PROGRESS NOTES
Met with [employer] representative regarding disability application. Per representative, patient works for [employer] and makes $500 weekly. Representative states that patient will not qualify for ABD or SSI d/t tobacco and drug abuse.

## 2018-03-29 NOTE — PROGRESS NOTES
Call placed to [facility] regarding helping patient begin to apply for disability d/t needing dialysis. [employer] to begin process with patient.

## 2018-03-29 NOTE — PROCEDURES
Department of Interventional Radiology  [phone]        Interventional Radiology Brief Procedure Note    Patient: Sebastian Ovalles MRN: [id number]  SSN: [id number]    YOB: 1985  Age: 26 y.o. Sex: male      Date of Procedure: 3/29/2018    Pre-Procedure Diagnosis: ESRD    Post-Procedure Diagnosis: SAME    Procedure(s): Tunneled hemodialysis Venous Catheter    Brief Description of Procedure: RIJV. Performed By: Lilian Potter MD     Assistants: None    Anesthesia:Moderate Sedation    Estimated Blood Loss: Less than 10ml    Specimens: None    Implants: See Above    Findings: Tip in RA. Complications: None    Recommendations: 1 hour bedrest.  Elevate HOB.       Follow Up: PRN    Signed By: Lilian Potter MD     March 29, 2018

## 2018-03-29 NOTE — PROGRESS NOTES
Progress Note    Patient: Narda Caceres MRN: [id number]  SSN: [id number]    YOB: 1985  Age: 26 y.o. Sex: male      Admit Date: 3/26/2018    LOS: 3 days     Subjective:   Patient examined at bedside. He had no complains this morning. Kidney function worsening. He will have HD cath placement today. ROS: all pertinent findings described in my note. Objective:     Vitals:    03/29/18 0106 03/29/18 0457 03/29/18 0555 03/29/18 0756   BP: 153/77  130/78 136/76   Pulse: (!) 46  (!) 46 (!) 59   Resp: 18  18 15   Temp: 98.2 °F (36.8 °C)  98.3 °F (36.8 °C) 97.8 °F (36.6 °C)   SpO2: 95%  91% 94%   Weight:  70.3 kg (155 lb)          Intake and Output:  Current Shift:    Last three shifts: 03/27 1901 - 03/29 0700  In: 360 [P.O.:360]  Out: 300 [Urine:300]    Physical Exam:   GENERAL: alert, cooperative, no distress, appears stated age  EYE: negative  LYMPHATIC: Cervical, supraclavicular, and axillary nodes normal.   THROAT & NECK: normal and no erythema or exudates noted. LUNG: clear to auscultation bilaterally  HEART: regular rate and rhythm, S1, S2 normal, no murmur, click, rub or gallop  ABDOMEN: soft, but mild tenderness over epigastrium. Bowel sounds normal. No masses,  no organomegaly  EXTREMITIES:  extremities normal, atraumatic, no cyanosis or edema  SKIN: Normal.  NEUROLOGIC: negative- no asterixis   PSYCHIATRIC: non focal    Lab/Data Review: All lab results for the last 24 hours reviewed. Assessment:     Principal Problem:    ARF (acute renal failure) ([facility] [address] (3/26/2018)    Active Problems:    Heroin abuse (3/26/2018)      Tobacco abuse (3/26/2018)      Hepatitis (3/27/2018)      Rhabdomyolysis (3/27/2018)      Plan:     · continue D5/Half NS  · monitor strict I/O  · Avoid nephrotoxic meds  · Monitor electrolytes daily  · He does have elevated LFTS, positive for hep C, low complements level.    · Renal F/U for HD cath today   · SW for drug rehab options  · DVT ppx: heparin, GCS      Code Status: full  Care manager involvement for outpatient HD slot    Signed By: Randy Ny MD     March 29, 2018

## 2018-03-30 LAB
ALBUMIN SERPL-MCNC: 2.6 G/DL (ref 3.5–5)
ALBUMIN/GLOB SERPL: 0.9 {RATIO} (ref 1.2–3.5)
ALP SERPL-CCNC: 58 U/L (ref 50–136)
ALT SERPL-CCNC: 1029 U/L (ref 12–65)
ANION GAP SERPL CALC-SCNC: 13 MMOL/L (ref 7–16)
AST SERPL-CCNC: 34 U/L (ref 15–37)
BILIRUB DIRECT SERPL-MCNC: 0.2 MG/DL
BILIRUB SERPL-MCNC: 0.5 MG/DL (ref 0.2–1.1)
BUN SERPL-MCNC: 111 MG/DL (ref 6–23)
CALCIUM SERPL-MCNC: 8.6 MG/DL (ref 8.3–10.4)
CHLORIDE SERPL-SCNC: 101 MMOL/L (ref 98–107)
CO2 SERPL-SCNC: 20 MMOL/L (ref 21–32)
CREAT SERPL-MCNC: 14.3 MG/DL (ref 0.8–1.5)
GLOBULIN SER CALC-MCNC: 2.8 G/DL (ref 2.3–3.5)
GLUCOSE SERPL-MCNC: 127 MG/DL (ref 65–100)
MAGNESIUM SERPL-MCNC: 2.5 MG/DL (ref 1.8–2.4)
POTASSIUM SERPL-SCNC: 4.4 MMOL/L (ref 3.5–5.1)
PROT SERPL-MCNC: 5.4 G/DL (ref 6.3–8.2)
SODIUM SERPL-SCNC: 134 MMOL/L (ref 136–145)

## 2018-03-30 PROCEDURE — 74011000258 HC RX REV CODE- 258: Performed by: INTERNAL MEDICINE

## 2018-03-30 PROCEDURE — 74011250637 HC RX REV CODE- 250/637: Performed by: INTERNAL MEDICINE

## 2018-03-30 PROCEDURE — 90935 HEMODIALYSIS ONE EVALUATION: CPT

## 2018-03-30 PROCEDURE — 86580 TB INTRADERMAL TEST: CPT | Performed by: INTERNAL MEDICINE

## 2018-03-30 PROCEDURE — 65270000029 HC RM PRIVATE

## 2018-03-30 PROCEDURE — 80076 HEPATIC FUNCTION PANEL: CPT | Performed by: INTERNAL MEDICINE

## 2018-03-30 PROCEDURE — 74011250637 HC RX REV CODE- 250/637: Performed by: FAMILY MEDICINE

## 2018-03-30 PROCEDURE — 74011000302 HC RX REV CODE- 302: Performed by: INTERNAL MEDICINE

## 2018-03-30 PROCEDURE — 74011000250 HC RX REV CODE- 250: Performed by: INTERNAL MEDICINE

## 2018-03-30 PROCEDURE — 5A1D70Z PERFORMANCE OF URINARY FILTRATION, INTERMITTENT, LESS THAN 6 HOURS PER DAY: ICD-10-PCS | Performed by: INTERNAL MEDICINE

## 2018-03-30 PROCEDURE — 36415 COLL VENOUS BLD VENIPUNCTURE: CPT | Performed by: INTERNAL MEDICINE

## 2018-03-30 PROCEDURE — 83735 ASSAY OF MAGNESIUM: CPT | Performed by: INTERNAL MEDICINE

## 2018-03-30 PROCEDURE — 80048 BASIC METABOLIC PNL TOTAL CA: CPT | Performed by: INTERNAL MEDICINE

## 2018-03-30 PROCEDURE — 74011250636 HC RX REV CODE- 250/636: Performed by: INTERNAL MEDICINE

## 2018-03-30 RX ORDER — CALCIUM CARBONATE 200(500)MG
400 TABLET,CHEWABLE ORAL
Status: DISCONTINUED | OUTPATIENT
Start: 2018-03-30 | End: 2018-04-01

## 2018-03-30 RX ORDER — HYDRALAZINE HYDROCHLORIDE 20 MG/ML
10 INJECTION INTRAMUSCULAR; INTRAVENOUS
Status: DISCONTINUED | OUTPATIENT
Start: 2018-03-30 | End: 2018-04-06

## 2018-03-30 RX ADMIN — ACETAMINOPHEN 650 MG: 500 TABLET, FILM COATED ORAL at 20:00

## 2018-03-30 RX ADMIN — TUBERCULIN PURIFIED PROTEIN DERIVATIVE 5 UNITS: 5 INJECTION, SOLUTION INTRADERMAL at 13:32

## 2018-03-30 RX ADMIN — HEPARIN SODIUM 5000 UNITS: 5000 INJECTION, SOLUTION INTRAVENOUS; SUBCUTANEOUS at 13:33

## 2018-03-30 RX ADMIN — CALCIUM CARBONATE (ANTACID) CHEW TAB 500 MG 400 MG: 500 CHEW TAB at 11:07

## 2018-03-30 RX ADMIN — Medication 10 ML: at 21:59

## 2018-03-30 RX ADMIN — ACETAMINOPHEN 325 MG: 500 TABLET, FILM COATED ORAL at 11:59

## 2018-03-30 RX ADMIN — CALCIUM CARBONATE (ANTACID) CHEW TAB 500 MG 400 MG: 500 CHEW TAB at 00:29

## 2018-03-30 RX ADMIN — FAMOTIDINE 20 MG: 10 INJECTION, SOLUTION INTRAVENOUS at 08:28

## 2018-03-30 RX ADMIN — DEXTROSE MONOHYDRATE AND SODIUM CHLORIDE 150 ML/HR: 5; .45 INJECTION, SOLUTION INTRAVENOUS at 05:48

## 2018-03-30 RX ADMIN — ACETAMINOPHEN 650 MG: 500 TABLET, FILM COATED ORAL at 15:44

## 2018-03-30 RX ADMIN — Medication 10 ML: at 13:33

## 2018-03-30 RX ADMIN — ACETAMINOPHEN 650 MG: 500 TABLET, FILM COATED ORAL at 23:35

## 2018-03-30 RX ADMIN — HEPARIN SODIUM 5000 UNITS: 5000 INJECTION, SOLUTION INTRAVENOUS; SUBCUTANEOUS at 21:59

## 2018-03-30 NOTE — PROGRESS NOTES
Pt stated he began having problems the day after he took 2 to 3 supplements from 20 different bottles each. He also states his friends gave him the supplements and he doesn't know what they were. He states he can't remember if he told the doctor about the supplements or not. Hourly rounds performed during this shift; all needs met at this time. Bed in low/locked position and call light, personal items within reach. Will continue to monitor and give bedside shift report to oncoming day shift nurse.

## 2018-03-30 NOTE — DIALYSIS
HD treatment completed at 2.5 hours due to nausea and dizziness. No fluid removed per MD orders. VS stable, bp 135/116 at end of tx. CVC dressing clean, dry, and intact, tego caps intact, bilateral lumen flushed with 9cc NS and curos applied. Transport contacted for return to room. No complaints at this time.

## 2018-03-30 NOTE — PROGRESS NOTES
Call placed to [facility] admissions regarding getting a contract together since patient is self pay. Awaiting call back.

## 2018-03-30 NOTE — PROGRESS NOTES
Renal Progress Note    Admission Date: 3/26/2018   Subjective: The patient was seen on dialysis at 9:22 AM .  BP is stable. Catheter is functioning well.       Objective:     Physical Exam:    Patient Vitals for the past 8 hrs:   BP Temp Pulse Resp SpO2 Weight   03/30/18 0913 (!) 155/96 - (!) 58 - - -   03/30/18 0709 146/77 98 °F (36.7 °C) 71 18 96 % -   03/30/18 0428 - - - - - 70.5 kg (155 lb 8 oz)   03/30/18 0343 140/79 98.1 °F (36.7 °C) 65 18 94 % -      Gen: comfortable , NAD  HEENT: moist membranes  CV: S1, S2  Lungs: Clear bilaterally  Extem: no edema     Current Facility-Administered Medications   Medication Dose Route Frequency    calcium carbonate (TUMS) chewable tablet 400 mg [elemental]  400 mg Oral QID PRN    heparin (PF) 2 units/ml in NS infusion 2,000 Units  1,000 mL Irrigation CONTINUOUS    fentaNYL citrate (PF) injection 25-50 mcg  25-50 mcg IntraVENous Multiple    midazolam (VERSED) injection 0.5-2 mg  0.5-2 mg IntraVENous Multiple    famotidine (PF) (PEPCID) 20 mg in sodium chloride 10 mL injection  20 mg IntraVENous DAILY    alum-mag hydroxide-simeth (MYLANTA) oral suspension 30 mL  30 mL Oral QID PRN    dextrose 5 % - 0.45% NaCl infusion  150 mL/hr IntraVENous CONTINUOUS    sodium chloride (NS) flush 5-10 mL  5-10 mL IntraVENous Q8H    sodium chloride (NS) flush 5-10 mL  5-10 mL IntraVENous PRN    acetaminophen (TYLENOL) tablet 650 mg  650 mg Oral Q4H PRN    ondansetron (ZOFRAN) injection 4 mg  4 mg IntraVENous Q4H PRN    heparin (porcine) injection 5,000 Units  5,000 Units SubCUTAneous Q8H            Data Review:     LABS:   Recent Results (from the past 12 hour(s))   METABOLIC PANEL, BASIC    Collection Time: 03/30/18  5:31 AM   Result Value Ref Range    Sodium 134 (L) 136 - 145 mmol/L    Potassium 4.4 3.5 - 5.1 mmol/L    Chloride 101 98 - 107 mmol/L    CO2 20 (L) 21 - 32 mmol/L    Anion gap 13 7 - 16 mmol/L    Glucose 127 (H) 65 - 100 mg/dL     (H) 6 - 23 MG/DL Creatinine 14.30 (H) 0.8 - 1.5 MG/DL    GFR est AA 5 (L) >60 ml/min/1.73m2    GFR est non-AA 4 (L) >60 ml/min/1.73m2    Calcium 8.6 8.3 - 10.4 MG/DL   MAGNESIUM    Collection Time: 03/30/18  5:31 AM   Result Value Ref Range    Magnesium 2.5 (H) 1.8 - 2.4 mg/dL   HEPATIC FUNCTION PANEL    Collection Time: 03/30/18  5:31 AM   Result Value Ref Range    Protein, total 5.4 (L) 6.3 - 8.2 g/dL    Albumin 2.6 (L) 3.5 - 5.0 g/dL    Globulin 2.8 2.3 - 3.5 g/dL    A-G Ratio 0.9 (L) 1.2 - 3.5      Bilirubin, total 0.5 0.2 - 1.1 MG/DL    Bilirubin, direct 0.2 <0.4 MG/DL    Alk. phosphatase 58 50 - 136 U/L    AST (SGOT) 34 15 - 37 U/L    ALT (SGPT) 1029 (H) 12 - 65 U/L         Plan:     Principal Problem:    ARF (acute renal failure) ([facility]) (3/26/2018)    Active Problems:    Heroin abuse (3/26/2018)      Tobacco abuse (3/26/2018)      Hepatitis (3/27/2018)      Rhabdomyolysis (3/27/2018)      Chronic hepatitis C without hepatic coma ([facility] [address] (3/29/2018)    1. AISSATOU - presumed glomerular in origin given 6 gms of nephrotic-range proteinuria, active urine sediment, low complements, and presumed hepatitis. Renal imaging unremarkable. 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 Hepatitis C     3. Hx of polysubstance abuse/IVDA - heroine and meth     Dialysis today and again tomorrow  Will have further discussions regarding kidney biopsy over weekend with him and whether we want to pursue - no urgency - get uremia under control to avoid any platelet dysfunction with the procedure.     HD again tomorrow. Orders entered.

## 2018-03-30 NOTE — INTERDISCIPLINARY ROUNDS
Interdisciplinary team rounds were held 3/30/2018 with the following team members:Care Management, Nursing, Physical Therapy and Physician. Plan of care discussed. See clinical pathway and/or care plan for interventions and desired outcomes.

## 2018-03-30 NOTE — PROGRESS NOTES
Progress Note    Patient: eBnoit Ignram MRN: [id number]  SSN: [id number]    YOB: 1985  Age: 26 y.o. Sex: male      Admit Date: 3/26/2018    LOS: 4 days     Subjective:   Patient examined at bedside. Had fist session of HD today, no complications. No fluid removed. He had no complains. ROS: all pertinent findings described in my note. Objective:     Vitals:    03/29/18 2256 03/30/18 0343 03/30/18 0428 03/30/18 0709   BP: 148/89 140/79  146/77   Pulse: 68 65  71   Resp: 20 18  18   Temp: 97.9 °F (36.6 °C) 98.1 °F (36.7 °C)  98 °F (36.7 °C)   SpO2: 95% 94%  96%   Weight:   70.5 kg (155 lb 8 oz)         Intake and Output:  Current Shift:    Last three shifts: 03/28 1901 - 03/30 0700  In: 240 [P.O.:240]  Out: 100 [Urine:100]    Physical Exam:   GENERAL: alert, cooperative, no distress, appears stated age  EYE: negative  LYMPHATIC: Cervical, supraclavicular, and axillary nodes normal.   THROAT & NECK: normal and no erythema or exudates noted. RIJ cath in place. LUNG: clear to auscultation bilaterally  HEART: regular rate and rhythm, S1, S2 normal, no murmur, click, rub or gallop  ABDOMEN: soft, but mild tenderness over epigastrium. Bowel sounds normal. No masses,  no organomegaly  EXTREMITIES:  extremities normal, atraumatic, no cyanosis or edema  SKIN: Normal.  NEUROLOGIC: negative- no asterixis   PSYCHIATRIC: non focal    Lab/Data Review: All lab results for the last 24 hours reviewed.      Assessment:     Principal Problem:    ARF (acute renal failure) ([facility] [address] (3/26/2018)    Active Problems:    Heroin abuse (3/26/2018)      Tobacco abuse (3/26/2018)      Hepatitis (3/27/2018)      Rhabdomyolysis (3/27/2018)      Chronic hepatitis C without hepatic coma ([address] (3/29/2018)      Plan:     · Start renal diet   · Avoid nephrotoxic meds  · Monitor electrolytes daily  · He does have elevated LFTS, positive for hep C, low complements level, possible etiology FSGS or Hep C MPGN  · Renal team will discuss renal biopsy options with patient   · For HD tomorrow   · DVT ppx: heparin, GCS      Code Status: full    Care manager involvement for outpatient HD slot    Signed By: Rosemary Baker MD     March 30, 2018

## 2018-03-30 NOTE — DIALYSIS
Treatment initiated using Right CVC by Marcell Harrison. Both ports aspirated and flushed without problems. Machine set per MD orders. Pt VS stable. Will continue to monitor. Consent signed and placed in chart.

## 2018-03-30 NOTE — PROGRESS NOTES
Problem: Falls - Risk of  Goal: *Absence of Falls  Document Veronica Fall Risk and appropriate interventions in the flowsheet.    Outcome: Progressing Towards Goal  Fall Risk Interventions:  Mobility Interventions: Assess mobility with egress test    Mentation Interventions: Door open when patient unattended    Medication Interventions: Patient to call before getting OOB, Teach patient to arise slowly    Elimination Interventions: Call light in reach    History of Falls Interventions: Consult care management for discharge planning, Door open when patient unattended, Evaluate medications/consider consulting pharmacy, Room close to nurse's station Work

## 2018-03-31 ENCOUNTER — APPOINTMENT (OUTPATIENT)
Dept: GENERAL RADIOLOGY | Age: 33
DRG: 674 | End: 2018-03-31
Attending: INTERNAL MEDICINE
Payer: SELF-PAY

## 2018-03-31 LAB
ALBUMIN SERPL-MCNC: 2.5 G/DL (ref 3.5–5)
ALBUMIN/GLOB SERPL: 0.8 {RATIO} (ref 1.2–3.5)
ALP SERPL-CCNC: 54 U/L (ref 50–136)
ALT SERPL-CCNC: 699 U/L (ref 12–65)
ANION GAP SERPL CALC-SCNC: 10 MMOL/L (ref 7–16)
AST SERPL-CCNC: 25 U/L (ref 15–37)
BILIRUB DIRECT SERPL-MCNC: 0.2 MG/DL
BILIRUB SERPL-MCNC: 0.6 MG/DL (ref 0.2–1.1)
BUN SERPL-MCNC: 77 MG/DL (ref 6–23)
CALCIUM SERPL-MCNC: 8.6 MG/DL (ref 8.3–10.4)
CHLORIDE SERPL-SCNC: 100 MMOL/L (ref 98–107)
CO2 SERPL-SCNC: 25 MMOL/L (ref 21–32)
CREAT SERPL-MCNC: 12.2 MG/DL (ref 0.8–1.5)
GLOBULIN SER CALC-MCNC: 3 G/DL (ref 2.3–3.5)
GLUCOSE SERPL-MCNC: 97 MG/DL (ref 65–100)
MAGNESIUM SERPL-MCNC: 2.2 MG/DL (ref 1.8–2.4)
MM INDURATION POC: 0 MM (ref 0–5)
POTASSIUM SERPL-SCNC: 4.6 MMOL/L (ref 3.5–5.1)
PPD POC: NORMAL NEGATIVE
PROT SERPL-MCNC: 5.5 G/DL (ref 6.3–8.2)
SODIUM SERPL-SCNC: 135 MMOL/L (ref 136–145)

## 2018-03-31 PROCEDURE — 90935 HEMODIALYSIS ONE EVALUATION: CPT

## 2018-03-31 PROCEDURE — 83735 ASSAY OF MAGNESIUM: CPT | Performed by: INTERNAL MEDICINE

## 2018-03-31 PROCEDURE — 36415 COLL VENOUS BLD VENIPUNCTURE: CPT | Performed by: INTERNAL MEDICINE

## 2018-03-31 PROCEDURE — 80076 HEPATIC FUNCTION PANEL: CPT | Performed by: INTERNAL MEDICINE

## 2018-03-31 PROCEDURE — 74011250637 HC RX REV CODE- 250/637: Performed by: INTERNAL MEDICINE

## 2018-03-31 PROCEDURE — 74018 RADEX ABDOMEN 1 VIEW: CPT

## 2018-03-31 PROCEDURE — 74011250637 HC RX REV CODE- 250/637: Performed by: FAMILY MEDICINE

## 2018-03-31 PROCEDURE — 80048 BASIC METABOLIC PNL TOTAL CA: CPT | Performed by: INTERNAL MEDICINE

## 2018-03-31 PROCEDURE — 74011250636 HC RX REV CODE- 250/636: Performed by: INTERNAL MEDICINE

## 2018-03-31 PROCEDURE — 65270000029 HC RM PRIVATE

## 2018-03-31 RX ORDER — CALCIUM CARBONATE 200(500)MG
400 TABLET,CHEWABLE ORAL
Status: DISCONTINUED | OUTPATIENT
Start: 2018-03-31 | End: 2018-04-16 | Stop reason: HOSPADM

## 2018-03-31 RX ORDER — MAG HYDROX/ALUMINUM HYD/SIMETH 200-200-20
30 SUSPENSION, ORAL (FINAL DOSE FORM) ORAL ONCE
Status: DISPENSED | OUTPATIENT
Start: 2018-03-31 | End: 2018-04-01

## 2018-03-31 RX ORDER — ONDANSETRON 8 MG/1
8 TABLET, ORALLY DISINTEGRATING ORAL ONCE
Status: COMPLETED | OUTPATIENT
Start: 2018-03-31 | End: 2018-03-31

## 2018-03-31 RX ADMIN — HEPARIN SODIUM 5000 UNITS: 5000 INJECTION, SOLUTION INTRAVENOUS; SUBCUTANEOUS at 22:23

## 2018-03-31 RX ADMIN — HEPARIN SODIUM 5000 UNITS: 5000 INJECTION, SOLUTION INTRAVENOUS; SUBCUTANEOUS at 14:39

## 2018-03-31 RX ADMIN — CALCIUM CARBONATE (ANTACID) CHEW TAB 500 MG 400 MG: 500 CHEW TAB at 04:01

## 2018-03-31 RX ADMIN — Medication 10 ML: at 22:23

## 2018-03-31 RX ADMIN — Medication 10 ML: at 06:14

## 2018-03-31 RX ADMIN — Medication 10 ML: at 14:39

## 2018-03-31 RX ADMIN — ONDANSETRON 8 MG: 8 TABLET, ORALLY DISINTEGRATING ORAL at 17:31

## 2018-03-31 RX ADMIN — HEPARIN SODIUM 5000 UNITS: 5000 INJECTION, SOLUTION INTRAVENOUS; SUBCUTANEOUS at 06:14

## 2018-03-31 NOTE — PROGRESS NOTES
Brief visit with patient to build rapport. Could see the struggle through his expressions and words.   Will continue to find time to explore more  Patient was receptive to visit    Reema Donovan, staff [address] [facility]  /   [email or web address]

## 2018-03-31 NOTE — DIALYSIS
Treatment initiated using Right CVC by James Bermudez. Both ports aspirated and flushed without problems. Machine set per MD orders. Pt VS stable. Will continue to monitor.

## 2018-03-31 NOTE — PROGRESS NOTES
Off dialysis at 11:30, by MARISELA [facility] [place]. 1 Kg removed. Dialysis Catheter flushed with 10 ml NS per protocol. Vital signs  stable . Pt alert and oriented x4. Pt to Radiology after treatment completed.

## 2018-03-31 NOTE — PROGRESS NOTES
Problem: Falls - Risk of  Goal: *Absence of Falls  Document Veronica Fall Risk and appropriate interventions in the flowsheet.    Outcome: Progressing Towards Goal  Fall Risk Interventions:  Mobility Interventions: Assess mobility with egress test    Mentation Interventions: Door open when patient unattended    Medication Interventions: Patient to call before getting OOB, Teach patient to arise slowly    Elimination Interventions: Call light in reach    History of Falls Interventions: Consult care management for discharge planning, Door open when patient unattended, Evaluate medications/consider consulting pharmacy, Room close to nurse's station

## 2018-03-31 NOTE — PROGRESS NOTES
Progress Note    Patient: Chela Monroy MRN: [id number]  SSN: [id number]    YOB: 1985  Age: 26 y.o. Sex: male      Admit Date: 3/26/2018    LOS: 5 days     Subjective:   Patient examined at bedside. He tolerated HD session with no complications. Has had nausea and abdominal pain. An KUB showed possible enteritis, but so far no fever, no vomit, no diarrhea. ROS: all pertinent findings described in my note. Objective:     Vitals:    03/30/18 2301 03/31/18 0723 03/31/18 0729 03/31/18 0758   BP:  (!) 160/105 (!) 174/99 147/79   Pulse:  (!) 59 (!) 58 62   Resp: 15 16     Temp: 98 °F (36.7 °C) 98.3 °F (36.8 °C)     SpO2: 94% 95%     Weight:            Intake and Output:  Current Shift:    Last three shifts: 03/29 1901 - 03/31 0700  In: 240 [P.O.:240]  Out: 200 [Urine:200]    Physical Exam:   GENERAL: alert, cooperative, no distress, appears stated age  EYE: negative  LYMPHATIC: Cervical, supraclavicular, and axillary nodes normal.   THROAT & NECK: normal and no erythema or exudates noted. RIJ cath in place. LUNG: clear to auscultation bilaterally  HEART: regular rate and rhythm, S1, S2 normal, no murmur, click, rub or gallop  ABDOMEN: soft, but mild tenderness over epigastrium. Bowel sounds normal. No masses,  no organomegaly  EXTREMITIES:  extremities normal, atraumatic, no cyanosis or edema  SKIN: Normal.  NEUROLOGIC: negative- no asterixis   PSYCHIATRIC: non focal    Lab/Data Review: All lab results for the last 24 hours reviewed.      Assessment:     Principal Problem:    ARF (acute renal failure) ([address] (3/26/2018)    Active Problems:    Heroin abuse (3/26/2018)      Tobacco abuse (3/26/2018)      Hepatitis (3/27/2018)      Rhabdomyolysis (3/27/2018)      Chronic hepatitis C without hepatic coma ([address] (3/29/2018)      Plan:     · Avoid nephrotoxic meds  · Monitor electrolytes daily  · Pain control   · Watch for diarrhea, no need for antibiotics for now   · He does have elevated LFTS, positive for hep C, low complements level, possible etiology FSGS or Hep C MPGN  · Renal team will discuss renal biopsy options with patient   · DVT ppx: heparin, GCS      Code Status: full    Care manager involvement for outpatient HD slot    Signed By: Leena Mosley MD     March 31, 2018

## 2018-03-31 NOTE — PROGRESS NOTES
Renal Progress Note    Admission Date: 3/26/2018   Subjective: The patient was seen on dialysis at 8:35 . BP is stable. Catheter is functioning well.       Objective:     Physical Exam:    Patient Vitals for the past 8 hrs:   BP Temp Pulse Resp SpO2   03/31/18 0826 146/88 - 63 - -   03/31/18 0758 147/79 - 62 - -   03/31/18 0729 (!) 174/99 - (!) 58 - -   03/31/18 0723 (!) 160/105 98.3 °F (36.8 °C) (!) 59 16 95 %      Gen: comfortable , NAD  HEENT: moist membranes  CV: S1, S2  Lungs: Clear bilaterally  Abdomen: tender   Extem: no edema     Current Facility-Administered Medications   Medication Dose Route Frequency    calcium carbonate (TUMS) chewable tablet 400 mg [elemental]  400 mg Oral QID PRN    tuberculin injection 5 Units  5 Units IntraDERMal ONCE    hydrALAZINE (APRESOLINE) 20 mg/mL injection 10 mg  10 mg IntraVENous Q6H PRN    famotidine (PF) (PEPCID) 20 mg in sodium chloride 10 mL injection  20 mg IntraVENous DAILY    alum-mag hydroxide-simeth (MYLANTA) oral suspension 30 mL  30 mL Oral QID PRN    sodium chloride (NS) flush 5-10 mL  5-10 mL IntraVENous Q8H    sodium chloride (NS) flush 5-10 mL  5-10 mL IntraVENous PRN    acetaminophen (TYLENOL) tablet 650 mg  650 mg Oral Q4H PRN    ondansetron (ZOFRAN) injection 4 mg  4 mg IntraVENous Q4H PRN    heparin (porcine) injection 5,000 Units  5,000 Units SubCUTAneous Q8H            Data Review:     LABS:   Recent Results (from the past 12 hour(s))   METABOLIC PANEL, BASIC    Collection Time: 03/31/18  6:07 AM   Result Value Ref Range    Sodium 135 (L) 136 - 145 mmol/L    Potassium 4.6 3.5 - 5.1 mmol/L    Chloride 100 98 - 107 mmol/L    CO2 25 21 - 32 mmol/L    Anion gap 10 7 - 16 mmol/L    Glucose 97 65 - 100 mg/dL    BUN 77 (H) 6 - 23 MG/DL    Creatinine 12.20 (H) 0.8 - 1.5 MG/DL    GFR est AA 6 (L) >60 ml/min/1.73m2    GFR est non-AA 5 (L) >60 ml/min/1.73m2    Calcium 8.6 8.3 - 10.4 MG/DL   MAGNESIUM    Collection Time: 03/31/18  6:07 AM   Result Value Ref Range    Magnesium 2.2 1.8 - 2.4 mg/dL   HEPATIC FUNCTION PANEL    Collection Time: 03/31/18  6:07 AM   Result Value Ref Range    Protein, total 5.5 (L) 6.3 - 8.2 g/dL    Albumin 2.5 (L) 3.5 - 5.0 g/dL    Globulin 3.0 2.3 - 3.5 g/dL    A-G Ratio 0.8 (L) 1.2 - 3.5      Bilirubin, total 0.6 0.2 - 1.1 MG/DL    Bilirubin, direct 0.2 <0.4 MG/DL    Alk. phosphatase 54 50 - 136 U/L    AST (SGOT) 25 15 - 37 U/L    ALT (SGPT) 699 (H) 12 - 65 U/L         Plan:     Principal Problem:    ARF (acute renal failure) (HCC) (3/26/2018)    Active Problems:    Heroin abuse (3/26/2018)      Tobacco abuse (3/26/2018)      Hepatitis (3/27/2018)      Rhabdomyolysis (3/27/2018)      Chronic hepatitis C without hepatic coma ([facility] [address] (3/29/2018)    1. AISSATOU - presumed glomerular in origin given 6 gms of nephrotic-range proteinuria, active urine sediment, low complements, and presumed hepatitis. Renal imaging unremarkable. 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 Hepatitis C     3. Hx of polysubstance abuse/IVDA - heroine and meth     Dialysis again today Will have further discussions regarding kidney biopsy over weekend with him and whether we want to pursue - no urgency - get uremia under control to avoid any platelet dysfunction with the procedure.       4. Abdominal pain - with nausea. No BM.   Check upright KUB for ileus/SBO

## 2018-03-31 NOTE — PROGRESS NOTES
On dialysis via  catheter, by Yu Ohara RN. Catheter aspirated and flushed with NS without difficulty. Vital signs stable. Pt noted alert and oriented x 4. Heparin dose is 0. Dialysis machine alarms WNL. Will continue to monitor throughout treatment.

## 2018-04-01 LAB
ALBUMIN SERPL-MCNC: 2.3 G/DL (ref 3.5–5)
ALBUMIN/GLOB SERPL: 0.8 {RATIO} (ref 1.2–3.5)
ALP SERPL-CCNC: 48 U/L (ref 50–136)
ALT SERPL-CCNC: 480 U/L (ref 12–65)
ANION GAP SERPL CALC-SCNC: 8 MMOL/L (ref 7–16)
AST SERPL-CCNC: 22 U/L (ref 15–37)
BILIRUB DIRECT SERPL-MCNC: 0.1 MG/DL
BILIRUB SERPL-MCNC: 0.5 MG/DL (ref 0.2–1.1)
BUN SERPL-MCNC: 42 MG/DL (ref 6–23)
CALCIUM SERPL-MCNC: 8.8 MG/DL (ref 8.3–10.4)
CHLORIDE SERPL-SCNC: 101 MMOL/L (ref 98–107)
CO2 SERPL-SCNC: 28 MMOL/L (ref 21–32)
CREAT SERPL-MCNC: 9.42 MG/DL (ref 0.8–1.5)
ERYTHROCYTE [DISTWIDTH] IN BLOOD BY AUTOMATED COUNT: 12.7 % (ref 11.9–14.6)
GLOBULIN SER CALC-MCNC: 3 G/DL (ref 2.3–3.5)
GLUCOSE SERPL-MCNC: 89 MG/DL (ref 65–100)
HCT VFR BLD AUTO: 36.7 % (ref 41.1–50.3)
HGB BLD-MCNC: 13.1 G/DL (ref 13.6–17.2)
MAGNESIUM SERPL-MCNC: 2.1 MG/DL (ref 1.8–2.4)
MCH RBC QN AUTO: 31.6 PG (ref 26.1–32.9)
MCHC RBC AUTO-ENTMCNC: 35.7 G/DL (ref 31.4–35)
MCV RBC AUTO: 88.4 FL (ref 79.6–97.8)
MM INDURATION POC: NORMAL MM (ref 0–5)
PLATELET # BLD AUTO: 158 K/UL (ref 150–450)
PMV BLD AUTO: 9.9 FL (ref 10.8–14.1)
POTASSIUM SERPL-SCNC: 5.1 MMOL/L (ref 3.5–5.1)
PPD POC: NORMAL NEGATIVE
PROT SERPL-MCNC: 5.3 G/DL (ref 6.3–8.2)
RBC # BLD AUTO: 4.15 M/UL (ref 4.23–5.67)
SODIUM SERPL-SCNC: 137 MMOL/L (ref 136–145)
WBC # BLD AUTO: 8.1 K/UL (ref 4.3–11.1)

## 2018-04-01 PROCEDURE — 80048 BASIC METABOLIC PNL TOTAL CA: CPT | Performed by: INTERNAL MEDICINE

## 2018-04-01 PROCEDURE — 36415 COLL VENOUS BLD VENIPUNCTURE: CPT | Performed by: INTERNAL MEDICINE

## 2018-04-01 PROCEDURE — 74011250636 HC RX REV CODE- 250/636: Performed by: INTERNAL MEDICINE

## 2018-04-01 PROCEDURE — 65270000029 HC RM PRIVATE

## 2018-04-01 PROCEDURE — 74011250637 HC RX REV CODE- 250/637: Performed by: FAMILY MEDICINE

## 2018-04-01 PROCEDURE — 83735 ASSAY OF MAGNESIUM: CPT | Performed by: INTERNAL MEDICINE

## 2018-04-01 PROCEDURE — 85027 COMPLETE CBC AUTOMATED: CPT | Performed by: INTERNAL MEDICINE

## 2018-04-01 PROCEDURE — 74011250637 HC RX REV CODE- 250/637: Performed by: INTERNAL MEDICINE

## 2018-04-01 PROCEDURE — 80076 HEPATIC FUNCTION PANEL: CPT | Performed by: INTERNAL MEDICINE

## 2018-04-01 RX ORDER — TRAMADOL HYDROCHLORIDE 50 MG/1
50 TABLET ORAL
Status: DISCONTINUED | OUTPATIENT
Start: 2018-04-01 | End: 2018-04-16 | Stop reason: HOSPADM

## 2018-04-01 RX ORDER — SERTRALINE HYDROCHLORIDE 50 MG/1
25 TABLET, FILM COATED ORAL EVERY EVENING
Status: DISCONTINUED | OUTPATIENT
Start: 2018-04-01 | End: 2018-04-01

## 2018-04-01 RX ORDER — PANTOPRAZOLE SODIUM 40 MG/1
40 TABLET, DELAYED RELEASE ORAL
Status: DISCONTINUED | OUTPATIENT
Start: 2018-04-01 | End: 2018-04-16 | Stop reason: HOSPADM

## 2018-04-01 RX ORDER — DIPHENHYDRAMINE HCL 25 MG
25 CAPSULE ORAL
Status: DISCONTINUED | OUTPATIENT
Start: 2018-04-01 | End: 2018-04-16 | Stop reason: HOSPADM

## 2018-04-01 RX ADMIN — HYDRALAZINE HYDROCHLORIDE 10 MG: 20 INJECTION INTRAMUSCULAR; INTRAVENOUS at 20:47

## 2018-04-01 RX ADMIN — PANTOPRAZOLE SODIUM 40 MG: 40 TABLET, DELAYED RELEASE ORAL at 10:10

## 2018-04-01 RX ADMIN — Medication 10 ML: at 06:18

## 2018-04-01 RX ADMIN — Medication 10 ML: at 22:20

## 2018-04-01 RX ADMIN — HEPARIN SODIUM 5000 UNITS: 5000 INJECTION, SOLUTION INTRAVENOUS; SUBCUTANEOUS at 14:19

## 2018-04-01 RX ADMIN — HEPARIN SODIUM 5000 UNITS: 5000 INJECTION, SOLUTION INTRAVENOUS; SUBCUTANEOUS at 06:19

## 2018-04-01 RX ADMIN — ACETAMINOPHEN 650 MG: 500 TABLET, FILM COATED ORAL at 01:00

## 2018-04-01 RX ADMIN — Medication 10 ML: at 15:20

## 2018-04-01 RX ADMIN — DIPHENHYDRAMINE HYDROCHLORIDE 25 MG: 25 CAPSULE ORAL at 01:00

## 2018-04-01 RX ADMIN — Medication 10 ML: at 06:03

## 2018-04-01 RX ADMIN — HEPARIN SODIUM 5000 UNITS: 5000 INJECTION, SOLUTION INTRAVENOUS; SUBCUTANEOUS at 22:20

## 2018-04-01 NOTE — PROGRESS NOTES
Patient is awake denies any more HA at present time. States benadryl did help him get some rest last night. Norm Lazcano Hourly rounds done as needed.

## 2018-04-01 NOTE — PROGRESS NOTES
Progress Note    Patient: Jacobo Forrester MRN: [id number]  SSN: [id number]    YOB: 1985  Age: 26 y.o. Sex: male      Admit Date: 3/26/2018    LOS: 6 days     Subjective:   Patient examined at bedside. He stated having dull like abdominal pain, denies cramps, bloating, diarrhea, nor vomiting. He said his abdominal pain has been present for the past 5 days. He was noted tearful by nurse personnel and stated been worry about his kidney condition. Denies any suicidal nor homicidal thoughts. He is eating. ROS: all pertinent findings described in my note. Objective:     Vitals:    03/31/18 2326 04/01/18 0351 04/01/18 0457 04/01/18 0724   BP: 127/71 147/88  154/87   Pulse: 94 61  64   Resp: 18 19 18   Temp: 98.6 °F (37 °C) 97.9 °F (36.6 °C)  98.1 °F (36.7 °C)   SpO2: 100% 96%  95%   Weight:   70.4 kg (155 lb 1.6 oz)         Intake and Output:  Current Shift:    Last three shifts: 03/30 1901 - 04/01 0700  In: 600 [P.O.:600]  Out: 1700 [Urine:700]    Physical Exam:   GENERAL: alert, cooperative, no distress, appears stated age  EYE: negative  LYMPHATIC: Cervical, supraclavicular, and axillary nodes normal.   THROAT & NECK: normal and no erythema or exudates noted. RIJ cath in place. LUNG: clear to auscultation bilaterally  HEART: regular rate and rhythm, S1, S2 normal, no murmur, click, rub or gallop  ABDOMEN: soft, but mild tenderness over epigastrium. Bowel sounds normal. No masses,  no organomegaly  EXTREMITIES:  extremities normal, atraumatic, no cyanosis or edema  SKIN: Normal.  NEUROLOGIC: negative- no asterixis   PSYCHIATRIC: non focal    Lab/Data Review: All lab results for the last 24 hours reviewed.      Assessment:     Principal Problem:    ARF (acute renal failure) ([facility] [address] (3/26/2018)    Active Problems:    Heroin abuse (3/26/2018)      Tobacco abuse (3/26/2018)      Hepatitis (3/27/2018)      Rhabdomyolysis (3/27/2018)      Chronic hepatitis C without hepatic coma ([facility] (3/29/2018)      Plan:     · Avoid nephrotoxic meds  · Monitor electrolytes daily  · Pain control   · He does have elevated LFTS, positive for hep C, low complements level, possible etiology FSGS or Hep C MPGN  · Renal team f/u: for HD tomorrow and kidney biopsy Tuesday   · If he persists with sadness or stops eating due to woriness, may consider starting zoloft   · DVT ppx: heparin, GCS      Code Status: full    Care manager involvement for outpatient HD slot    Signed By: Ivory Habermann, MD     April 1, 2018

## 2018-04-01 NOTE — PROGRESS NOTES
Renal Progress Note    Admission Date: 3/26/2018   Subjective:      Still with some abdominal pain    Objective:     Physical Exam:    Patient Vitals for the past 8 hrs:   BP Temp Pulse Resp SpO2 Weight   04/01/18 1144 (!) 157/95 98.1 °F (36.7 °C) 64 18 96 % -   04/01/18 0724 154/87 98.1 °F (36.7 °C) 64 18 95 % -   04/01/18 0457 - - - - - 70.4 kg (155 lb 1.6 oz)      Gen: comfortable , NAD  HEENT: moist membranes  CV: S1, S2  Lungs: Clear bilaterally  Abdomen: tender   Extem: no edema     Current Facility-Administered Medications   Medication Dose Route Frequency    diphenhydrAMINE (BENADRYL) capsule 25 mg  25 mg Oral QHS PRN    pantoprazole (PROTONIX) tablet 40 mg  40 mg Oral ACB    traMADol (ULTRAM) tablet 50 mg  50 mg Oral Q6H PRN    calcium carbonate (TUMS) chewable tablet 400 mg [elemental]  400 mg Oral TID PRN    hydrALAZINE (APRESOLINE) 20 mg/mL injection 10 mg  10 mg IntraVENous Q6H PRN    sodium chloride (NS) flush 5-10 mL  5-10 mL IntraVENous Q8H    sodium chloride (NS) flush 5-10 mL  5-10 mL IntraVENous PRN    acetaminophen (TYLENOL) tablet 650 mg  650 mg Oral Q4H PRN    ondansetron (ZOFRAN) injection 4 mg  4 mg IntraVENous Q4H PRN    heparin (porcine) injection 5,000 Units  5,000 Units SubCUTAneous Q8H            Data Review:     LABS:   Recent Results (from the past 12 hour(s))   MAGNESIUM    Collection Time: 04/01/18  5:15 AM   Result Value Ref Range    Magnesium 2.1 1.8 - 2.4 mg/dL   HEPATIC FUNCTION PANEL    Collection Time: 04/01/18  5:15 AM   Result Value Ref Range    Protein, total 5.3 (L) 6.3 - 8.2 g/dL    Albumin 2.3 (L) 3.5 - 5.0 g/dL    Globulin 3.0 2.3 - 3.5 g/dL    A-G Ratio 0.8 (L) 1.2 - 3.5      Bilirubin, total 0.5 0.2 - 1.1 MG/DL    Bilirubin, direct 0.1 <0.4 MG/DL    Alk.  phosphatase 48 (L) 50 - 136 U/L    AST (SGOT) 22 15 - 37 U/L    ALT (SGPT) 480 (H) 12 - 65 U/L   METABOLIC PANEL, BASIC    Collection Time: 04/01/18  5:15 AM   Result Value Ref Range    Sodium 137 136 - 145 mmol/L    Potassium 5.1 3.5 - 5.1 mmol/L    Chloride 101 98 - 107 mmol/L    CO2 28 21 - 32 mmol/L    Anion gap 8 7 - 16 mmol/L    Glucose 89 65 - 100 mg/dL    BUN 42 (H) 6 - 23 MG/DL    Creatinine 9.42 (H) 0.8 - 1.5 MG/DL    GFR est AA 8 (L) >60 ml/min/1.73m2    GFR est non-AA 7 (L) >60 ml/min/1.73m2    Calcium 8.8 8.3 - 10.4 MG/DL   CBC W/O DIFF    Collection Time: 04/01/18  5:15 AM   Result Value Ref Range    WBC 8.1 4.3 - 11.1 K/uL    RBC 4.15 (L) 4.23 - 5.67 M/uL    HGB 13.1 (L) 13.6 - 17.2 g/dL    HCT 36.7 (L) 41.1 - 50.3 %    MCV 88.4 79.6 - 97.8 FL    MCH 31.6 26.1 - 32.9 PG    MCHC 35.7 (H) 31.4 - 35.0 g/dL    RDW 12.7 11.9 - 14.6 %    PLATELET 382 498 - 168 K/uL    MPV 9.9 (L) 10.8 - 14.1 FL         Plan:     Principal Problem:    ARF (acute renal failure) (HCC) (3/26/2018)    Active Problems:    Heroin abuse (3/26/2018)      Tobacco abuse (3/26/2018)      Hepatitis (3/27/2018)      Rhabdomyolysis (3/27/2018)      Chronic hepatitis C without hepatic coma ([facility] [address] (3/29/2018)    1. AISSATOU - presumed glomerular in origin given 6 gms of nephrotic-range proteinuria, active urine sediment, low complements, and presumed hepatitis. Renal imaging unremarkable. Could have an underlying FSGS or hepatitis C-mediated MPGN.( fits with the urine sediment and complement levels )- has dialyzed for clearance and metabolic issues are better. Will plan more dialysis tomorrow. Discussed biopsy for definitive diagnosis, prognosis, treatment options. He is agreeable but wants to think about it another day. Will plan dialysis tomorrow and kidney biopsy on Tues     2. Hepatitis C     3. Hx of polysubstance abuse/IVDA - heroine and meth            4. Abdominal pain - with nausea.  No ileus/SBO

## 2018-04-02 LAB
ALBUMIN SERPL-MCNC: 2.2 G/DL (ref 3.5–5)
ALBUMIN/GLOB SERPL: 0.7 {RATIO} (ref 1.2–3.5)
ALP SERPL-CCNC: 47 U/L (ref 50–136)
ALT SERPL-CCNC: 362 U/L (ref 12–65)
AST SERPL-CCNC: 17 U/L (ref 15–37)
BILIRUB DIRECT SERPL-MCNC: 0.2 MG/DL
BILIRUB SERPL-MCNC: 0.4 MG/DL (ref 0.2–1.1)
GLOBULIN SER CALC-MCNC: 3.1 G/DL (ref 2.3–3.5)
MAGNESIUM SERPL-MCNC: 2.1 MG/DL (ref 1.8–2.4)
MM INDURATION POC: NORMAL 0 (ref 0–5)
PPD POC: NORMAL NEGATIVE
PROT SERPL-MCNC: 5.3 G/DL (ref 6.3–8.2)

## 2018-04-02 PROCEDURE — 80076 HEPATIC FUNCTION PANEL: CPT | Performed by: INTERNAL MEDICINE

## 2018-04-02 PROCEDURE — 74011250637 HC RX REV CODE- 250/637: Performed by: INTERNAL MEDICINE

## 2018-04-02 PROCEDURE — 65270000029 HC RM PRIVATE

## 2018-04-02 PROCEDURE — 36415 COLL VENOUS BLD VENIPUNCTURE: CPT | Performed by: INTERNAL MEDICINE

## 2018-04-02 PROCEDURE — 74011250636 HC RX REV CODE- 250/636: Performed by: INTERNAL MEDICINE

## 2018-04-02 PROCEDURE — 90935 HEMODIALYSIS ONE EVALUATION: CPT

## 2018-04-02 PROCEDURE — 83735 ASSAY OF MAGNESIUM: CPT | Performed by: INTERNAL MEDICINE

## 2018-04-02 RX ADMIN — ACETAMINOPHEN 650 MG: 500 TABLET, FILM COATED ORAL at 05:00

## 2018-04-02 RX ADMIN — HEPARIN SODIUM 5000 UNITS: 5000 INJECTION, SOLUTION INTRAVENOUS; SUBCUTANEOUS at 14:51

## 2018-04-02 RX ADMIN — Medication 10 ML: at 05:02

## 2018-04-02 RX ADMIN — ACETAMINOPHEN 650 MG: 500 TABLET, FILM COATED ORAL at 00:03

## 2018-04-02 RX ADMIN — ONDANSETRON 4 MG: 2 INJECTION INTRAMUSCULAR; INTRAVENOUS at 21:45

## 2018-04-02 RX ADMIN — ACETAMINOPHEN 650 MG: 500 TABLET, FILM COATED ORAL at 21:46

## 2018-04-02 RX ADMIN — HEPARIN SODIUM 5000 UNITS: 5000 INJECTION, SOLUTION INTRAVENOUS; SUBCUTANEOUS at 21:45

## 2018-04-02 RX ADMIN — PANTOPRAZOLE SODIUM 40 MG: 40 TABLET, DELAYED RELEASE ORAL at 05:02

## 2018-04-02 RX ADMIN — ONDANSETRON 4 MG: 2 INJECTION INTRAMUSCULAR; INTRAVENOUS at 00:03

## 2018-04-02 RX ADMIN — Medication 10 ML: at 14:51

## 2018-04-02 NOTE — PROGRESS NOTES
Interdisciplinary Rounds completed 04/02/2018. Nursing, Case Management, Physician and PT present. Plan of care reviewed and updated. Case Management assisting on finding placement.

## 2018-04-02 NOTE — DIALYSIS
Hemodialysis treatment initiated using R perm cath by Fernie Ivory RN. Aspirated and flushed both ports without problem. Dressing replaced -clean, dry and intact, tegos replaced. Pt alert and orientedx4. Bradycardia noted. Machine settings per MD order. Will monitor during treatment.     Patient Vitals for the past 4 hrs:   Temp Pulse Resp BP SpO2   04/02/18 0715 - (!) 55 - (!) 141/94 -   04/02/18 0710 - (!) 55 18 149/90 -   04/02/18 0405 98.1 °F (36.7 °C) 72 18 144/86 95 %

## 2018-04-02 NOTE — PROGRESS NOTES
Problem: Nutrition Deficit  Goal: *Optimize nutritional status  Nutrition LOS Note: Day 7  Assessment  Diet order(s): Renal  Food,Nutrition, and Pertinent History: Patient presents with no acute nutrition risk factors identified by malnutrition screening tool upon admission. The patient is noted to have a h/o polysubstance abuse (heroine an meth) and hepatitis C. The patient reports that his appetite is doing well. States that he really wants some milk with breakfast. RD reinforced renal diet restrictions and allow for patient to have 1 dairy serving daily. The patient requests no bread with meals but would rather have double portions. Anthropometrics: Height: 5' 7\", Weight Source: Bed, Weight: 74.5 kg (164 lb 4.8 oz), Body mass index is 25.73 kg/(m^2). BMI class of overweight. Macronutrient Needs:  · EER:  2273-7740 kcal /day (20-25 kcal/kg actual BW)  · EPR:  81-94 grams protein/day (1.2-1.4 grams/kg IBW) (currently receiving HD)  Intake/Comparative Standards:  Average intake for past 7 day(s)/6 recorded meal(s): 85%. This potentially meets ~100% of kcal and ~90% of protein needs    Nutrition Diagnosis: No nutrition diagnosis at this time    Intervention:   Meals and snacks: Continue current diet. No breads, muffins, cinnamon rolls etc. Double portions. Milk with breakfast daily. Discharge nutrition plan: Will monitor if long term dialysis is needed, if needed recommend renal education. [address], [address] [place] [address],    [phone]

## 2018-04-02 NOTE — PROGRESS NOTES
Progress Note    Patient: Jacobo Forrester MRN: [id number]  SSN: [id number]    YOB: 1985  Age: 26 y.o. Sex: male      Admit Date: 3/26/2018    LOS: 7 days     Subjective:   Mr Marylou Pereira is a 26 yom with a hx of iv drug abuse who was admitted for nausea and vomiting. He Stated symptoms began after injecting heroin. Patient noted to have AISSATOU with Scr of 4.92, oliguric. Despite hydration, his kidney status worsened and he was placed under HD. Work up sent, he tested positive for Hep C, and low complements levels. Has a family history of SLE on his mother. Renal is planning a biopsy this week. Since patient is self-paid, case is difficult.  on board for placement. ROS: all pertinent findings described in my note. Objective:     Vitals:    04/02/18 0710 04/02/18 0715 04/02/18 0729 04/02/18 0755   BP: 149/90 (!) 141/94 (!) 145/94 (!) 152/95   Pulse: (!) 55 (!) 55 60 (!) 55   Resp: 18      Temp:       SpO2:       Weight:            Intake and Output:  Current Shift:    Last three shifts: 03/31 1901 - 04/02 0700  In: 600 [P.O.:600]  Out: 800 [Urine:800]    Physical Exam:   GENERAL: alert, cooperative, no distress, appears stated age  EYE: negative  LYMPHATIC: Cervical, supraclavicular, and axillary nodes normal.   THROAT & NECK: normal and no erythema or exudates noted. RIJ cath in place. LUNG: clear to auscultation bilaterally  HEART: regular rate and rhythm, S1, S2 normal, no murmur, click, rub or gallop  ABDOMEN: soft, but mild tenderness over epigastrium. Bowel sounds normal. No masses,  no organomegaly  EXTREMITIES:  extremities normal, atraumatic, no cyanosis or edema  SKIN: Normal.  NEUROLOGIC: negative- no asterixis   PSYCHIATRIC: non focal    Lab/Data Review: All lab results for the last 24 hours reviewed.      Assessment:     Principal Problem:    ARF (acute renal failure) ([address] (3/26/2018)    Active Problems:    Heroin abuse (3/26/2018)      Tobacco abuse (3/26/2018) Hepatitis (3/27/2018)      Rhabdomyolysis (3/27/2018)      Chronic hepatitis C without hepatic coma ([facility] [address] (3/29/2018)      Plan:     · Avoid nephrotoxic meds  · Monitor electrolytes   · Pain control   · Renal team f/u  · DVT ppx: heparin, GCS      Code Status: full    Care manager involvement for outpatient HD slot    Signed By: Harshal Eastman MD     April 2, 2018

## 2018-04-02 NOTE — PROGRESS NOTES
HD treatment completed without complication. Total of 1.0 kgs removed. Flushed both ports with 10 mL of NS.  VS stable, NAD. CVC dressing clean, dry, and intact, tego caps intact, bilateral lumens wrapped with 4x4 gauze. Transport contacted for return to room.

## 2018-04-02 NOTE — PROGRESS NOTES
Hourly rounds done. Pt c/o headache & nausea, medicated per MAR. Denies vomiting. Choudhury output 200 mL, yellow/straw/clear. No BM this shift. HTN, hydralazine 20 mg IV PRN x1 at 2047. Pt to HD this am. All needs met at this time.

## 2018-04-02 NOTE — PROGRESS NOTES
Admit Date: 3/26/2018      Subjective:          Review of Systems  Cardio-vascular: no chest pain, no SOB  GI: no N/V/D  : no dysuria, no hematuria    Objective:     Patient Vitals for the past 8 hrs:   BP Temp Pulse Resp SpO2 Weight   04/02/18 0824 (!) 171/105 - (!) 54 - - -   04/02/18 0755 (!) 152/95 - (!) 55 - - -   04/02/18 0729 (!) 145/94 - 60 - - -   04/02/18 0715 (!) 141/94 - (!) 55 - - -   04/02/18 0710 149/90 - (!) 55 18 - -   04/02/18 0405 144/86 98.1 °F (36.7 °C) 72 18 95 % 74.5 kg (164 lb 4.8 oz)            Physical Exam:   Lungs: clear  CV: RR, no JVD  Abdomen: soft, not tender, no rebound. Ext: no edema          Data Review   Recent Results (from the past 8 hour(s))   MAGNESIUM    Collection Time: 04/02/18  5:49 AM   Result Value Ref Range    Magnesium 2.1 1.8 - 2.4 mg/dL   HEPATIC FUNCTION PANEL    Collection Time: 04/02/18  5:49 AM   Result Value Ref Range    Protein, total 5.3 (L) 6.3 - 8.2 g/dL    Albumin 2.2 (L) 3.5 - 5.0 g/dL    Globulin 3.1 2.3 - 3.5 g/dL    A-G Ratio 0.7 (L) 1.2 - 3.5      Bilirubin, total 0.4 0.2 - 1.1 MG/DL    Bilirubin, direct 0.2 <0.4 MG/DL    Alk.  phosphatase 47 (L) 50 - 136 U/L    AST (SGOT) 17 15 - 37 U/L    ALT (SGPT) 362 (H) 12 - 65 U/L           Assessment:     Principal Problem:    ARF (acute renal failure) (HCC) (3/26/2018)    Active Problems:    Heroin abuse (3/26/2018)      Tobacco abuse (3/26/2018)      Hepatitis (3/27/2018)      Rhabdomyolysis (3/27/2018)      Chronic hepatitis C without hepatic coma ([facility] [address] (3/29/2018)        Plan:     No renal recovery  Seen on HD  No complication    Bassem Schulz MD

## 2018-04-02 NOTE — PROGRESS NOTES
Spoke with ana dialysis nurse about giving pt hydralazine as ordered PRN for BP-173/100. She assured me that she will.

## 2018-04-03 PROBLEM — K21.9 GERD (GASTROESOPHAGEAL REFLUX DISEASE): Status: ACTIVE | Noted: 2018-04-03

## 2018-04-03 LAB
ALBUMIN SERPL-MCNC: 2.3 G/DL (ref 3.5–5)
ALBUMIN/GLOB SERPL: 0.7 {RATIO} (ref 1.2–3.5)
ALP SERPL-CCNC: 50 U/L (ref 50–136)
ALT SERPL-CCNC: 287 U/L (ref 12–65)
AST SERPL-CCNC: 16 U/L (ref 15–37)
BILIRUB DIRECT SERPL-MCNC: 0.1 MG/DL
BILIRUB SERPL-MCNC: 0.4 MG/DL (ref 0.2–1.1)
GLOBULIN SER CALC-MCNC: 3.4 G/DL (ref 2.3–3.5)
MAGNESIUM SERPL-MCNC: 2.2 MG/DL (ref 1.8–2.4)
PROT SERPL-MCNC: 5.7 G/DL (ref 6.3–8.2)

## 2018-04-03 PROCEDURE — 80076 HEPATIC FUNCTION PANEL: CPT | Performed by: INTERNAL MEDICINE

## 2018-04-03 PROCEDURE — 74011250637 HC RX REV CODE- 250/637: Performed by: INTERNAL MEDICINE

## 2018-04-03 PROCEDURE — 83735 ASSAY OF MAGNESIUM: CPT | Performed by: INTERNAL MEDICINE

## 2018-04-03 PROCEDURE — 36415 COLL VENOUS BLD VENIPUNCTURE: CPT | Performed by: INTERNAL MEDICINE

## 2018-04-03 PROCEDURE — 74011250637 HC RX REV CODE- 250/637: Performed by: FAMILY MEDICINE

## 2018-04-03 PROCEDURE — 65270000029 HC RM PRIVATE

## 2018-04-03 PROCEDURE — 74011250636 HC RX REV CODE- 250/636: Performed by: INTERNAL MEDICINE

## 2018-04-03 RX ORDER — AMLODIPINE BESYLATE 5 MG/1
5 TABLET ORAL DAILY
Status: DISCONTINUED | OUTPATIENT
Start: 2018-04-03 | End: 2018-04-04

## 2018-04-03 RX ADMIN — HEPARIN SODIUM 5000 UNITS: 5000 INJECTION, SOLUTION INTRAVENOUS; SUBCUTANEOUS at 22:48

## 2018-04-03 RX ADMIN — AMLODIPINE BESYLATE 5 MG: 5 TABLET ORAL at 13:01

## 2018-04-03 RX ADMIN — Medication 10 ML: at 01:27

## 2018-04-03 RX ADMIN — ACETAMINOPHEN 650 MG: 500 TABLET, FILM COATED ORAL at 11:27

## 2018-04-03 RX ADMIN — Medication 10 ML: at 06:05

## 2018-04-03 RX ADMIN — HEPARIN SODIUM 5000 UNITS: 5000 INJECTION, SOLUTION INTRAVENOUS; SUBCUTANEOUS at 13:02

## 2018-04-03 RX ADMIN — Medication 10 ML: at 13:01

## 2018-04-03 RX ADMIN — ONDANSETRON 4 MG: 2 INJECTION INTRAMUSCULAR; INTRAVENOUS at 22:48

## 2018-04-03 RX ADMIN — PANTOPRAZOLE SODIUM 40 MG: 40 TABLET, DELAYED RELEASE ORAL at 06:06

## 2018-04-03 RX ADMIN — HEPARIN SODIUM 5000 UNITS: 5000 INJECTION, SOLUTION INTRAVENOUS; SUBCUTANEOUS at 06:06

## 2018-04-03 RX ADMIN — ACETAMINOPHEN 650 MG: 500 TABLET, FILM COATED ORAL at 19:30

## 2018-04-03 RX ADMIN — Medication 10 ML: at 22:48

## 2018-04-03 NOTE — PROGRESS NOTES
Subjective:   Daily Progress Note: 4/3/2018 12:29 PM    No complaints    Current Facility-Administered Medications   Medication Dose Route Frequency    amLODIPine (NORVASC) tablet 5 mg  5 mg Oral DAILY    diphenhydrAMINE (BENADRYL) capsule 25 mg  25 mg Oral QHS PRN    pantoprazole (PROTONIX) tablet 40 mg  40 mg Oral ACB    traMADol (ULTRAM) tablet 50 mg  50 mg Oral Q6H PRN    calcium carbonate (TUMS) chewable tablet 400 mg [elemental]  400 mg Oral TID PRN    hydrALAZINE (APRESOLINE) 20 mg/mL injection 10 mg  10 mg IntraVENous Q6H PRN    sodium chloride (NS) flush 5-10 mL  5-10 mL IntraVENous Q8H    sodium chloride (NS) flush 5-10 mL  5-10 mL IntraVENous PRN    acetaminophen (TYLENOL) tablet 650 mg  650 mg Oral Q4H PRN    ondansetron (ZOFRAN) injection 4 mg  4 mg IntraVENous Q4H PRN    heparin (porcine) injection 5,000 Units  5,000 Units SubCUTAneous Q8H               Objective:     Visit Vitals    BP (!) 155/91    Pulse 62    Temp 98.1 °F (36.7 °C)    Resp 18    Wt 74.4 kg (164 lb)    SpO2 95%    BMI 25.69 kg/m2    O2 Flow Rate (L/min): 2 l/min O2 Device: Room air    Temp (24hrs), Av.6 °F (37 °C), Min:98.1 °F (36.7 °C), Max:99.2 °F (37.3 °C)          1901 -  0700  In: 480 [P.O.:480]  Out: 1450 [Urine:450]      Visit Vitals    BP (!) 155/91    Pulse 62    Temp 98.1 °F (36.7 °C)    Resp 18    Wt 74.4 kg (164 lb)    SpO2 95%    BMI 25.69 kg/m2     Head: Normocephalic, without obvious abnormality  Neck: no JVD  Lungs: clear to auscultation bilaterally  Heart: regular rate and rhythm  Abdomen: soft, non-tender.   Extremities: no edema          Data Review    Recent Results (from the past 48 hour(s))   PLEASE READ & DOCUMENT PPD TEST IN 48 HRS    Collection Time: 18  3:20 PM   Result Value Ref Range    PPD  Negative    mm Induration  0 mm   MAGNESIUM    Collection Time: 18  5:49 AM   Result Value Ref Range    Magnesium 2.1 1.8 - 2.4 mg/dL   HEPATIC FUNCTION PANEL Collection Time: 04/02/18  5:49 AM   Result Value Ref Range    Protein, total 5.3 (L) 6.3 - 8.2 g/dL    Albumin 2.2 (L) 3.5 - 5.0 g/dL    Globulin 3.1 2.3 - 3.5 g/dL    A-G Ratio 0.7 (L) 1.2 - 3.5      Bilirubin, total 0.4 0.2 - 1.1 MG/DL    Bilirubin, direct 0.2 <0.4 MG/DL    Alk. phosphatase 47 (L) 50 - 136 U/L    AST (SGOT) 17 15 - 37 U/L    ALT (SGPT) 362 (H) 12 - 65 U/L   PLEASE READ & DOCUMENT PPD TEST IN 72 HRS    Collection Time: 04/02/18 10:10 AM   Result Value Ref Range    PPD  Negative    mm Induration  0   MAGNESIUM    Collection Time: 04/03/18  6:20 AM   Result Value Ref Range    Magnesium 2.2 1.8 - 2.4 mg/dL   HEPATIC FUNCTION PANEL    Collection Time: 04/03/18  6:20 AM   Result Value Ref Range    Protein, total 5.7 (L) 6.3 - 8.2 g/dL    Albumin 2.3 (L) 3.5 - 5.0 g/dL    Globulin 3.4 2.3 - 3.5 g/dL    A-G Ratio 0.7 (L) 1.2 - 3.5      Bilirubin, total 0.4 0.2 - 1.1 MG/DL    Bilirubin, direct 0.1 <0.4 MG/DL    Alk. phosphatase 50 50 - 136 U/L    AST (SGOT) 16 15 - 37 U/L    ALT (SGPT) 287 (H) 12 - 65 U/L       Assessment     Patient Active Problem List    Diagnosis Date Noted    GERD (gastroesophageal reflux disease) 04/03/2018    Chronic hepatitis C without hepatic coma ([facility] [address] 03/29/2018    Hepatitis 03/27/2018    Rhabdomyolysis 03/27/2018    ARF (acute renal failure) ([facility] [address] 03/26/2018    Heroin abuse 03/26/2018    Tobacco abuse 03/26/2018           Problems Addressed by Nephrology     AISSATOU - GN  Polysubstance Abuse  Hep C    Plan     For biopsy today. Dialysis tomorrow. Check lab tomorrow and follow with increased urine output.

## 2018-04-03 NOTE — MED STUDENT NOTES
Progress Note    Patient: Regla Cat MRN: [id number]  SSN: [id number]    YOB: 1985  Age: 26 y.o. Sex: male      Admit Date: 3/26/2018    LOS: 8 days     Subjective:     Feeling better. No new complaints today. Denies chest pain, sob, leg swelling, nausea, vomiting, diarrhea or abdominal pain. States abdominal discomfort resolved with protonix. Pt had 3rd dose of dialysis yesterday. Objective:     Vitals:    04/02/18 1509 04/02/18 2121 04/03/18 0434 04/03/18 0726   BP: 144/88 (!) 170/95 153/90 138/86   Pulse: 67 (!) 57 70 70   Resp: 18 18 17 18   Temp: 98.5 °F (36.9 °C) 99.2 °F (37.3 °C) 98.3 °F (36.8 °C) 99.1 °F (37.3 °C)   SpO2: 96% 94% 96% 95%   Weight:   74.4 kg (164 lb)         Intake and Output:  Current Shift:    Last three shifts: 04/01 1901 - 04/03 0700  In: 480 [P.O.:480]  Out: 1450 [Urine:450]    Physical Exam:   General appearance: alert, cooperative, no distress, appears stated age  Head: Normocephalic, without obvious abnormality, atraumatic  Eyes: conjunctivae clear. Lungs: clear to auscultation bilaterally  Heart: regular rate and rhythm, no murmur, click, rub or gallop  Abdomen: soft, non-tender. Extremities: extremities normal, atraumatic, no cyanosis or edema  Skin: Skin color, texture, turgor normal. No rashes or lesions    Lab/Data Review: All lab results for the last 24 hours reviewed. Assessment:     Principal Problem:    ARF (acute renal failure) ([facility] (3/26/2018)    Active Problems:    Heroin abuse (3/26/2018)      Tobacco abuse (3/26/2018)      Hepatitis (3/27/2018)      Rhabdomyolysis (3/27/2018)      Chronic hepatitis C without hepatic coma ([facility] (3/29/2018)        Plan:     AISSATOU - presumed glomerular in origin. Last Cr drawn on 4/1 was improved at 9.42 down from 12.20. No labs from yesterday or today to compare with. Order metabolic panel to reassess kidney function s/p 3rd dose of dialysis.      Urine output has improved at 1250 ml yesterday, no longer oliguric. Kidney biopsy planned for today. Signed By: Katia Mckay     April 3, 2018          *ATTENTION:  This note has been created by a medical student for educational purposes only. Please do not refer to the content of this note for clinical decision-making, billing, or other purposes. Please see attending physicians note to obtain clinical information on this patient. *

## 2018-04-03 NOTE — PROGRESS NOTES
Hospitalist Progress Note     Admit Date:  3/26/2018  8:10 AM   Name:  Jacobo Forrester   Age:  26 y.o.  :  1985   MRN:  [id number]   PCP:  None  Treatment Team: Attending Provider: Malick Sandoval MD; Care Manager: Tamar Domínguez RN; Consulting Provider: Erby Lefort, MD    Subjective:   Mr Marylou Pereira is a 26 yom with a hx of iv drug abuse who was admitted for nausea and vomiting. He Stated symptoms began after injecting heroin. Patient noted to have AISSATOU with Scr of 4.92, oliguric. Despite hydration, his kidney status worsened and he was placed under HD. Work up sent, he tested positive for Hep C, and low complements levels. Has a family history of SLE on his mother. Renal is planning a biopsy this week. Since patient is self-paid, case is difficult.  on board for placement. 4/3/18  Says abd pain better after protonix      Objective:   Patient Vitals for the past 24 hrs:   Temp Pulse Resp BP SpO2   18 1059 98.1 °F (36.7 °C) 62 18 (!) 155/91 95 %   18 0726 99.1 °F (37.3 °C) 70 18 138/86 95 %   18 0434 98.3 °F (36.8 °C) 70 17 153/90 96 %   18 2121 99.2 °F (37.3 °C) (!) 57 18 (!) 170/95 94 %   18 1509 98.5 °F (36.9 °C) 67 18 144/88 96 %     Oxygen Therapy  O2 Sat (%): 95 % (18 1059)  Pulse via Oximetry: 75 beats per minute (18 1624)  O2 Device: Room air (18 1624)  O2 Flow Rate (L/min): 2 l/min (18 1505)  ETCO2 (mmHg): 31 mmHg (18 1550)    Intake/Output Summary (Last 24 hours) at 18 1207  Last data filed at 18 1930   Gross per 24 hour   Intake              480 ml   Output              250 ml   Net              230 ml         General:    Well nourished. Alert.    heent- normal  CV:   RRR. No murmur, rub, or gallop. Lungs:   Clear to auscultation bilaterally. No wheezing, rhonchi, or rales. Cns- no focal neurological deficits  Abdomen:   Soft, nontender, nondistended. Extremities: Warm and dry.   No cyanosis or edema.   Skin:     No rashes or jaundice. Data Review:  I have reviewed all labs, meds, telemetry events, and studies from the last 24 hours. Recent Results (from the past 24 hour(s))   MAGNESIUM    Collection Time: 04/03/18  6:20 AM   Result Value Ref Range    Magnesium 2.2 1.8 - 2.4 mg/dL   HEPATIC FUNCTION PANEL    Collection Time: 04/03/18  6:20 AM   Result Value Ref Range    Protein, total 5.7 (L) 6.3 - 8.2 g/dL    Albumin 2.3 (L) 3.5 - 5.0 g/dL    Globulin 3.4 2.3 - 3.5 g/dL    A-G Ratio 0.7 (L) 1.2 - 3.5      Bilirubin, total 0.4 0.2 - 1.1 MG/DL    Bilirubin, direct 0.1 <0.4 MG/DL    Alk. phosphatase 50 50 - 136 U/L    AST (SGOT) 16 15 - 37 U/L    ALT (SGPT) 287 (H) 12 - 65 U/L        All Micro Results     None          Current Meds:  Current Facility-Administered Medications   Medication Dose Route Frequency    diphenhydrAMINE (BENADRYL) capsule 25 mg  25 mg Oral QHS PRN    pantoprazole (PROTONIX) tablet 40 mg  40 mg Oral ACB    traMADol (ULTRAM) tablet 50 mg  50 mg Oral Q6H PRN    calcium carbonate (TUMS) chewable tablet 400 mg [elemental]  400 mg Oral TID PRN    hydrALAZINE (APRESOLINE) 20 mg/mL injection 10 mg  10 mg IntraVENous Q6H PRN    sodium chloride (NS) flush 5-10 mL  5-10 mL IntraVENous Q8H    sodium chloride (NS) flush 5-10 mL  5-10 mL IntraVENous PRN    acetaminophen (TYLENOL) tablet 650 mg  650 mg Oral Q4H PRN    ondansetron (ZOFRAN) injection 4 mg  4 mg IntraVENous Q4H PRN    heparin (porcine) injection 5,000 Units  5,000 Units SubCUTAneous Q8H       Other Studies (last 24 hours):  No results found.     Assessment and Plan:     Hospital Problems as of 4/3/2018  Date Reviewed: 3/29/2018          Codes Class Noted - Resolved POA    Chronic hepatitis C without hepatic coma ([facility] ICD-10-CM: B18.2  ICD-9-CM: 070.54  3/29/2018 - Present Unknown        Hepatitis ICD-10-CM: K75.9  ICD-9-CM: 573.3  3/27/2018 - Present Yes        Rhabdomyolysis ICD-10-CM: M62.82  ICD-9-CM: 728.88 3/27/2018 - Present Yes        * (Principal)ARF (acute renal failure) ([facility] ICD-10-CM: N17.9  ICD-9-CM: 584.9  3/26/2018 - Present Yes        Heroin abuse ICD-10-CM: F11.10  ICD-9-CM: 305.50  3/26/2018 - Present Yes        Tobacco abuse ICD-10-CM: Z72.0  ICD-9-CM: 305.1  3/26/2018 - Present Yes    GERD          PLAN:    AISSATOU- on dialysis- for biopsy today  gerd- on protonix  Hept c  Poly substance abuse  Tobacco abuse  htn- prn hydralazine, will add amlodipine    DC planning/Dispo:    DVT ppx:  heparin    Signed:  Abelino Zavaleta MD

## 2018-04-03 NOTE — INTERDISCIPLINARY ROUNDS
Interdisciplinary Rounds completed 04/03/2018. Nursing, Case Management, and Physician present.     Plan of care reviewed and updated. Awaiting outpatient dialysis slot.

## 2018-04-04 ENCOUNTER — APPOINTMENT (OUTPATIENT)
Dept: CT IMAGING | Age: 33
DRG: 674 | End: 2018-04-04
Attending: INTERNAL MEDICINE
Payer: SELF-PAY

## 2018-04-04 LAB
ALBUMIN SERPL-MCNC: 2.3 G/DL (ref 3.5–5)
ALBUMIN/GLOB SERPL: 0.7 {RATIO} (ref 1.2–3.5)
ALP SERPL-CCNC: 48 U/L (ref 50–136)
ALT SERPL-CCNC: 235 U/L (ref 12–65)
ANION GAP SERPL CALC-SCNC: 11 MMOL/L (ref 7–16)
AST SERPL-CCNC: 16 U/L (ref 15–37)
BILIRUB DIRECT SERPL-MCNC: 0.1 MG/DL
BILIRUB SERPL-MCNC: 0.4 MG/DL (ref 0.2–1.1)
BUN SERPL-MCNC: 53 MG/DL (ref 6–23)
CALCIUM SERPL-MCNC: 8.4 MG/DL (ref 8.3–10.4)
CHLORIDE SERPL-SCNC: 101 MMOL/L (ref 98–107)
CO2 SERPL-SCNC: 26 MMOL/L (ref 21–32)
CREAT SERPL-MCNC: 10.6 MG/DL (ref 0.8–1.5)
GLOBULIN SER CALC-MCNC: 3.4 G/DL (ref 2.3–3.5)
GLUCOSE SERPL-MCNC: 84 MG/DL (ref 65–100)
MAGNESIUM SERPL-MCNC: 2.2 MG/DL (ref 1.8–2.4)
POTASSIUM SERPL-SCNC: 4.9 MMOL/L (ref 3.5–5.1)
PROT SERPL-MCNC: 5.7 G/DL (ref 6.3–8.2)
SODIUM SERPL-SCNC: 138 MMOL/L (ref 136–145)

## 2018-04-04 PROCEDURE — 74011250637 HC RX REV CODE- 250/637: Performed by: INTERNAL MEDICINE

## 2018-04-04 PROCEDURE — 36415 COLL VENOUS BLD VENIPUNCTURE: CPT | Performed by: INTERNAL MEDICINE

## 2018-04-04 PROCEDURE — 65270000029 HC RM PRIVATE

## 2018-04-04 PROCEDURE — 83735 ASSAY OF MAGNESIUM: CPT | Performed by: INTERNAL MEDICINE

## 2018-04-04 PROCEDURE — 80048 BASIC METABOLIC PNL TOTAL CA: CPT | Performed by: INTERNAL MEDICINE

## 2018-04-04 PROCEDURE — 74011250637 HC RX REV CODE- 250/637: Performed by: FAMILY MEDICINE

## 2018-04-04 PROCEDURE — 74011250636 HC RX REV CODE- 250/636: Performed by: INTERNAL MEDICINE

## 2018-04-04 PROCEDURE — 90935 HEMODIALYSIS ONE EVALUATION: CPT

## 2018-04-04 PROCEDURE — 80076 HEPATIC FUNCTION PANEL: CPT | Performed by: INTERNAL MEDICINE

## 2018-04-04 RX ORDER — AMLODIPINE BESYLATE 10 MG/1
10 TABLET ORAL DAILY
Status: DISCONTINUED | OUTPATIENT
Start: 2018-04-04 | End: 2018-04-16 | Stop reason: HOSPADM

## 2018-04-04 RX ADMIN — ACETAMINOPHEN 650 MG: 500 TABLET, FILM COATED ORAL at 05:06

## 2018-04-04 RX ADMIN — AMLODIPINE BESYLATE 10 MG: 10 TABLET ORAL at 11:55

## 2018-04-04 RX ADMIN — Medication 10 ML: at 21:26

## 2018-04-04 RX ADMIN — DIPHENHYDRAMINE HYDROCHLORIDE 25 MG: 25 CAPSULE ORAL at 14:40

## 2018-04-04 RX ADMIN — Medication 10 ML: at 14:38

## 2018-04-04 RX ADMIN — HEPARIN SODIUM 5000 UNITS: 5000 INJECTION, SOLUTION INTRAVENOUS; SUBCUTANEOUS at 14:44

## 2018-04-04 RX ADMIN — HEPARIN SODIUM 5000 UNITS: 5000 INJECTION, SOLUTION INTRAVENOUS; SUBCUTANEOUS at 21:22

## 2018-04-04 RX ADMIN — ACETAMINOPHEN 650 MG: 500 TABLET, FILM COATED ORAL at 11:54

## 2018-04-04 RX ADMIN — PANTOPRAZOLE SODIUM 40 MG: 40 TABLET, DELAYED RELEASE ORAL at 05:06

## 2018-04-04 RX ADMIN — ACETAMINOPHEN 650 MG: 500 TABLET, FILM COATED ORAL at 21:22

## 2018-04-04 NOTE — PROGRESS NOTES
Met with patient to discuss discharge plan. Discussed that at this time patient will continue dialysis and discussed whether or not he had any family and friends whatsoever that he could reach out too in regards to having a place to stay. Patient stated he has no one at this time. Discussed with patient that [address] [place] [address] can accept patient, and patient stated if that's his only choice he will go. Also talked about [facility] facility and that he cannot return there until 4/20/18. Patient stated he is ready to stay clean from drugs and stated he wanted to get his life on track. CM to gather some clothes from [facility] clothes closet tomorrow and provide to patient. PT eval ordered. Patient states MD at some point this week mentioned possible Zoloft d/t feeling down. Notified MD of this. Provided patient with [employer] application. Patient to fill out. CM will continue to follow for discharge needs.

## 2018-04-04 NOTE — PROGRESS NOTES
HD treatment completed without complication. Total of 0.5 kgs removed. Flushed both ports with 10 mL of NS.  VS stable, NAD. CVC dressing clean, dry, and intact, tego caps intact, bilateral lumens wrapped with 4x4 gauze. Transport contacted for return to room.

## 2018-04-04 NOTE — PROGRESS NOTES
Hemodialysis treatment initiated using right perm catheter by TIARRA Huggins RN. Aspirated and flushed both ports without problem. Dressing clean, dry and intact. Pt alert and VS stable. Machine settings per MD order. Will monitor during treatment.

## 2018-04-04 NOTE — PROGRESS NOTES
Admit Date: 3/26/2018      Subjective:          Review of Systems  Cardio-vascular: no chest pain, no SOB  GI: no N/V/D  : no dysuria, no hematuria    Objective:     Patient Vitals for the past 8 hrs:   BP Temp Pulse Resp SpO2   04/04/18 0856 (!) 172/100 - 67 - -   04/04/18 0828 (!) 156/97 - (!) 56 - -   04/04/18 0758 (!) 153/97 - (!) 57 - -   04/04/18 0732 (!) 156/94 - 60 - -   04/04/18 0709 (!) 155/91 - 60 - -   04/04/18 0626 148/85 - - - -   04/04/18 0458 (!) 161/93 98 °F (36.7 °C) 67 18 93 %            Physical Exam:   Lungs: clear  CV: RR, no JVD  Abdomen: soft, not tender, no rebound. Ext: no edema          Data Review   Recent Results (from the past 8 hour(s))   MAGNESIUM    Collection Time: 04/04/18  5:31 AM   Result Value Ref Range    Magnesium 2.2 1.8 - 2.4 mg/dL   HEPATIC FUNCTION PANEL    Collection Time: 04/04/18  5:31 AM   Result Value Ref Range    Protein, total 5.7 (L) 6.3 - 8.2 g/dL    Albumin 2.3 (L) 3.5 - 5.0 g/dL    Globulin 3.4 2.3 - 3.5 g/dL    A-G Ratio 0.7 (L) 1.2 - 3.5      Bilirubin, total 0.4 0.2 - 1.1 MG/DL    Bilirubin, direct 0.1 <0.4 MG/DL    Alk.  phosphatase 48 (L) 50 - 136 U/L    AST (SGOT) 16 15 - 37 U/L    ALT (SGPT) 235 (H) 12 - 65 U/L   METABOLIC PANEL, BASIC    Collection Time: 04/04/18  5:31 AM   Result Value Ref Range    Sodium 138 136 - 145 mmol/L    Potassium 4.9 3.5 - 5.1 mmol/L    Chloride 101 98 - 107 mmol/L    CO2 26 21 - 32 mmol/L    Anion gap 11 7 - 16 mmol/L    Glucose 84 65 - 100 mg/dL    BUN 53 (H) 6 - 23 MG/DL    Creatinine 10.60 (H) 0.8 - 1.5 MG/DL    GFR est AA 7 (L) >60 ml/min/1.73m2    GFR est non-AA 6 (L) >60 ml/min/1.73m2    Calcium 8.4 8.3 - 10.4 MG/DL           Assessment:     Principal Problem:    ARF (acute renal failure) ([facility]) (3/26/2018)    Active Problems:    Heroin abuse (3/26/2018)      Tobacco abuse (3/26/2018)      Hepatitis (3/27/2018)      Rhabdomyolysis (3/27/2018)      Chronic hepatitis C without hepatic coma ([facility] (3/29/2018)      GERD (gastroesophageal reflux disease) (4/3/2018)        Plan:     No renal recovery.   Seen on HD  No complication    Jordyn Garcia MD

## 2018-04-04 NOTE — PROGRESS NOTES
Call to patient's friend Annita Eckertmelvi to request he bring patient's belongings to the hospital. Left a message and will follow up. Annita Whelan [phone]. Patient with flat affect and depression. Support. Danielito Matos

## 2018-04-04 NOTE — PROGRESS NOTES
Call placed to [facility], and she stated she received all paperwork and will call CM back once she follows up with clinic.

## 2018-04-04 NOTE — PROGRESS NOTES
Hospitalist Progress Note     Admit Date:  3/26/2018  8:10 AM   Name:  Shan Escoto   Age:  26 y.o.  :  1985   MRN:  [id number]   PCP:  None  Treatment Team: Attending Provider: Archer Brittle, MD; Care Manager: Tommy Rome RN; Consulting Provider: Barbi Perez MD    Subjective:   Mr Jenny Mcnair is a 26 yom with a hx of iv drug abuse who was admitted for nausea and vomiting. He Stated symptoms began after injecting heroin. Patient noted to have AISSATOU with Scr of 4.92, oliguric. Despite hydration, his kidney status worsened and he was placed under HD. Work up sent, he tested positive for Hep C, and low complements levels. Has a family history of SLE on his mother. Renal is planning a biopsy this week. Since patient is self-paid, case is difficult.  on board for placement.      4/3/18  Says abd pain better after protonix    18  Saw pt in dialysis- no complaints      Objective:     Patient Vitals for the past 24 hrs:   Temp Pulse Resp BP SpO2   18 1129 98.2 °F (36.8 °C) 71 18 (!) 143/93 93 %   18 1000 - 62 - (!) 156/94 -   18 0926 - 63 - (!) 154/96 -   18 0856 - 67 - (!) 172/100 -   18 0828 - (!) 56 - (!) 156/97 -   18 0758 - (!) 57 - (!) 153/97 -   18 0732 - 60 - (!) 156/94 -   18 0709 - 60 - (!) 155/91 -   18 0626 - - - 148/85 -   18 0458 98 °F (36.7 °C) 67 18 (!) 161/93 93 %   18 2332 98.7 °F (37.1 °C) 67 18 154/86 95 %   18 2247 - - - 142/86 -   18 2143 99 °F (37.2 °C) 64 18 (!) 161/92 94 %   18 1621 98.1 °F (36.7 °C) 67 18 154/90 95 %     Oxygen Therapy  O2 Sat (%): 93 % (18 1129)  Pulse via Oximetry: 75 beats per minute (18 1624)  O2 Device: Room air (18 1129)  O2 Flow Rate (L/min): 2 l/min (18 1505)  ETCO2 (mmHg): 31 mmHg (18 1550)    Intake/Output Summary (Last 24 hours) at 18 1145  Last data filed at 18 1000   Gross per 24 hour   Intake 600 ml   Output              500 ml   Net              100 ml         General:    Well nourished. Alert.    heent- normal  CV:   RRR. No murmur, rub, or gallop. Lungs:   Clear to auscultation bilaterally. No wheezing, rhonchi, or rales. Cns- no focal neurological deficits  Abdomen:   Soft, nontender, nondistended. Extremities: Warm and dry. No cyanosis or edema. Skin:     No rashes or jaundice. Data Review:  I have reviewed all labs, meds, telemetry events, and studies from the last 24 hours. Recent Results (from the past 24 hour(s))   MAGNESIUM    Collection Time: 04/04/18  5:31 AM   Result Value Ref Range    Magnesium 2.2 1.8 - 2.4 mg/dL   HEPATIC FUNCTION PANEL    Collection Time: 04/04/18  5:31 AM   Result Value Ref Range    Protein, total 5.7 (L) 6.3 - 8.2 g/dL    Albumin 2.3 (L) 3.5 - 5.0 g/dL    Globulin 3.4 2.3 - 3.5 g/dL    A-G Ratio 0.7 (L) 1.2 - 3.5      Bilirubin, total 0.4 0.2 - 1.1 MG/DL    Bilirubin, direct 0.1 <0.4 MG/DL    Alk.  phosphatase 48 (L) 50 - 136 U/L    AST (SGOT) 16 15 - 37 U/L    ALT (SGPT) 235 (H) 12 - 65 U/L   METABOLIC PANEL, BASIC    Collection Time: 04/04/18  5:31 AM   Result Value Ref Range    Sodium 138 136 - 145 mmol/L    Potassium 4.9 3.5 - 5.1 mmol/L    Chloride 101 98 - 107 mmol/L    CO2 26 21 - 32 mmol/L    Anion gap 11 7 - 16 mmol/L    Glucose 84 65 - 100 mg/dL    BUN 53 (H) 6 - 23 MG/DL    Creatinine 10.60 (H) 0.8 - 1.5 MG/DL    GFR est AA 7 (L) >60 ml/min/1.73m2    GFR est non-AA 6 (L) >60 ml/min/1.73m2    Calcium 8.4 8.3 - 10.4 MG/DL        All Micro Results     None          Current Meds:  Current Facility-Administered Medications   Medication Dose Route Frequency    [START ON 4/5/2018] amLODIPine (NORVASC) tablet 10 mg  10 mg Oral DAILY    diphenhydrAMINE (BENADRYL) capsule 25 mg  25 mg Oral QHS PRN    pantoprazole (PROTONIX) tablet 40 mg  40 mg Oral ACB    traMADol (ULTRAM) tablet 50 mg  50 mg Oral Q6H PRN    calcium carbonate (TUMS) chewable tablet 400 mg [elemental]  400 mg Oral TID PRN    hydrALAZINE (APRESOLINE) 20 mg/mL injection 10 mg  10 mg IntraVENous Q6H PRN    sodium chloride (NS) flush 5-10 mL  5-10 mL IntraVENous Q8H    sodium chloride (NS) flush 5-10 mL  5-10 mL IntraVENous PRN    acetaminophen (TYLENOL) tablet 650 mg  650 mg Oral Q4H PRN    ondansetron (ZOFRAN) injection 4 mg  4 mg IntraVENous Q4H PRN    heparin (porcine) injection 5,000 Units  5,000 Units SubCUTAneous Q8H       Other Studies (last 24 hours):  No results found.     Assessment and Plan:     Hospital Problems as of 4/3/2018  Date Reviewed: 3/29/2018          Codes Class Noted - Resolved POA    Chronic hepatitis C without hepatic coma ([facility] ICD-10-CM: B18.2  ICD-9-CM: 070.54  3/29/2018 - Present Unknown        Hepatitis ICD-10-CM: K75.9  ICD-9-CM: 573.3  3/27/2018 - Present Yes        Rhabdomyolysis ICD-10-CM: M62.82  ICD-9-CM: 728.88  3/27/2018 - Present Yes        * (Principal)ARF (acute renal failure) ([facility] ICD-10-CM: N17.9  ICD-9-CM: 584.9  3/26/2018 - Present Yes        Heroin abuse ICD-10-CM: F11.10  ICD-9-CM: 305.50  3/26/2018 - Present Yes        Tobacco abuse ICD-10-CM: Z72.0  ICD-9-CM: 305.1  3/26/2018 - Present Yes    GERD          PLAN:    AISSATOU- on dialysis- for biopsy on 4/5/18  gerd- on protonix  Hept c  Poly substance abuse  Tobacco abuse  htn- prn hydralazine, increase amlodipine to 10 mg    DC planning/Dispo:    DVT ppx:  heparin    Signed:  Heath Gaming MD

## 2018-04-04 NOTE — PROGRESS NOTES
During dressing change, this nurse was called in to observe red marks forming around pt's HD insertion site and trailing down to abdomen. Pt has benadryl ordered. Will administer benadryl once sterile dressing change is completed.

## 2018-04-04 NOTE — PROGRESS NOTES
Pt c/o headache. Tylenol 650mg PO adminstered along with norvasc 10mg. /93. Will continue to monitor. Pt's headache less painful. BP now 138/90.

## 2018-04-05 ENCOUNTER — APPOINTMENT (OUTPATIENT)
Dept: CT IMAGING | Age: 33
DRG: 674 | End: 2018-04-05
Attending: INTERNAL MEDICINE
Payer: SELF-PAY

## 2018-04-05 LAB
ALBUMIN SERPL-MCNC: 2.2 G/DL (ref 3.5–5)
ALBUMIN/GLOB SERPL: 0.6 {RATIO} (ref 1.2–3.5)
ALP SERPL-CCNC: 52 U/L (ref 50–136)
ALT SERPL-CCNC: 180 U/L (ref 12–65)
ANION GAP SERPL CALC-SCNC: 10 MMOL/L (ref 7–16)
AST SERPL-CCNC: 19 U/L (ref 15–37)
BILIRUB DIRECT SERPL-MCNC: <0.1 MG/DL
BILIRUB SERPL-MCNC: 0.3 MG/DL (ref 0.2–1.1)
BUN SERPL-MCNC: 36 MG/DL (ref 6–23)
CALCIUM SERPL-MCNC: 8.6 MG/DL (ref 8.3–10.4)
CHLORIDE SERPL-SCNC: 102 MMOL/L (ref 98–107)
CO2 SERPL-SCNC: 27 MMOL/L (ref 21–32)
CREAT SERPL-MCNC: 8.23 MG/DL (ref 0.8–1.5)
GLOBULIN SER CALC-MCNC: 3.5 G/DL (ref 2.3–3.5)
GLUCOSE SERPL-MCNC: 82 MG/DL (ref 65–100)
MAGNESIUM SERPL-MCNC: 2.2 MG/DL (ref 1.8–2.4)
POTASSIUM SERPL-SCNC: 4.8 MMOL/L (ref 3.5–5.1)
PROT SERPL-MCNC: 5.7 G/DL (ref 6.3–8.2)
SODIUM SERPL-SCNC: 139 MMOL/L (ref 136–145)

## 2018-04-05 PROCEDURE — 80048 BASIC METABOLIC PNL TOTAL CA: CPT | Performed by: INTERNAL MEDICINE

## 2018-04-05 PROCEDURE — 36415 COLL VENOUS BLD VENIPUNCTURE: CPT | Performed by: INTERNAL MEDICINE

## 2018-04-05 PROCEDURE — 74011250637 HC RX REV CODE- 250/637: Performed by: INTERNAL MEDICINE

## 2018-04-05 PROCEDURE — 83735 ASSAY OF MAGNESIUM: CPT | Performed by: INTERNAL MEDICINE

## 2018-04-05 PROCEDURE — 80076 HEPATIC FUNCTION PANEL: CPT | Performed by: INTERNAL MEDICINE

## 2018-04-05 PROCEDURE — 74011250636 HC RX REV CODE- 250/636: Performed by: INTERNAL MEDICINE

## 2018-04-05 PROCEDURE — 97161 PT EVAL LOW COMPLEX 20 MIN: CPT

## 2018-04-05 PROCEDURE — 74011250637 HC RX REV CODE- 250/637: Performed by: FAMILY MEDICINE

## 2018-04-05 PROCEDURE — 65270000029 HC RM PRIVATE

## 2018-04-05 RX ORDER — SERTRALINE HYDROCHLORIDE 50 MG/1
25 TABLET, FILM COATED ORAL DAILY
Status: DISCONTINUED | OUTPATIENT
Start: 2018-04-05 | End: 2018-04-16 | Stop reason: HOSPADM

## 2018-04-05 RX ADMIN — HEPARIN SODIUM 5000 UNITS: 5000 INJECTION, SOLUTION INTRAVENOUS; SUBCUTANEOUS at 22:17

## 2018-04-05 RX ADMIN — SERTRALINE HYDROCHLORIDE 25 MG: 50 TABLET ORAL at 11:04

## 2018-04-05 RX ADMIN — PANTOPRAZOLE SODIUM 40 MG: 40 TABLET, DELAYED RELEASE ORAL at 05:06

## 2018-04-05 RX ADMIN — Medication 10 ML: at 05:07

## 2018-04-05 RX ADMIN — Medication 10 ML: at 22:19

## 2018-04-05 RX ADMIN — ACETAMINOPHEN 650 MG: 500 TABLET, FILM COATED ORAL at 22:17

## 2018-04-05 RX ADMIN — ACETAMINOPHEN 1000 MG: 500 TABLET, FILM COATED ORAL at 10:14

## 2018-04-05 RX ADMIN — Medication 10 ML: at 15:36

## 2018-04-05 RX ADMIN — AMLODIPINE BESYLATE 10 MG: 10 TABLET ORAL at 11:04

## 2018-04-05 RX ADMIN — HEPARIN SODIUM 5000 UNITS: 5000 INJECTION, SOLUTION INTRAVENOUS; SUBCUTANEOUS at 14:00

## 2018-04-05 NOTE — PROGRESS NOTES
Subjective:   Daily Progress Note: 2018 12:38 PM    No complaints    Current Facility-Administered Medications   Medication Dose Route Frequency    sertraline (ZOLOFT) tablet 25 mg  25 mg Oral DAILY    amLODIPine (NORVASC) tablet 10 mg  10 mg Oral DAILY    diphenhydrAMINE (BENADRYL) capsule 25 mg  25 mg Oral QHS PRN    pantoprazole (PROTONIX) tablet 40 mg  40 mg Oral ACB    traMADol (ULTRAM) tablet 50 mg  50 mg Oral Q6H PRN    calcium carbonate (TUMS) chewable tablet 400 mg [elemental]  400 mg Oral TID PRN    hydrALAZINE (APRESOLINE) 20 mg/mL injection 10 mg  10 mg IntraVENous Q6H PRN    sodium chloride (NS) flush 5-10 mL  5-10 mL IntraVENous Q8H    sodium chloride (NS) flush 5-10 mL  5-10 mL IntraVENous PRN    acetaminophen (TYLENOL) tablet 650 mg  650 mg Oral Q4H PRN    ondansetron (ZOFRAN) injection 4 mg  4 mg IntraVENous Q4H PRN    heparin (porcine) injection 5,000 Units  5,000 Units SubCUTAneous Q8H               Objective:     Visit Vitals    BP (!) 151/93 (BP 1 Location: Left arm, BP Patient Position: At rest)    Pulse 70    Temp 97.7 °F (36.5 °C)    Resp 17    Ht 5' 7\" (1.702 m)    Wt 76.7 kg (169 lb)    SpO2 98%    BMI 26.47 kg/m2    O2 Flow Rate (L/min): 2 l/min O2 Device: Room air    Temp (24hrs), Av.1 °F (36.7 °C), Min:97.7 °F (36.5 °C), Max:98.4 °F (36.9 °C)          1901 -  0700  In: -   Out: 500       Visit Vitals    BP (!) 151/93 (BP 1 Location: Left arm, BP Patient Position: At rest)    Pulse 70    Temp 97.7 °F (36.5 °C)    Resp 17    Ht 5' 7\" (1.702 m)    Wt 76.7 kg (169 lb)    SpO2 98%    BMI 26.47 kg/m2     Head: Normocephalic, without obvious abnormality  Neck: no JVD  Lungs: clear to auscultation bilaterally  Heart: regular rate and rhythm  Abdomen: soft, non-tender.   Extremities: no edema          Data Review    Recent Results (from the past 48 hour(s))   MAGNESIUM    Collection Time: 18  5:31 AM   Result Value Ref Range    Magnesium 2.2 1.8 - 2.4 mg/dL   HEPATIC FUNCTION PANEL    Collection Time: 04/04/18  5:31 AM   Result Value Ref Range    Protein, total 5.7 (L) 6.3 - 8.2 g/dL    Albumin 2.3 (L) 3.5 - 5.0 g/dL    Globulin 3.4 2.3 - 3.5 g/dL    A-G Ratio 0.7 (L) 1.2 - 3.5      Bilirubin, total 0.4 0.2 - 1.1 MG/DL    Bilirubin, direct 0.1 <0.4 MG/DL    Alk. phosphatase 48 (L) 50 - 136 U/L    AST (SGOT) 16 15 - 37 U/L    ALT (SGPT) 235 (H) 12 - 65 U/L   METABOLIC PANEL, BASIC    Collection Time: 04/04/18  5:31 AM   Result Value Ref Range    Sodium 138 136 - 145 mmol/L    Potassium 4.9 3.5 - 5.1 mmol/L    Chloride 101 98 - 107 mmol/L    CO2 26 21 - 32 mmol/L    Anion gap 11 7 - 16 mmol/L    Glucose 84 65 - 100 mg/dL    BUN 53 (H) 6 - 23 MG/DL    Creatinine 10.60 (H) 0.8 - 1.5 MG/DL    GFR est AA 7 (L) >60 ml/min/1.73m2    GFR est non-AA 6 (L) >60 ml/min/1.73m2    Calcium 8.4 8.3 - 10.4 MG/DL   MAGNESIUM    Collection Time: 04/05/18  5:51 AM   Result Value Ref Range    Magnesium 2.2 1.8 - 2.4 mg/dL   HEPATIC FUNCTION PANEL    Collection Time: 04/05/18  5:51 AM   Result Value Ref Range    Protein, total 5.7 (L) 6.3 - 8.2 g/dL    Albumin 2.2 (L) 3.5 - 5.0 g/dL    Globulin 3.5 2.3 - 3.5 g/dL    A-G Ratio 0.6 (L) 1.2 - 3.5      Bilirubin, total 0.3 0.2 - 1.1 MG/DL    Bilirubin, direct <0.1 <0.4 MG/DL    Alk.  phosphatase 52 50 - 136 U/L    AST (SGOT) 19 15 - 37 U/L    ALT (SGPT) 180 (H) 12 - 65 U/L   METABOLIC PANEL, BASIC    Collection Time: 04/05/18  5:51 AM   Result Value Ref Range    Sodium 139 136 - 145 mmol/L    Potassium 4.8 3.5 - 5.1 mmol/L    Chloride 102 98 - 107 mmol/L    CO2 27 21 - 32 mmol/L    Anion gap 10 7 - 16 mmol/L    Glucose 82 65 - 100 mg/dL    BUN 36 (H) 6 - 23 MG/DL    Creatinine 8.23 (H) 0.8 - 1.5 MG/DL    GFR est AA 10 (L) >60 ml/min/1.73m2    GFR est non-AA 8 (L) >60 ml/min/1.73m2    Calcium 8.6 8.3 - 10.4 MG/DL       Assessment     Patient Active Problem List    Diagnosis Date Noted    GERD (gastroesophageal reflux disease) 04/03/2018    Chronic hepatitis C without hepatic coma ([facility] 03/29/2018    Hepatitis 03/27/2018    Rhabdomyolysis 03/27/2018    ARF (acute renal failure) ([facility] 03/26/2018    Heroin abuse 03/26/2018    Tobacco abuse 03/26/2018           Problems Addressed by Nephrology     AISSATOU - unspecified    Plan     Renal biopsy scheduled for tomorrow morning. Will arrange dialysis second shift. Will also arrange for outpatient dialysis - [place]. This may take some time given funding status. CBC tomorrow.

## 2018-04-05 NOTE — PROGRESS NOTES
Call placed to Kaushik Ca at [facility], and she stated she is waiting to hear about dialysis slot and contract needed.

## 2018-04-05 NOTE — MED STUDENT NOTES
Progress Note    Patient: Javier Fry MRN: [id number]  SSN: [id number]    YOB: 1985  Age: 26 y.o. Sex: male      Admit Date: 3/26/2018    LOS: 10 days     Subjective:     No new complaints today. Patient went down to IR for kidney bx but was told he would not be able to have it done until at least tomorrow since they are busy. Objective:     Vitals:    04/04/18 2325 04/05/18 0340 04/05/18 0730 04/05/18 0807   BP: 148/90 147/84 (!) 146/91 (!) 141/94   Pulse: 78 70 69 68   Resp: 18 18 18 16   Temp: 98.4 °F (36.9 °C) 97.9 °F (36.6 °C) 98.4 °F (36.9 °C) 98.1 °F (36.7 °C)   SpO2: 94% 94% 95% 93%   Weight:  76.7 kg (169 lb)  76.7 kg (169 lb)   Height:    5' 7\" (1.702 m)        Intake and Output:  Current Shift:    Last three shifts: 04/03 1901 - 04/05 0700  In: -   Out: 500     Physical Exam:   General appearance: alert, cooperative, no distress, appears stated age  Head: Normocephalic, without obvious abnormality, atraumatic  Eyes: conjunctivae clear. Lungs: clear to auscultation bilaterally  Heart: regular rate and rhythm, no murmur, click, rub or gallop  Abdomen: soft, non-tender. Extremities: extremities normal, atraumatic, no cyanosis or edema  Skin: Skin color, texture, turgor normal. No rashes or lesions    Lab/Data Review: All lab results for the last 24 hours reviewed. Assessment:     Principal Problem:    ARF (acute renal failure) ([facility] (3/26/2018)    Active Problems:    Heroin abuse (3/26/2018)      Tobacco abuse (3/26/2018)      Hepatitis (3/27/2018)      Rhabdomyolysis (3/27/2018)      Chronic hepatitis C without hepatic coma ([address] (3/29/2018)      GERD (gastroesophageal reflux disease) (4/3/2018)        Plan:     AISSATOU - presumed glomerular in origin. HD yesterday. Cr 8.23 today from 10.6 yesterday. Kidney biopsy tomorrow? Oliguric, with 500 output yesterday. Encouraged to drink more. Consider restarting fluids.                             Signed By: Rigo Tidwell April 5, 2018          *ATTENTION:  This note has been created by a medical student for educational purposes only. Please do not refer to the content of this note for clinical decision-making, billing, or other purposes. Please see attending physicians note to obtain clinical information on this patient. *

## 2018-04-05 NOTE — PROGRESS NOTES
Provided patient with some clothing from [facility]. [employer] Patient very appreciative.

## 2018-04-05 NOTE — PROGRESS NOTES
PT Note:  PT orders received/reviewed and evaluation attempted. Patient was off the floor. Evaluation will be re-attempted as schedule and patient's status allow.   Thanks,  Cesar Rawls, PT, DPT

## 2018-04-05 NOTE — PROGRESS NOTES
Interdisciplinary Rounds completed 04/05/2018. Nursing, Case Management, Physician and PT present. Plan of care reviewed and updated. MD requests that staff ambulate with pt in the leija daily.   Pt did not qualify for PT.

## 2018-04-05 NOTE — PROGRESS NOTES
Problem: Mobility Impaired (Adult and Pediatric)  Goal: *Acute Goals and Plan of Care (Insert Text)    PHYSICAL THERAPY: Initial Assessment, Discharge, AM 4/5/2018  INPATIENT: Hospital Day: 11  Payor: SELF PAY / Plan: [facility] SELF PAY / Product Type: Self Pay /      NAME/AGE/GENDER: Carmela Hussein is a 26 y.o. male   PRIMARY DIAGNOSIS: ARF (acute renal failure) ([address] ARF (acute renal failure) ([address] ARF (acute renal failure) ([address]        ICD-10: Treatment Diagnosis:    · Other abnormalities of gait and mobility (R26.89)   Precaution/Allergies:  Pcn [penicillins]      ASSESSMENT:     Mr. Jaylen Pro is a 26 y.o. male in the hospital for the above who is homeless with little to no outside support and history of IV drug use. He reported that he is independent with ADLs and ambulation but doesn't walk as much as he used to. He presents to PT with  [facility] strength and decreased R shoulder flexion ROM due to HD catheter. Pt performed bed mobility, transfers, and ambulation with independence. He was encouraged to take walks in leija with RN approval for daily activity. Pt appears to be functioning at his baseline and will be discharged from PT at this time. 1.         RECOMMENDED REHABILITATION/EQUIPMENT: (at time of discharge pending progress): Due to the probability of continued deficits (see above) this patient will not likely need continued skilled physical therapy after discharge. Equipment:    None at this time              HISTORY:   History of Present Injury/Illness (Reason for Referral):  Per H&P: \"  HPI:   Patient is a 26 yom with a hx of iv drug abuse who presents with 2 day hx of malaise associated with nausea. States symptoms began after injecting heroin. Denies any fevers, cp, sob. Notes anxiety. In ER pt noted to have ARF with Scr of 4.92. Pt has not made urine in 2 days per pt. Cbc, UA pending     Complete ROS done and is as stated in HPI or otherwise negative  History reviewed.  No pertinent past medical history. \"  Past Medical History/Comorbidities:   Mr. Sav Salguero  has no past medical history on file. Mr. Sav Salguero  has a past surgical history that includes hx tonsil and adenoidectomy. Social History/Living Environment:   Home Environment: Apartment  One/Two Story Residence: One story  Living Alone: No  Support Systems: Family member(s)  Patient Expects to be Discharged to[de-identified] Apartment  Current DME Used/Available at Home: None  Prior Level of Function/Work/Activity:  Pt is homeless but has indicated he was working on getting an apartment. Independent with ADLs and ambulation at baseline. Number of Personal Factors/Comorbidities that affect the Plan of Care:  Drug use 1-2: MODERATE COMPLEXITY   EXAMINATION:   Most Recent Physical Functioning:   Gross Assessment:  AROM: Generally decreased, functional (R UE due to HD catheter)  Strength: Within functional limits               Posture:     Balance:  Sitting: Intact  Standing: Intact Bed Mobility:  Supine to Sit: Independent  Wheelchair Mobility:     Transfers:  Sit to Stand: Independent  Stand to Sit: Independent  Bed to Chair: Independent  Gait:            Body Structures Involved:  1. Endocrine Body Functions Affected:  1. Metobolic/Endocrine Activities and Participation Affected:  1. None   Number of elements that affect the Plan of Care: 1-2: LOW COMPLEXITY   CLINICAL PRESENTATION:   Presentation: Stable and uncomplicated: LOW COMPLEXITY   CLINICAL DECISION MAKIN [facility] [address] [address] AM-PAC 6 Clicks   Basic Mobility Inpatient Short Form  How much difficulty does the patient currently have. .. Unable A Lot A Little None   1. Turning over in bed (including adjusting bedclothes, sheets and blankets)? [] 1   [] 2   [] 3   [x] 4   2. Sitting down on and standing up from a chair with arms ( e.g., wheelchair, bedside commode, etc.)   [] 1   [] 2   [] 3   [x] 4   3. Moving from lying on back to sitting on the side of the bed?    [] 1   [] 2   [] 3   [x] 4 How much help from another person does the patient currently need. .. Total A Lot A Little None   4. Moving to and from a bed to a chair (including a wheelchair)? [] 1   [] 2   [] 3   [x] 4   5. Need to walk in hospital room? [] 1   [] 2   [] 3   [x] 4   6. Climbing 3-5 steps with a railing? [] 1   [] 2   [] 3   [x] 4   © 2007, Trustees of [address] [place] [address], under license to [employer]. All rights reserved      Score:  Initial: 24 Most Recent: X (Date: -- )    Interpretation of Tool:  Represents activities that are increasingly more difficult (i.e. Bed mobility, Transfers, Gait). Score 24 23 22-20 19-15 14-10 9-7 6     Modifier CH CI CJ CK CL CM CN      ? Mobility - Walking and Moving Around:     - CURRENT STATUS: CH - 0% impaired, limited or restricted    - GOAL STATUS: CH - 0% impaired, limited or restricted    - D/C STATUS:  CH - 0% impaired, limited or restricted  Payor: SELF PAY / Plan: [facility] SELF PAY / Product Type: Self Pay /         Use of outcome tool(s) and clinical judgement create a POC that gives a: Clear prediction of patient's progress: LOW COMPLEXITY            TREATMENT:   (In addition to Assessment/Re-Assessment sessions the following treatments were rendered)   Pre-treatment Symptoms/Complaints:  None  Pain: Initial:   Pain Intensity 1: 0  Post Session:  0     Assessment/Reassessment only, no treatment provided today    Braces/Orthotics/Lines/Etc:   · O2 Device: Room air  Treatment/Session Assessment:    · Response to Treatment:  Tolerated well with no complaints. · Interdisciplinary Collaboration:   o Physical Therapist  o Registered Nurse  o   · After treatment position/precautions:   o Up in chair  o Call light within reach   · Recommendations/Intent for next treatment session:  Will discharge from PT as pt appears to be functioning close to baseline.   Total Treatment Duration:  PT Patient Time In/Time Out  Time In: 1102  Time Out: [address] [place] [employer], DPT

## 2018-04-05 NOTE — PROGRESS NOTES
Hourly rounds done. Pt c/o headache, medicated per MAR. Denies nausea, vomiting. Voiding this shift, no BM. All needs met at this time.

## 2018-04-05 NOTE — PROGRESS NOTES
Hospitalist Progress Note     Admit Date:  3/26/2018  8:10 AM   Name:  Narda Caceres   Age:  26 y.o.  :  1985   MRN:  [id number]   PCP:  None  Treatment Team: Attending Provider: Marshal Madrigal MD; Care Manager: Joanna Hansen, RN; Consulting Provider: Valentino Fajardo MD; Charge Nurse: Queen Lee RN    Subjective:   Mr Divya Osuna is a 26 yom with a hx of iv drug abuse who was admitted for nausea and vomiting. He Stated symptoms began after injecting heroin. Patient noted to have AISSATOU with Scr of 4.92, oliguric. Despite hydration, his kidney status worsened and he was placed under HD. Work up sent, he tested positive for Hep C, and low complements levels. Has a family history of SLE on his mother. Renal is planning a biopsy this week. Since patient is self-paid, case is difficult.  on board for placement. 4/3/18  Says abd pain better after protonix    18  Saw pt in dialysis- no complaints    18  Went for biopsy today- but rescheduled to tomorrow  No other complaints  Staff say pt calm and anxious at times--? Depressed- when asked pt says not depressed but was ok to start medications       Objective:     Patient Vitals for the past 24 hrs:   Temp Pulse Resp BP SpO2   18 1424 97.8 °F (36.6 °C) 73 17 117/79 94 %   18 1213 97.7 °F (36.5 °C) 70 17 (!) 151/93 98 %   18 0807 98.1 °F (36.7 °C) 68 16 (!) 141/94 93 %   18 0730 98.4 °F (36.9 °C) 69 18 (!) 146/91 95 %   18 0340 97.9 °F (36.6 °C) 70 18 147/84 94 %   18 2325 98.4 °F (36.9 °C) 78 18 148/90 94 %   18 1907 98.1 °F (36.7 °C) 82 18 (!) 144/93 93 %     Oxygen Therapy  O2 Sat (%): 94 % (18 1424)  Pulse via Oximetry: 75 beats per minute (18 1624)  O2 Device: Room air (18 1424)  O2 Flow Rate (L/min): 2 l/min (18 1505)  ETCO2 (mmHg): 31 mmHg (18 1550)  No intake or output data in the 24 hours ending 18 1425      General:    Well nourished. Alert. heent- normal  CV:   RRR. No murmur, rub, or gallop. Lungs:   Clear to auscultation bilaterally. No wheezing, rhonchi, or rales. Temporary dialysis cath rt chest  Cns- no focal neurological deficits  Abdomen:   Soft, nontender, nondistended. Extremities: Warm and dry. No cyanosis or edema. Skin:     No rashes or jaundice. Data Review:  I have reviewed all labs, meds, telemetry events, and studies from the last 24 hours. Recent Results (from the past 24 hour(s))   MAGNESIUM    Collection Time: 04/05/18  5:51 AM   Result Value Ref Range    Magnesium 2.2 1.8 - 2.4 mg/dL   HEPATIC FUNCTION PANEL    Collection Time: 04/05/18  5:51 AM   Result Value Ref Range    Protein, total 5.7 (L) 6.3 - 8.2 g/dL    Albumin 2.2 (L) 3.5 - 5.0 g/dL    Globulin 3.5 2.3 - 3.5 g/dL    A-G Ratio 0.6 (L) 1.2 - 3.5      Bilirubin, total 0.3 0.2 - 1.1 MG/DL    Bilirubin, direct <0.1 <0.4 MG/DL    Alk.  phosphatase 52 50 - 136 U/L    AST (SGOT) 19 15 - 37 U/L    ALT (SGPT) 180 (H) 12 - 65 U/L   METABOLIC PANEL, BASIC    Collection Time: 04/05/18  5:51 AM   Result Value Ref Range    Sodium 139 136 - 145 mmol/L    Potassium 4.8 3.5 - 5.1 mmol/L    Chloride 102 98 - 107 mmol/L    CO2 27 21 - 32 mmol/L    Anion gap 10 7 - 16 mmol/L    Glucose 82 65 - 100 mg/dL    BUN 36 (H) 6 - 23 MG/DL    Creatinine 8.23 (H) 0.8 - 1.5 MG/DL    GFR est AA 10 (L) >60 ml/min/1.73m2    GFR est non-AA 8 (L) >60 ml/min/1.73m2    Calcium 8.6 8.3 - 10.4 MG/DL        All Micro Results     None          Current Meds:  Current Facility-Administered Medications   Medication Dose Route Frequency    sertraline (ZOLOFT) tablet 25 mg  25 mg Oral DAILY    amLODIPine (NORVASC) tablet 10 mg  10 mg Oral DAILY    diphenhydrAMINE (BENADRYL) capsule 25 mg  25 mg Oral QHS PRN    pantoprazole (PROTONIX) tablet 40 mg  40 mg Oral ACB    traMADol (ULTRAM) tablet 50 mg  50 mg Oral Q6H PRN    calcium carbonate (TUMS) chewable tablet 400 mg [elemental]  400 mg Oral TID PRN  hydrALAZINE (APRESOLINE) 20 mg/mL injection 10 mg  10 mg IntraVENous Q6H PRN    sodium chloride (NS) flush 5-10 mL  5-10 mL IntraVENous Q8H    sodium chloride (NS) flush 5-10 mL  5-10 mL IntraVENous PRN    acetaminophen (TYLENOL) tablet 650 mg  650 mg Oral Q4H PRN    ondansetron (ZOFRAN) injection 4 mg  4 mg IntraVENous Q4H PRN    heparin (porcine) injection 5,000 Units  5,000 Units SubCUTAneous Q8H       Other Studies (last 24 hours):  No results found.     Assessment and Plan:     Hospital Problems as of 4/3/2018  Date Reviewed: 3/29/2018          Codes Class Noted - Resolved POA    Chronic hepatitis C without hepatic coma ([facility] ICD-10-CM: B18.2  ICD-9-CM: 070.54  3/29/2018 - Present Unknown        Hepatitis ICD-10-CM: K75.9  ICD-9-CM: 573.3  3/27/2018 - Present Yes        Rhabdomyolysis ICD-10-CM: M62.82  ICD-9-CM: 728.88  3/27/2018 - Present Yes        * (Principal)ARF (acute renal failure) ([facility] ICD-10-CM: N17.9  ICD-9-CM: 584.9  3/26/2018 - Present Yes        Heroin abuse ICD-10-CM: F11.10  ICD-9-CM: 305.50  3/26/2018 - Present Yes        Tobacco abuse ICD-10-CM: Z72.0  ICD-9-CM: 305.1  3/26/2018 - Present Yes    GERD          PLAN:    AISSATOU- on dialysis- for biopsy on 4/6/18  gerd- on protonix  Hept c  Poly substance abuse  Tobacco abuse  htn- prn hydralazine, increase amlodipine to 10 mg    DC planning/Dispo:    DVT ppx:  heparin    Signed:  Magdaleno Bell MD

## 2018-04-05 NOTE — PROGRESS NOTES
TRANSFER - OUT REPORT:    Verbal report given to Merari Grier RN(name) on Heron Major  being transferred to IR(unit) for ordered procedure       Report consisted of patients Situation, Background, Assessment and   Recommendations(SBAR). Information from the following report(s) SBAR was reviewed with the receiving nurse. Lines:   Peripheral IV 03/29/18 Left Forearm (Active)   Site Assessment Clean, dry, & intact 4/5/2018  2:01 AM   Phlebitis Assessment 0 4/5/2018  2:01 AM   Infiltration Assessment 0 4/5/2018  2:01 AM   Dressing Status Clean, dry, & intact 4/5/2018  2:01 AM   Dressing Type Transparent;Tape 4/5/2018  2:01 AM   Hub Color/Line Status Flushed 4/5/2018  2:01 AM   Alcohol Cap Used No 4/1/2018  2:17 PM        Opportunity for questions and clarification was provided.       Patient transported with:   Monitor

## 2018-04-06 ENCOUNTER — APPOINTMENT (OUTPATIENT)
Dept: ULTRASOUND IMAGING | Age: 33
DRG: 674 | End: 2018-04-06
Attending: INTERNAL MEDICINE
Payer: SELF-PAY

## 2018-04-06 LAB
ALBUMIN SERPL-MCNC: 2.4 G/DL (ref 3.5–5)
ALBUMIN/GLOB SERPL: 0.7 {RATIO} (ref 1.2–3.5)
ALP SERPL-CCNC: 52 U/L (ref 50–136)
ALT SERPL-CCNC: 154 U/L (ref 12–65)
ANION GAP SERPL CALC-SCNC: 8 MMOL/L (ref 7–16)
AST SERPL-CCNC: 14 U/L (ref 15–37)
BILIRUB DIRECT SERPL-MCNC: <0.1 MG/DL
BILIRUB SERPL-MCNC: 0.3 MG/DL (ref 0.2–1.1)
BUN SERPL-MCNC: 52 MG/DL (ref 6–23)
CALCIUM SERPL-MCNC: 8.8 MG/DL (ref 8.3–10.4)
CHLORIDE SERPL-SCNC: 104 MMOL/L (ref 98–107)
CO2 SERPL-SCNC: 28 MMOL/L (ref 21–32)
CREAT SERPL-MCNC: 8.91 MG/DL (ref 0.8–1.5)
ERYTHROCYTE [DISTWIDTH] IN BLOOD BY AUTOMATED COUNT: 12.5 % (ref 11.9–14.6)
GLOBULIN SER CALC-MCNC: 3.5 G/DL (ref 2.3–3.5)
GLUCOSE SERPL-MCNC: 88 MG/DL (ref 65–100)
HCT VFR BLD AUTO: 35.6 % (ref 41.1–50.3)
HGB BLD-MCNC: 12.3 G/DL (ref 13.6–17.2)
MAGNESIUM SERPL-MCNC: 2.2 MG/DL (ref 1.8–2.4)
MCH RBC QN AUTO: 30.8 PG (ref 26.1–32.9)
MCHC RBC AUTO-ENTMCNC: 34.6 G/DL (ref 31.4–35)
MCV RBC AUTO: 89.2 FL (ref 79.6–97.8)
PLATELET # BLD AUTO: 368 K/UL (ref 150–450)
PMV BLD AUTO: 8.7 FL (ref 10.8–14.1)
POTASSIUM SERPL-SCNC: 4.8 MMOL/L (ref 3.5–5.1)
PROT SERPL-MCNC: 5.9 G/DL (ref 6.3–8.2)
RBC # BLD AUTO: 3.99 M/UL (ref 4.23–5.67)
SODIUM SERPL-SCNC: 140 MMOL/L (ref 136–145)
WBC # BLD AUTO: 9.5 K/UL (ref 4.3–11.1)

## 2018-04-06 PROCEDURE — 74011250636 HC RX REV CODE- 250/636: Performed by: RADIOLOGY

## 2018-04-06 PROCEDURE — 80048 BASIC METABOLIC PNL TOTAL CA: CPT | Performed by: INTERNAL MEDICINE

## 2018-04-06 PROCEDURE — 74011250636 HC RX REV CODE- 250/636

## 2018-04-06 PROCEDURE — 74011250637 HC RX REV CODE- 250/637: Performed by: INTERNAL MEDICINE

## 2018-04-06 PROCEDURE — 74011250637 HC RX REV CODE- 250/637: Performed by: FAMILY MEDICINE

## 2018-04-06 PROCEDURE — 80076 HEPATIC FUNCTION PANEL: CPT | Performed by: INTERNAL MEDICINE

## 2018-04-06 PROCEDURE — 74011250636 HC RX REV CODE- 250/636: Performed by: INTERNAL MEDICINE

## 2018-04-06 PROCEDURE — 74011000250 HC RX REV CODE- 250: Performed by: RADIOLOGY

## 2018-04-06 PROCEDURE — BT42ZZZ ULTRASONOGRAPHY OF LEFT KIDNEY: ICD-10-PCS | Performed by: RADIOLOGY

## 2018-04-06 PROCEDURE — 99152 MOD SED SAME PHYS/QHP 5/>YRS: CPT

## 2018-04-06 PROCEDURE — 36415 COLL VENOUS BLD VENIPUNCTURE: CPT | Performed by: INTERNAL MEDICINE

## 2018-04-06 PROCEDURE — 83735 ASSAY OF MAGNESIUM: CPT | Performed by: INTERNAL MEDICINE

## 2018-04-06 PROCEDURE — 65270000029 HC RM PRIVATE

## 2018-04-06 PROCEDURE — 85027 COMPLETE CBC AUTOMATED: CPT | Performed by: INTERNAL MEDICINE

## 2018-04-06 PROCEDURE — 88305 TISSUE EXAM BY PATHOLOGIST: CPT | Performed by: RADIOLOGY

## 2018-04-06 PROCEDURE — 77030003503 US GUIDE BX RENAL

## 2018-04-06 PROCEDURE — 0TB13ZX EXCISION OF LEFT KIDNEY, PERCUTANEOUS APPROACH, DIAGNOSTIC: ICD-10-PCS | Performed by: RADIOLOGY

## 2018-04-06 PROCEDURE — 90935 HEMODIALYSIS ONE EVALUATION: CPT

## 2018-04-06 RX ORDER — MIDAZOLAM HYDROCHLORIDE 1 MG/ML
.5-2 INJECTION, SOLUTION INTRAMUSCULAR; INTRAVENOUS
Status: DISCONTINUED | OUTPATIENT
Start: 2018-04-06 | End: 2018-04-12 | Stop reason: HOSPADM

## 2018-04-06 RX ORDER — SODIUM CHLORIDE 9 MG/ML
50 INJECTION, SOLUTION INTRAVENOUS CONTINUOUS
Status: ACTIVE | OUTPATIENT
Start: 2018-04-06 | End: 2018-04-06

## 2018-04-06 RX ORDER — DIPHENHYDRAMINE HYDROCHLORIDE 50 MG/ML
25-50 INJECTION, SOLUTION INTRAMUSCULAR; INTRAVENOUS ONCE
Status: COMPLETED | OUTPATIENT
Start: 2018-04-06 | End: 2018-04-06

## 2018-04-06 RX ORDER — HYDRALAZINE HYDROCHLORIDE 20 MG/ML
20 INJECTION INTRAMUSCULAR; INTRAVENOUS
Status: ACTIVE | OUTPATIENT
Start: 2018-04-06 | End: 2018-04-07

## 2018-04-06 RX ORDER — OXYCODONE HYDROCHLORIDE 5 MG/1
10 TABLET ORAL
Status: ACTIVE | OUTPATIENT
Start: 2018-04-06 | End: 2018-04-09

## 2018-04-06 RX ORDER — FENTANYL CITRATE 50 UG/ML
25-100 INJECTION, SOLUTION INTRAMUSCULAR; INTRAVENOUS
Status: DISCONTINUED | OUTPATIENT
Start: 2018-04-06 | End: 2018-04-12 | Stop reason: HOSPADM

## 2018-04-06 RX ORDER — LIDOCAINE HYDROCHLORIDE 20 MG/ML
1-20 INJECTION, SOLUTION INFILTRATION; PERINEURAL
Status: DISCONTINUED | OUTPATIENT
Start: 2018-04-06 | End: 2018-04-12 | Stop reason: HOSPADM

## 2018-04-06 RX ADMIN — PANTOPRAZOLE SODIUM 40 MG: 40 TABLET, DELAYED RELEASE ORAL at 05:10

## 2018-04-06 RX ADMIN — HEPARIN SODIUM 5000 UNITS: 5000 INJECTION, SOLUTION INTRAVENOUS; SUBCUTANEOUS at 21:17

## 2018-04-06 RX ADMIN — MIDAZOLAM HYDROCHLORIDE 1 MG: 1 INJECTION, SOLUTION INTRAMUSCULAR; INTRAVENOUS at 11:50

## 2018-04-06 RX ADMIN — Medication 10 ML: at 18:12

## 2018-04-06 RX ADMIN — Medication 10 ML: at 21:20

## 2018-04-06 RX ADMIN — ACETAMINOPHEN 650 MG: 500 TABLET, FILM COATED ORAL at 05:10

## 2018-04-06 RX ADMIN — ACETAMINOPHEN 650 MG: 500 TABLET, FILM COATED ORAL at 18:20

## 2018-04-06 RX ADMIN — ACETAMINOPHEN 650 MG: 500 TABLET, FILM COATED ORAL at 21:17

## 2018-04-06 RX ADMIN — MIDAZOLAM HYDROCHLORIDE 1 MG: 1 INJECTION, SOLUTION INTRAMUSCULAR; INTRAVENOUS at 11:56

## 2018-04-06 RX ADMIN — AMLODIPINE BESYLATE 10 MG: 10 TABLET ORAL at 08:34

## 2018-04-06 RX ADMIN — DIPHENHYDRAMINE HYDROCHLORIDE 50 MG: 50 INJECTION, SOLUTION INTRAMUSCULAR; INTRAVENOUS at 11:56

## 2018-04-06 RX ADMIN — SODIUM BICARBONATE 2 ML: 0.2 INJECTION, SOLUTION INTRAVENOUS at 11:57

## 2018-04-06 RX ADMIN — LIDOCAINE HYDROCHLORIDE 160 MG: 20 INJECTION, SOLUTION INFILTRATION; PERINEURAL at 11:57

## 2018-04-06 RX ADMIN — Medication 10 ML: at 05:11

## 2018-04-06 RX ADMIN — SERTRALINE HYDROCHLORIDE 25 MG: 50 TABLET ORAL at 08:35

## 2018-04-06 RX ADMIN — FENTANYL CITRATE 50 MCG: 50 INJECTION, SOLUTION INTRAMUSCULAR; INTRAVENOUS at 11:56

## 2018-04-06 RX ADMIN — FENTANYL CITRATE 50 MCG: 50 INJECTION, SOLUTION INTRAMUSCULAR; INTRAVENOUS at 11:50

## 2018-04-06 NOTE — PROGRESS NOTES
Admit Date: 3/26/2018      Subjective:          Review of Systems  Cardio-vascular: no chest pain, no SOB  GI: no N/V/D  : no dysuria, no hematuria    Objective:     Patient Vitals for the past 8 hrs:   BP Temp Pulse Resp SpO2   04/06/18 1255 (!) 139/91 - 64 - -   04/06/18 1240 140/88 - 75 20 91 %   04/06/18 1231 (!) 145/91 - 74 19 90 %   04/06/18 1221 148/90 - 75 18 91 %   04/06/18 1211 (!) 159/91 - 78 19 93 %   04/06/18 1201 (!) 135/97 - 77 18 94 %   04/06/18 1156 145/89 - 80 18 94 %   04/06/18 1151 137/86 - 72 16 97 %   04/06/18 1144 142/86 - 66 16 96 %   04/06/18 0925 (!) 142/93 98.6 °F (37 °C) 62 17 94 %   04/06/18 0702 138/86 97.9 °F (36.6 °C) 63 16 96 %   04/06/18 0606 135/83 98 °F (36.7 °C) 75 16 96 %     04/06 0701 - 04/06 1900  In: 0   Out: 600 [Urine:600]      Physical Exam:   Lungs: clear  CV: RR, no JVD  Abdomen: soft, not tender, no rebound. Ext: no edema          Data Review   Recent Results (from the past 8 hour(s))   MAGNESIUM    Collection Time: 04/06/18  6:55 AM   Result Value Ref Range    Magnesium 2.2 1.8 - 2.4 mg/dL   HEPATIC FUNCTION PANEL    Collection Time: 04/06/18  6:55 AM   Result Value Ref Range    Protein, total 5.9 (L) 6.3 - 8.2 g/dL    Albumin 2.4 (L) 3.5 - 5.0 g/dL    Globulin 3.5 2.3 - 3.5 g/dL    A-G Ratio 0.7 (L) 1.2 - 3.5      Bilirubin, total 0.3 0.2 - 1.1 MG/DL    Bilirubin, direct <0.1 <0.4 MG/DL    Alk.  phosphatase 52 50 - 136 U/L    AST (SGOT) 14 (L) 15 - 37 U/L    ALT (SGPT) 154 (H) 12 - 65 U/L   METABOLIC PANEL, BASIC    Collection Time: 04/06/18  6:55 AM   Result Value Ref Range    Sodium 140 136 - 145 mmol/L    Potassium 4.8 3.5 - 5.1 mmol/L    Chloride 104 98 - 107 mmol/L    CO2 28 21 - 32 mmol/L    Anion gap 8 7 - 16 mmol/L    Glucose 88 65 - 100 mg/dL    BUN 52 (H) 6 - 23 MG/DL    Creatinine 8.91 (H) 0.8 - 1.5 MG/DL    GFR est AA 9 (L) >60 ml/min/1.73m2    GFR est non-AA 7 (L) >60 ml/min/1.73m2    Calcium 8.8 8.3 - 10.4 MG/DL   CBC W/O DIFF    Collection Time: 04/06/18  6:55 AM   Result Value Ref Range    WBC 9.5 4.3 - 11.1 K/uL    RBC 3.99 (L) 4.23 - 5.67 M/uL    HGB 12.3 (L) 13.6 - 17.2 g/dL    HCT 35.6 (L) 41.1 - 50.3 %    MCV 89.2 79.6 - 97.8 FL    MCH 30.8 26.1 - 32.9 PG    MCHC 34.6 31.4 - 35.0 g/dL    RDW 12.5 11.9 - 14.6 %    PLATELET 080 222 - 402 K/uL    MPV 8.7 (L) 10.8 - 14.1 FL           Assessment:     Principal Problem:    ARF (acute renal failure) (HCC) (3/26/2018)    Active Problems:    Heroin abuse (3/26/2018)      Tobacco abuse (3/26/2018)      Hepatitis (3/27/2018)      Rhabdomyolysis (3/27/2018)      Chronic hepatitis C without hepatic coma ([facility] (3/29/2018)      GERD (gastroesophageal reflux disease) (4/3/2018)        Plan:     No renal recovery.   Seen on HD  No complication    Maximus Breaux MD

## 2018-04-06 NOTE — INTERDISCIPLINARY ROUNDS
Interdisciplinary Rounds completed 04/05/2018.  Nursing, Case Management, Physician and PT present.     Plan of care reviewed and updated.

## 2018-04-06 NOTE — PROGRESS NOTES
TRANSFER - OUT REPORT:    Verbal report given to Kaushal Bhandari RN(name) on Edna Avalos  being transferred to [facility](unit) for routine progression of care       Report consisted of patients Situation, Background, Assessment and   Recommendations(SBAR). Information from the following report(s) Procedure Summary and MAR was reviewed with the receiving nurse. Lines:   Peripheral IV 03/29/18 Left Forearm (Active)   Site Assessment Clean, dry, & intact 4/6/2018  7:22 AM   Phlebitis Assessment 0 4/6/2018  7:22 AM   Infiltration Assessment 0 4/6/2018  7:22 AM   Dressing Status Clean, dry, & intact 4/6/2018  7:22 AM   Dressing Type Tape;Transparent 4/6/2018  7:22 AM   Hub Color/Line Status Flushed 4/6/2018  3:24 AM   Alcohol Cap Used No 4/6/2018  7:22 AM        Opportunity for questions and clarification was provided.

## 2018-04-06 NOTE — PROGRESS NOTES
Hospitalist Progress Note     Admit Date:  3/26/2018  8:10 AM   Name:  Tyra Severin   Age:  26 y.o.  :  1985   MRN:  [id number]   PCP:  None  Treatment Team: Attending Provider: Marilu Valerio MD; Care Manager: Jeremy Hernandez RN; Consulting Provider: Michelle Valenzuela MD    Subjective:   Mr Dneia Kumar is a 26 yom with a hx of iv drug abuse who was admitted for nausea and vomiting. He Stated symptoms began after injecting heroin. Patient noted to have AISSATOU with Scr of 4.92, oliguric. Despite hydration, his kidney status worsened and he was placed under HD. Work up sent, he tested positive for Hep C, and low complements levels. Has a family history of SLE on his mother. Renal is planning a biopsy this week. Since patient is self-paid, case is difficult.  on board for placement. 4/3/18  Says abd pain better after protonix    18  Saw pt in dialysis- no complaints    18  Went for biopsy today- but rescheduled to tomorrow  No other complaints  Staff say pt calm and anxious at times--?  Depressed- when asked pt says not depressed but was ok to start medications     18   for renal biopsy today    Objective:     Patient Vitals for the past 24 hrs:   Temp Pulse Resp BP SpO2   18 0925 98.6 °F (37 °C) 62 17 (!) 142/93 94 %   18 0702 97.9 °F (36.6 °C) 63 16 138/86 96 %   18 0606 98 °F (36.7 °C) 75 16 135/83 96 %   18 2317 97.9 °F (36.6 °C) 77 16 144/88 96 %   18 1919 98 °F (36.7 °C) 77 16 142/90 96 %   18 1424 97.8 °F (36.6 °C) 73 17 117/79 94 %   18 1213 97.7 °F (36.5 °C) 70 17 (!) 151/93 98 %     Oxygen Therapy  O2 Sat (%): 94 % (18 0925)  Pulse via Oximetry: 75 beats per minute (18 1624)  O2 Device: Room air (18 0901)  O2 Flow Rate (L/min): 2 l/min (18 1505)  ETCO2 (mmHg): 31 mmHg (18 1550)    Intake/Output Summary (Last 24 hours) at 18 1136  Last data filed at 18 0849   Gross per 24 hour   Intake 0 ml   Output                0 ml   Net                0 ml         General:    Well nourished. Alert.    heent- normal  CV:   RRR. No murmur, rub, or gallop. Lungs:   Clear to auscultation bilaterally. No wheezing, rhonchi, or rales. Temporary dialysis cath rt chest  Cns- no focal neurological deficits  Abdomen:   Soft, nontender, nondistended. Extremities: Warm and dry. No cyanosis or edema. Skin:     No rashes or jaundice. Data Review:  I have reviewed all labs, meds, telemetry events, and studies from the last 24 hours. Recent Results (from the past 24 hour(s))   MAGNESIUM    Collection Time: 04/06/18  6:55 AM   Result Value Ref Range    Magnesium 2.2 1.8 - 2.4 mg/dL   HEPATIC FUNCTION PANEL    Collection Time: 04/06/18  6:55 AM   Result Value Ref Range    Protein, total 5.9 (L) 6.3 - 8.2 g/dL    Albumin 2.4 (L) 3.5 - 5.0 g/dL    Globulin 3.5 2.3 - 3.5 g/dL    A-G Ratio 0.7 (L) 1.2 - 3.5      Bilirubin, total 0.3 0.2 - 1.1 MG/DL    Bilirubin, direct <0.1 <0.4 MG/DL    Alk.  phosphatase 52 50 - 136 U/L    AST (SGOT) 14 (L) 15 - 37 U/L    ALT (SGPT) 154 (H) 12 - 65 U/L   METABOLIC PANEL, BASIC    Collection Time: 04/06/18  6:55 AM   Result Value Ref Range    Sodium 140 136 - 145 mmol/L    Potassium 4.8 3.5 - 5.1 mmol/L    Chloride 104 98 - 107 mmol/L    CO2 28 21 - 32 mmol/L    Anion gap 8 7 - 16 mmol/L    Glucose 88 65 - 100 mg/dL    BUN 52 (H) 6 - 23 MG/DL    Creatinine 8.91 (H) 0.8 - 1.5 MG/DL    GFR est AA 9 (L) >60 ml/min/1.73m2    GFR est non-AA 7 (L) >60 ml/min/1.73m2    Calcium 8.8 8.3 - 10.4 MG/DL   CBC W/O DIFF    Collection Time: 04/06/18  6:55 AM   Result Value Ref Range    WBC 9.5 4.3 - 11.1 K/uL    RBC 3.99 (L) 4.23 - 5.67 M/uL    HGB 12.3 (L) 13.6 - 17.2 g/dL    HCT 35.6 (L) 41.1 - 50.3 %    MCV 89.2 79.6 - 97.8 FL    MCH 30.8 26.1 - 32.9 PG    MCHC 34.6 31.4 - 35.0 g/dL    RDW 12.5 11.9 - 14.6 %    PLATELET 974 964 - 869 K/uL    MPV 8.7 (L) 10.8 - 14.1 FL        All Micro Results     None          Current Meds:  Current Facility-Administered Medications   Medication Dose Route Frequency    lidocaine (XYLOCAINE) 20 mg/mL (2 %) injection  mg  1-20 mL SubCUTAneous Multiple    sodium bicarbonate (4%) (NEUT) injection 1-2 mL  1-2 mL SubCUTAneous ONCE    diphenhydrAMINE (BENADRYL) injection 25-50 mg  25-50 mg IntraVENous ONCE    fentaNYL citrate (PF) injection  mcg   mcg IntraVENous Multiple    midazolam (VERSED) injection 0.5-2 mg  0.5-2 mg IntraVENous Rad Multiple    sertraline (ZOLOFT) tablet 25 mg  25 mg Oral DAILY    amLODIPine (NORVASC) tablet 10 mg  10 mg Oral DAILY    diphenhydrAMINE (BENADRYL) capsule 25 mg  25 mg Oral QHS PRN    pantoprazole (PROTONIX) tablet 40 mg  40 mg Oral ACB    traMADol (ULTRAM) tablet 50 mg  50 mg Oral Q6H PRN    calcium carbonate (TUMS) chewable tablet 400 mg [elemental]  400 mg Oral TID PRN    hydrALAZINE (APRESOLINE) 20 mg/mL injection 10 mg  10 mg IntraVENous Q6H PRN    sodium chloride (NS) flush 5-10 mL  5-10 mL IntraVENous Q8H    sodium chloride (NS) flush 5-10 mL  5-10 mL IntraVENous PRN    acetaminophen (TYLENOL) tablet 650 mg  650 mg Oral Q4H PRN    ondansetron (ZOFRAN) injection 4 mg  4 mg IntraVENous Q4H PRN    heparin (porcine) injection 5,000 Units  5,000 Units SubCUTAneous Q8H       Other Studies (last 24 hours):  No results found.     Assessment and Plan:     Hospital Problems as of 4/3/2018  Date Reviewed: 3/29/2018          Codes Class Noted - Resolved POA    Chronic hepatitis C without hepatic coma ([facility] ICD-10-CM: B18.2  ICD-9-CM: 070.54  3/29/2018 - Present Unknown        Hepatitis ICD-10-CM: K75.9  ICD-9-CM: 573.3  3/27/2018 - Present Yes        Rhabdomyolysis ICD-10-CM: M62.82  ICD-9-CM: 728.88  3/27/2018 - Present Yes        * (Principal)ARF (acute renal failure) ([facility] ICD-10-CM: N17.9  ICD-9-CM: 584.9  3/26/2018 - Present Yes        Heroin abuse ICD-10-CM: F11.10  ICD-9-CM: 305.50  3/26/2018 - Present Yes        Tobacco abuse ICD-10-CM: Z72.0  ICD-9-CM: 305.1  3/26/2018 - Present Yes    GERD          PLAN:    AISSATOU- on dialysis- for biopsy on 4/6/18  gerd- on protonix  Hept c  Poly substance abuse  Tobacco abuse  htn- prn hydralazine, increase amlodipine to 10 mg    DC planning/Dispo:    DVT ppx:  heparin    Signed:  Gonzalez Nye MD

## 2018-04-06 NOTE — PROGRESS NOTES
Hourly rounds done. Pt c/o headache, medicated per MAR. Denies nausea, vomiting. Voiding this shift, no BM this shift. NPO at midnight. All needs met at this time.

## 2018-04-06 NOTE — PROGRESS NOTES
TRANSFER - IN REPORT:    Verbal report received from ELEONORA Garnica(name) on Esperanza Yan  being received from IR(unit) for routine progression of care      Report consisted of patients Situation, Background, Assessment and   Recommendations(SBAR). Information from the following report(s) SBAR was reviewed with the receiving nurse. Opportunity for questions and clarification was provided. Assessment completed upon patients arrival to unit and care assumed.

## 2018-04-07 LAB
ALBUMIN SERPL-MCNC: 2.6 G/DL (ref 3.5–5)
ALBUMIN/GLOB SERPL: 0.7 {RATIO} (ref 1.2–3.5)
ALP SERPL-CCNC: 56 U/L (ref 50–136)
ALT SERPL-CCNC: 136 U/L (ref 12–65)
AST SERPL-CCNC: 14 U/L (ref 15–37)
BILIRUB DIRECT SERPL-MCNC: 0.1 MG/DL
BILIRUB SERPL-MCNC: 0.4 MG/DL (ref 0.2–1.1)
GLOBULIN SER CALC-MCNC: 3.5 G/DL (ref 2.3–3.5)
MAGNESIUM SERPL-MCNC: 2 MG/DL (ref 1.8–2.4)
PROT SERPL-MCNC: 6.1 G/DL (ref 6.3–8.2)

## 2018-04-07 PROCEDURE — 74011250636 HC RX REV CODE- 250/636: Performed by: INTERNAL MEDICINE

## 2018-04-07 PROCEDURE — 80076 HEPATIC FUNCTION PANEL: CPT | Performed by: INTERNAL MEDICINE

## 2018-04-07 PROCEDURE — 36415 COLL VENOUS BLD VENIPUNCTURE: CPT | Performed by: INTERNAL MEDICINE

## 2018-04-07 PROCEDURE — 74011250637 HC RX REV CODE- 250/637: Performed by: FAMILY MEDICINE

## 2018-04-07 PROCEDURE — 83735 ASSAY OF MAGNESIUM: CPT | Performed by: INTERNAL MEDICINE

## 2018-04-07 PROCEDURE — 74011250637 HC RX REV CODE- 250/637: Performed by: INTERNAL MEDICINE

## 2018-04-07 PROCEDURE — 65270000029 HC RM PRIVATE

## 2018-04-07 RX ADMIN — HEPARIN SODIUM 5000 UNITS: 5000 INJECTION, SOLUTION INTRAVENOUS; SUBCUTANEOUS at 21:50

## 2018-04-07 RX ADMIN — AMLODIPINE BESYLATE 10 MG: 10 TABLET ORAL at 09:02

## 2018-04-07 RX ADMIN — PANTOPRAZOLE SODIUM 40 MG: 40 TABLET, DELAYED RELEASE ORAL at 05:27

## 2018-04-07 RX ADMIN — Medication 10 ML: at 21:53

## 2018-04-07 RX ADMIN — Medication 10 ML: at 13:18

## 2018-04-07 RX ADMIN — ACETAMINOPHEN 650 MG: 500 TABLET, FILM COATED ORAL at 05:27

## 2018-04-07 RX ADMIN — HEPARIN SODIUM 5000 UNITS: 5000 INJECTION, SOLUTION INTRAVENOUS; SUBCUTANEOUS at 16:46

## 2018-04-07 RX ADMIN — ACETAMINOPHEN 650 MG: 500 TABLET, FILM COATED ORAL at 16:43

## 2018-04-07 RX ADMIN — Medication 10 ML: at 05:28

## 2018-04-07 RX ADMIN — ACETAMINOPHEN 650 MG: 500 TABLET, FILM COATED ORAL at 21:50

## 2018-04-07 RX ADMIN — SERTRALINE HYDROCHLORIDE 25 MG: 50 TABLET ORAL at 09:02

## 2018-04-07 RX ADMIN — HEPARIN SODIUM 5000 UNITS: 5000 INJECTION, SOLUTION INTRAVENOUS; SUBCUTANEOUS at 05:27

## 2018-04-07 NOTE — PROGRESS NOTES
Admit Date: 3/26/2018      Subjective:          Review of Systems  Cardio-vascular: no chest pain, no SOB  GI: no N/V/D  : no dysuria, no hematuria    Objective:     Patient Vitals for the past 8 hrs:   BP Temp Pulse Resp SpO2 Weight   04/07/18 0706 117/78 98 °F (36.7 °C) 76 16 94 % -   04/07/18 0414 - - - - - 76.2 kg (168 lb)            Physical Exam:   Lungs: clear  CV: RR, no JVD  Abdomen: soft, not tender, no rebound. Ext: no edema          Data Review   Recent Results (from the past 8 hour(s))   MAGNESIUM    Collection Time: 04/07/18  5:41 AM   Result Value Ref Range    Magnesium 2.0 1.8 - 2.4 mg/dL   HEPATIC FUNCTION PANEL    Collection Time: 04/07/18  5:41 AM   Result Value Ref Range    Protein, total 6.1 (L) 6.3 - 8.2 g/dL    Albumin 2.6 (L) 3.5 - 5.0 g/dL    Globulin 3.5 2.3 - 3.5 g/dL    A-G Ratio 0.7 (L) 1.2 - 3.5      Bilirubin, total 0.4 0.2 - 1.1 MG/DL    Bilirubin, direct 0.1 <0.4 MG/DL    Alk.  phosphatase 56 50 - 136 U/L    AST (SGOT) 14 (L) 15 - 37 U/L    ALT (SGPT) 136 (H) 12 - 65 U/L           Assessment:     Principal Problem:    ARF (acute renal failure) (HCC) (3/26/2018)    Active Problems:    Heroin abuse (3/26/2018)      Tobacco abuse (3/26/2018)      Hepatitis (3/27/2018)      Rhabdomyolysis (3/27/2018)      Chronic hepatitis C without hepatic coma ([facility] (3/29/2018)      GERD (gastroesophageal reflux disease) (4/3/2018)        Plan:     F/u I/O and lab in cipriano Christie MD

## 2018-04-07 NOTE — PROGRESS NOTES
Hospitalist Progress Note     Admit Date:  3/26/2018  8:10 AM   Name:  Maia Aponte   Age:  26 y.o.  :  1985   MRN:  [id number]   PCP:  None  Treatment Team: Attending Provider: Darling Gomez MD; Care Manager: Dulce Osborn RN; Consulting Provider: Darci Yañez MD    Subjective:   Mr Kristen Rutherford is a 26 yom with a hx of iv drug abuse who was admitted for nausea and vomiting. He Stated symptoms began after injecting heroin. Patient noted to have AISSATOU with Scr of 4.92, oliguric. Despite hydration, his kidney status worsened and he was placed under HD. Work up sent, he tested positive for Hep C, and low complements levels. Has a family history of SLE on his mother. Renal is planning a biopsy this week. Since patient is self-paid, case is difficult.  on board for placement. 4/3/18  Says abd pain better after protonix    18  Saw pt in dialysis- no complaints    18  Went for biopsy today- but rescheduled to tomorrow  No other complaints  Staff say pt calm and anxious at times--?  Depressed- when asked pt says not depressed but was ok to start medications     18   for renal biopsy today    18  Mild pain renal biopsy site on movement  No other complaints    Objective:     Patient Vitals for the past 24 hrs:   Temp Pulse Resp BP SpO2   18 1053 98.3 °F (36.8 °C) 75 18 142/87 95 %   18 0706 98 °F (36.7 °C) 76 16 117/78 94 %   18 2318 98 °F (36.7 °C) 61 17 127/70 99 %   18 1935 98.4 °F (36.9 °C) 65 17 132/68 98 %   18 1551 98.3 °F (36.8 °C) 63 18 152/88 100 %   18 1528 - 61 - 139/89 -   18 1456 - 63 - 137/89 -   18 1432 - 67 - 127/86 -   18 1352 - 65 - 124/84 -   18 1326 - 66 - (!) 146/100 -   18 1255 - 64 - (!) 139/91 -   18 1240 - 75 20 140/88 91 %   18 1231 - 74 19 (!) 145/91 90 %   18 1221 - 75 18 148/90 91 %   18 1211 - 78 19 (!) 159/91 93 %   18 1201 - 77 18 (!) 135/97 94 % 04/06/18 1156 - 80 18 145/89 94 %   04/06/18 1151 - 72 16 137/86 97 %   04/06/18 1144 - 66 16 142/86 96 %     Oxygen Therapy  O2 Sat (%): 95 % (04/07/18 1053)  Pulse via Oximetry: 72 beats per minute (04/06/18 1240)  O2 Device: CO2 nasal cannula (04/06/18 1201)  O2 Flow Rate (L/min): 3 l/min (04/06/18 1201)  ETCO2 (mmHg): 0.4 mmHg (04/06/18 1201)    Intake/Output Summary (Last 24 hours) at 04/07/18 1115  Last data filed at 04/07/18 0804   Gross per 24 hour   Intake              831 ml   Output             1100 ml   Net             -269 ml         General:    Well nourished. Alert.    heent- normal  CV:   RRR. No murmur, rub, or gallop. Lungs:   Clear to auscultation bilaterally. No wheezing, rhonchi, or rales. Temporary dialysis cath rt chest  Cns- no focal neurological deficits  Abdomen:   Soft, nontender, nondistended. Dressing to the Lt flank region, no bleeding- minimal tender  Extremities: Warm and dry. No cyanosis or edema. Skin:     No rashes or jaundice. Data Review:  I have reviewed all labs, meds, telemetry events, and studies from the last 24 hours. Recent Results (from the past 24 hour(s))   MAGNESIUM    Collection Time: 04/07/18  5:41 AM   Result Value Ref Range    Magnesium 2.0 1.8 - 2.4 mg/dL   HEPATIC FUNCTION PANEL    Collection Time: 04/07/18  5:41 AM   Result Value Ref Range    Protein, total 6.1 (L) 6.3 - 8.2 g/dL    Albumin 2.6 (L) 3.5 - 5.0 g/dL    Globulin 3.5 2.3 - 3.5 g/dL    A-G Ratio 0.7 (L) 1.2 - 3.5      Bilirubin, total 0.4 0.2 - 1.1 MG/DL    Bilirubin, direct 0.1 <0.4 MG/DL    Alk.  phosphatase 56 50 - 136 U/L    AST (SGOT) 14 (L) 15 - 37 U/L    ALT (SGPT) 136 (H) 12 - 65 U/L        All Micro Results     None          Current Meds:  Current Facility-Administered Medications   Medication Dose Route Frequency    lidocaine (XYLOCAINE) 20 mg/mL (2 %) injection  mg  1-20 mL SubCUTAneous Multiple    fentaNYL citrate (PF) injection  mcg   mcg IntraVENous Multiple    midazolam (VERSED) injection 0.5-2 mg  0.5-2 mg IntraVENous Rad Multiple    oxyCODONE IR (ROXICODONE) tablet 10 mg  10 mg Oral Q4H PRN    sertraline (ZOLOFT) tablet 25 mg  25 mg Oral DAILY    amLODIPine (NORVASC) tablet 10 mg  10 mg Oral DAILY    diphenhydrAMINE (BENADRYL) capsule 25 mg  25 mg Oral QHS PRN    pantoprazole (PROTONIX) tablet 40 mg  40 mg Oral ACB    traMADol (ULTRAM) tablet 50 mg  50 mg Oral Q6H PRN    calcium carbonate (TUMS) chewable tablet 400 mg [elemental]  400 mg Oral TID PRN    sodium chloride (NS) flush 5-10 mL  5-10 mL IntraVENous Q8H    sodium chloride (NS) flush 5-10 mL  5-10 mL IntraVENous PRN    acetaminophen (TYLENOL) tablet 650 mg  650 mg Oral Q4H PRN    ondansetron (ZOFRAN) injection 4 mg  4 mg IntraVENous Q4H PRN    heparin (porcine) injection 5,000 Units  5,000 Units SubCUTAneous Q8H       Other Studies (last 24 hours):  Us Guide Bx Renal    Result Date: 4/6/2018  Title: Kidney biopsy. Indication: 26-year-old man with acute renal failure, hypertension, heroine abuse, opioid abuse, hepatitis C infection. Diagnostic biopsy requested. Consent: Informed written and oral consent was obtained from the patient after explanation of benefits and risks (including, but not limited to: Infection, Visceral injury, Hemorrhage). The patient's questions were answered to their satisfaction. The patient stated understanding and requested that we proceed. Procedure:  Sterile barrier technique (including:  cap, mask, sterile gloves, sterile sheet, hand hygiene, and chlorhexidene for cutaneous antisepsis) were used. With the patient prone, the skin of the left flank was prepped and draped in the standard sterile fashion. Using real-time ultrasound guidance, a 17-gauge needle was advanced into the lower pole of the left kidney. Through this base needle, multiple 18-gauge core biopsies were obtained.   The patient moved frequently during the procedure and ultimately ended up in a near left lateral decubitus position. The specimen was placed into formalin, formalin, Manpreet solution. A gelfoam slurry was then administered during needle removal. A bandage was applied. Delayed imaging demonstrates no significant hematoma. Complications: None. Medications:  Under physician supervision, 30 minutes of moderate intravenous conscious sedation was performed by administering Versed, Fentanyl, Benadryl intravenously. Continuous pulse oximetry, heart rate, and blood pressure monitoring was performed with an independent, trained observer present. Findings: The cortex and medullary fat are essentially isoechoic. No hemorrhage identified following the biopsy. Impression: Technically successful ultrasound guided left renal biopsy. Plan: The patient will recover at bedrest for 3 hours. He will undergo hemodialysis later today. We have requested no heparin to be used for the next 24 hours. Following dialysis, he will return to his hospital room.         Assessment and Plan:     Hospital Problems as of 4/3/2018  Date Reviewed: 3/29/2018          Codes Class Noted - Resolved POA    Chronic hepatitis C without hepatic coma ([facility] ICD-10-CM: B18.2  ICD-9-CM: 070.54  3/29/2018 - Present Unknown        Hepatitis ICD-10-CM: K75.9  ICD-9-CM: 573.3  3/27/2018 - Present Yes        Rhabdomyolysis ICD-10-CM: M62.82  ICD-9-CM: 728.88  3/27/2018 - Present Yes        * (Principal)ARF (acute renal failure) ([facility] ICD-10-CM: N17.9  ICD-9-CM: 584.9  3/26/2018 - Present Yes        Heroin abuse ICD-10-CM: F11.10  ICD-9-CM: 305.50  3/26/2018 - Present Yes        Tobacco abuse ICD-10-CM: Z72.0  ICD-9-CM: 305.1  3/26/2018 - Present Yes    GERD          PLAN:    AISSATOU- on dialysis-had biopsy on 4/6/18  gerd- on protonix  Hept c  Poly substance abuse  Tobacco abuse  htn- prn hydralazine,amlodipine to 10 mg  On zoloft    DC planning/Dispo:    DVT ppx:  heparin    Signed:  Karthik Gautam MD

## 2018-04-07 NOTE — PROGRESS NOTES
Hourly rounds done. Pt c/o headache, medicated per MAR. Denies nausea, vomiting. Voiding this shift, yellow/straw/clear. No BM this shift. All needs met at this time.

## 2018-04-08 LAB
ALBUMIN SERPL-MCNC: 2.6 G/DL (ref 3.5–5)
ALBUMIN/GLOB SERPL: 0.7 {RATIO} (ref 1.2–3.5)
ALP SERPL-CCNC: 58 U/L (ref 50–136)
ALT SERPL-CCNC: 120 U/L (ref 12–65)
ANION GAP SERPL CALC-SCNC: 11 MMOL/L (ref 7–16)
AST SERPL-CCNC: 15 U/L (ref 15–37)
BILIRUB DIRECT SERPL-MCNC: 0.1 MG/DL
BILIRUB SERPL-MCNC: 0.3 MG/DL (ref 0.2–1.1)
BUN SERPL-MCNC: 47 MG/DL (ref 6–23)
CALCIUM SERPL-MCNC: 9.2 MG/DL (ref 8.3–10.4)
CHLORIDE SERPL-SCNC: 104 MMOL/L (ref 98–107)
CO2 SERPL-SCNC: 27 MMOL/L (ref 21–32)
CREAT SERPL-MCNC: 5.58 MG/DL (ref 0.8–1.5)
GLOBULIN SER CALC-MCNC: 3.9 G/DL (ref 2.3–3.5)
GLUCOSE SERPL-MCNC: 91 MG/DL (ref 65–100)
MAGNESIUM SERPL-MCNC: 1.9 MG/DL (ref 1.8–2.4)
POTASSIUM SERPL-SCNC: 4.1 MMOL/L (ref 3.5–5.1)
PROT SERPL-MCNC: 6.5 G/DL (ref 6.3–8.2)
SODIUM SERPL-SCNC: 142 MMOL/L (ref 136–145)

## 2018-04-08 PROCEDURE — 80048 BASIC METABOLIC PNL TOTAL CA: CPT | Performed by: INTERNAL MEDICINE

## 2018-04-08 PROCEDURE — 65270000029 HC RM PRIVATE

## 2018-04-08 PROCEDURE — 36415 COLL VENOUS BLD VENIPUNCTURE: CPT | Performed by: INTERNAL MEDICINE

## 2018-04-08 PROCEDURE — 74011250636 HC RX REV CODE- 250/636: Performed by: INTERNAL MEDICINE

## 2018-04-08 PROCEDURE — 74011250637 HC RX REV CODE- 250/637: Performed by: FAMILY MEDICINE

## 2018-04-08 PROCEDURE — 74011250637 HC RX REV CODE- 250/637: Performed by: INTERNAL MEDICINE

## 2018-04-08 PROCEDURE — 80076 HEPATIC FUNCTION PANEL: CPT | Performed by: INTERNAL MEDICINE

## 2018-04-08 PROCEDURE — 83735 ASSAY OF MAGNESIUM: CPT | Performed by: INTERNAL MEDICINE

## 2018-04-08 RX ADMIN — DIPHENHYDRAMINE HYDROCHLORIDE 25 MG: 25 CAPSULE ORAL at 14:02

## 2018-04-08 RX ADMIN — PANTOPRAZOLE SODIUM 40 MG: 40 TABLET, DELAYED RELEASE ORAL at 05:09

## 2018-04-08 RX ADMIN — Medication 10 ML: at 14:09

## 2018-04-08 RX ADMIN — Medication 10 ML: at 22:20

## 2018-04-08 RX ADMIN — HEPARIN SODIUM 5000 UNITS: 5000 INJECTION, SOLUTION INTRAVENOUS; SUBCUTANEOUS at 14:08

## 2018-04-08 RX ADMIN — HEPARIN SODIUM 5000 UNITS: 5000 INJECTION, SOLUTION INTRAVENOUS; SUBCUTANEOUS at 05:09

## 2018-04-08 RX ADMIN — Medication 10 ML: at 05:10

## 2018-04-08 RX ADMIN — ACETAMINOPHEN 650 MG: 500 TABLET, FILM COATED ORAL at 05:11

## 2018-04-08 RX ADMIN — HEPARIN SODIUM 5000 UNITS: 5000 INJECTION, SOLUTION INTRAVENOUS; SUBCUTANEOUS at 22:18

## 2018-04-08 RX ADMIN — ACETAMINOPHEN 650 MG: 500 TABLET, FILM COATED ORAL at 22:18

## 2018-04-08 RX ADMIN — AMLODIPINE BESYLATE 10 MG: 10 TABLET ORAL at 09:16

## 2018-04-08 RX ADMIN — SERTRALINE HYDROCHLORIDE 25 MG: 50 TABLET ORAL at 09:16

## 2018-04-08 RX ADMIN — ACETAMINOPHEN 650 MG: 500 TABLET, FILM COATED ORAL at 14:02

## 2018-04-08 NOTE — PROGRESS NOTES
Hourly rounds done. Pt c/o headache, medicated per MAR. Denies nausea, vomiting. Voiding this shift, no BM this shift. All needs met at this time.

## 2018-04-08 NOTE — PROGRESS NOTES
Hospitalist Progress Note     Admit Date:  3/26/2018  8:10 AM   Name:  Sebastian Ovalles   Age:  26 y.o.  :  1985   MRN:  [id number]   PCP:  None  Treatment Team: Attending Provider: Eileen Cordoba MD; Care Manager: Toy Henriquez RN; Consulting Provider: Logan Lu MD    Subjective:   Mr Margaret Garcai is a 26 yom with a hx of iv drug abuse who was admitted for nausea and vomiting. He Stated symptoms began after injecting heroin. Patient noted to have AISSATOU with Scr of 4.92, oliguric. Despite hydration, his kidney status worsened and he was placed under HD. Work up sent, he tested positive for Hep C, and low complements levels. Has a family history of SLE on his mother. Renal is planning a biopsy this week. Since patient is self-paid, case is difficult.  on board for placement. 4/3/18  Says abd pain better after protonix    18  Saw pt in dialysis- no complaints    18  Went for biopsy today- but rescheduled to tomorrow  No other complaints  Staff say pt calm and anxious at times--?  Depressed- when asked pt says not depressed but was ok to start medications     18   for renal biopsy today    18  Mild pain renal biopsy site on movement  No other complaints    18  No complaints except for mild discomfort over the biopsy site    Objective:     Patient Vitals for the past 24 hrs:   Temp Pulse Resp BP SpO2   18 0735 98.1 °F (36.7 °C) 69 18 112/67 96 %   18 0518 - 70 17 134/87 95 %   18 1917 99 °F (37.2 °C) 89 17 138/89 94 %   18 1458 98.4 °F (36.9 °C) 82 20 122/77 95 %   18 1053 98.3 °F (36.8 °C) 75 18 142/87 95 %     Oxygen Therapy  O2 Sat (%): 96 % (18 0735)  Pulse via Oximetry: 72 beats per minute (18 1240)  O2 Device: CO2 nasal cannula (18 1201)  O2 Flow Rate (L/min): 3 l/min (18 1201)  ETCO2 (mmHg): 0.4 mmHg (18 1201)    Intake/Output Summary (Last 24 hours) at 18 1002  Last data filed at 18 3585 Gross per 24 hour   Intake             1280 ml   Output                0 ml   Net             1280 ml         General:    Well nourished. Alert.    heent- normal  CV:   RRR. No murmur, rub, or gallop. Lungs:   Clear to auscultation bilaterally. No wheezing, rhonchi, or rales. Temporary dialysis cath rt chest  Cns- no focal neurological deficits  Abdomen:   Soft, nontender, nondistended. Dressing to the Lt flank region, no bleeding- minimal tender  Extremities: Warm and dry. No cyanosis or edema. Skin:     No rashes or jaundice. Data Review:  I have reviewed all labs, meds, telemetry events, and studies from the last 24 hours. Recent Results (from the past 24 hour(s))   MAGNESIUM    Collection Time: 04/08/18  5:14 AM   Result Value Ref Range    Magnesium 1.9 1.8 - 2.4 mg/dL   HEPATIC FUNCTION PANEL    Collection Time: 04/08/18  5:14 AM   Result Value Ref Range    Protein, total 6.5 6.3 - 8.2 g/dL    Albumin 2.6 (L) 3.5 - 5.0 g/dL    Globulin 3.9 (H) 2.3 - 3.5 g/dL    A-G Ratio 0.7 (L) 1.2 - 3.5      Bilirubin, total 0.3 0.2 - 1.1 MG/DL    Bilirubin, direct 0.1 <0.4 MG/DL    Alk.  phosphatase 58 50 - 136 U/L    AST (SGOT) 15 15 - 37 U/L    ALT (SGPT) 120 (H) 12 - 65 U/L   METABOLIC PANEL, BASIC    Collection Time: 04/08/18  5:14 AM   Result Value Ref Range    Sodium 142 136 - 145 mmol/L    Potassium 4.1 3.5 - 5.1 mmol/L    Chloride 104 98 - 107 mmol/L    CO2 27 21 - 32 mmol/L    Anion gap 11 7 - 16 mmol/L    Glucose 91 65 - 100 mg/dL    BUN 47 (H) 6 - 23 MG/DL    Creatinine 5.58 (H) 0.8 - 1.5 MG/DL    GFR est AA 15 (L) >60 ml/min/1.73m2    GFR est non-AA 12 (L) >60 ml/min/1.73m2    Calcium 9.2 8.3 - 10.4 MG/DL        All Micro Results     None          Current Meds:  Current Facility-Administered Medications   Medication Dose Route Frequency    lidocaine (XYLOCAINE) 20 mg/mL (2 %) injection  mg  1-20 mL SubCUTAneous Multiple    fentaNYL citrate (PF) injection  mcg   mcg IntraVENous Multiple    midazolam (VERSED) injection 0.5-2 mg  0.5-2 mg IntraVENous Rad Multiple    oxyCODONE IR (ROXICODONE) tablet 10 mg  10 mg Oral Q4H PRN    sertraline (ZOLOFT) tablet 25 mg  25 mg Oral DAILY    amLODIPine (NORVASC) tablet 10 mg  10 mg Oral DAILY    diphenhydrAMINE (BENADRYL) capsule 25 mg  25 mg Oral QHS PRN    pantoprazole (PROTONIX) tablet 40 mg  40 mg Oral ACB    traMADol (ULTRAM) tablet 50 mg  50 mg Oral Q6H PRN    calcium carbonate (TUMS) chewable tablet 400 mg [elemental]  400 mg Oral TID PRN    sodium chloride (NS) flush 5-10 mL  5-10 mL IntraVENous Q8H    sodium chloride (NS) flush 5-10 mL  5-10 mL IntraVENous PRN    acetaminophen (TYLENOL) tablet 650 mg  650 mg Oral Q4H PRN    ondansetron (ZOFRAN) injection 4 mg  4 mg IntraVENous Q4H PRN    heparin (porcine) injection 5,000 Units  5,000 Units SubCUTAneous Q8H       Other Studies (last 24 hours):  No results found.     Assessment and Plan:     Hospital Problems as of 4/3/2018  Date Reviewed: 3/29/2018          Codes Class Noted - Resolved POA    Chronic hepatitis C without hepatic coma ([facility] ICD-10-CM: B18.2  ICD-9-CM: 070.54  3/29/2018 - Present Unknown        Hepatitis ICD-10-CM: K75.9  ICD-9-CM: 573.3  3/27/2018 - Present Yes        Rhabdomyolysis ICD-10-CM: M62.82  ICD-9-CM: 728.88  3/27/2018 - Present Yes        * (Principal)ARF (acute renal failure) ([facility] ICD-10-CM: N17.9  ICD-9-CM: 584.9  3/26/2018 - Present Yes        Heroin abuse ICD-10-CM: F11.10  ICD-9-CM: 305.50  3/26/2018 - Present Yes        Tobacco abuse ICD-10-CM: Z72.0  ICD-9-CM: 305.1  3/26/2018 - Present Yes    GERD          PLAN:    AISSATOU- on dialysis-had biopsy on 4/6/18  gerd- on protonix  Hept c  Poly substance abuse  Tobacco abuse  htn- prn hydralazine,amlodipine to 10 mg  On zoloft    DC planning/Dispo:    DVT ppx:  heparin    Signed:  Miracle Garzon MD

## 2018-04-08 NOTE — PROGRESS NOTES
Admit Date: 3/26/2018      Subjective:          Review of Systems  Cardio-vascular: no chest pain, no SOB  GI: no N/V/D  : no dysuria, no hematuria    Objective:     Patient Vitals for the past 8 hrs:   BP Temp Pulse Resp SpO2 Weight   04/08/18 1040 124/84 98.3 °F (36.8 °C) 86 20 96 % -   04/08/18 0735 112/67 98.1 °F (36.7 °C) 69 18 96 % -   04/08/18 0518 134/87 - 70 17 95 % -   04/08/18 0252 - - - - - 73.1 kg (161 lb 3.2 oz)     04/08 0701 - 04/08 1900  In: 1156 [P.O.:1156]  Out: -       Physical Exam:   Lungs: clear  CV: RR, no JVD  Abdomen: soft, not tender, no rebound. Ext: no edema          Data Review   Recent Results (from the past 8 hour(s))   MAGNESIUM    Collection Time: 04/08/18  5:14 AM   Result Value Ref Range    Magnesium 1.9 1.8 - 2.4 mg/dL   HEPATIC FUNCTION PANEL    Collection Time: 04/08/18  5:14 AM   Result Value Ref Range    Protein, total 6.5 6.3 - 8.2 g/dL    Albumin 2.6 (L) 3.5 - 5.0 g/dL    Globulin 3.9 (H) 2.3 - 3.5 g/dL    A-G Ratio 0.7 (L) 1.2 - 3.5      Bilirubin, total 0.3 0.2 - 1.1 MG/DL    Bilirubin, direct 0.1 <0.4 MG/DL    Alk.  phosphatase 58 50 - 136 U/L    AST (SGOT) 15 15 - 37 U/L    ALT (SGPT) 120 (H) 12 - 65 U/L   METABOLIC PANEL, BASIC    Collection Time: 04/08/18  5:14 AM   Result Value Ref Range    Sodium 142 136 - 145 mmol/L    Potassium 4.1 3.5 - 5.1 mmol/L    Chloride 104 98 - 107 mmol/L    CO2 27 21 - 32 mmol/L    Anion gap 11 7 - 16 mmol/L    Glucose 91 65 - 100 mg/dL    BUN 47 (H) 6 - 23 MG/DL    Creatinine 5.58 (H) 0.8 - 1.5 MG/DL    GFR est AA 15 (L) >60 ml/min/1.73m2    GFR est non-AA 12 (L) >60 ml/min/1.73m2    Calcium 9.2 8.3 - 10.4 MG/DL           Assessment:     Principal Problem:    ARF (acute renal failure) (HCC) (3/26/2018)    Active Problems:    Heroin abuse (3/26/2018)      Tobacco abuse (3/26/2018)      Hepatitis (3/27/2018)      Rhabdomyolysis (3/27/2018)      Chronic hepatitis C without hepatic coma (HCC) (3/29/2018)      GERD (gastroesophageal reflux disease) (4/3/2018)        Plan:     F/u I/O and lab in .  HD in     Margaret Calderon MD

## 2018-04-09 LAB
ALBUMIN SERPL-MCNC: 2.7 G/DL (ref 3.5–5)
ALBUMIN/GLOB SERPL: 0.7 {RATIO} (ref 1.2–3.5)
ALP SERPL-CCNC: 66 U/L (ref 50–136)
ALT SERPL-CCNC: 106 U/L (ref 12–65)
ANION GAP SERPL CALC-SCNC: 10 MMOL/L (ref 7–16)
AST SERPL-CCNC: 16 U/L (ref 15–37)
BILIRUB DIRECT SERPL-MCNC: <0.1 MG/DL
BILIRUB SERPL-MCNC: 0.2 MG/DL (ref 0.2–1.1)
BUN SERPL-MCNC: 44 MG/DL (ref 6–23)
CALCIUM SERPL-MCNC: 9.3 MG/DL (ref 8.3–10.4)
CHLORIDE SERPL-SCNC: 106 MMOL/L (ref 98–107)
CO2 SERPL-SCNC: 26 MMOL/L (ref 21–32)
CREAT SERPL-MCNC: 3.73 MG/DL (ref 0.8–1.5)
GLOBULIN SER CALC-MCNC: 4.1 G/DL (ref 2.3–3.5)
GLUCOSE SERPL-MCNC: 94 MG/DL (ref 65–100)
MAGNESIUM SERPL-MCNC: 1.8 MG/DL (ref 1.8–2.4)
POTASSIUM SERPL-SCNC: 4 MMOL/L (ref 3.5–5.1)
PROT SERPL-MCNC: 6.8 G/DL (ref 6.3–8.2)
SODIUM SERPL-SCNC: 142 MMOL/L (ref 136–145)

## 2018-04-09 PROCEDURE — 74011250637 HC RX REV CODE- 250/637: Performed by: INTERNAL MEDICINE

## 2018-04-09 PROCEDURE — 74011250637 HC RX REV CODE- 250/637: Performed by: FAMILY MEDICINE

## 2018-04-09 PROCEDURE — 80048 BASIC METABOLIC PNL TOTAL CA: CPT | Performed by: INTERNAL MEDICINE

## 2018-04-09 PROCEDURE — 36415 COLL VENOUS BLD VENIPUNCTURE: CPT | Performed by: INTERNAL MEDICINE

## 2018-04-09 PROCEDURE — 83735 ASSAY OF MAGNESIUM: CPT | Performed by: INTERNAL MEDICINE

## 2018-04-09 PROCEDURE — 65270000029 HC RM PRIVATE

## 2018-04-09 PROCEDURE — 80076 HEPATIC FUNCTION PANEL: CPT | Performed by: INTERNAL MEDICINE

## 2018-04-09 PROCEDURE — 90935 HEMODIALYSIS ONE EVALUATION: CPT

## 2018-04-09 PROCEDURE — 74011250636 HC RX REV CODE- 250/636: Performed by: INTERNAL MEDICINE

## 2018-04-09 RX ADMIN — ACETAMINOPHEN 650 MG: 500 TABLET, FILM COATED ORAL at 05:24

## 2018-04-09 RX ADMIN — PANTOPRAZOLE SODIUM 40 MG: 40 TABLET, DELAYED RELEASE ORAL at 05:24

## 2018-04-09 RX ADMIN — Medication 10 ML: at 05:26

## 2018-04-09 RX ADMIN — HEPARIN SODIUM 5000 UNITS: 5000 INJECTION, SOLUTION INTRAVENOUS; SUBCUTANEOUS at 21:42

## 2018-04-09 RX ADMIN — Medication 10 ML: at 21:19

## 2018-04-09 RX ADMIN — ACETAMINOPHEN 650 MG: 500 TABLET, FILM COATED ORAL at 19:38

## 2018-04-09 RX ADMIN — SERTRALINE HYDROCHLORIDE 25 MG: 50 TABLET ORAL at 09:00

## 2018-04-09 RX ADMIN — HEPARIN SODIUM 5000 UNITS: 5000 INJECTION, SOLUTION INTRAVENOUS; SUBCUTANEOUS at 13:24

## 2018-04-09 RX ADMIN — Medication 10 ML: at 13:22

## 2018-04-09 NOTE — PROGRESS NOTES
Interdisciplinary Rounds completed 04/09/2018. Nursing, Case Management, Physician and PT present. Plan of care reviewed and updated. Encouraged pt to ambulate. Pt had questions about vocational rehab.   This was addressed by  GRACE

## 2018-04-09 NOTE — PROGRESS NOTES
Problem: Nutrition Deficit  Goal: *Optimize nutritional status  Nutrition Follow Up Note: Day 14  Assessment  Diet order(s): Renal, Double Portions, Whole milk with breakfast daily  Food,Nutrition, and Pertinent History: Patient reports that his appetite is doing well. Asking for side salads with lunch and dinner daily. Denies any po difficulties at this time. The patient has no questions or concerns regarding nutrition at this time. RD is unsure if dialysis is going to be long term or if acute renal failure will resolve soon. The patient declines renal education at this time but has many questions regarding restrictions. Anthropometrics: Height: 5' 7\", Weight Source: Bed, Weight: 74.8 kg  Macronutrient Needs:  · EER:  6743-9481 kcal /day (20-25 kcal/kg actual BW)  · EPR:  81-94 grams protein/day (1.2-1.4 grams/kg IBW) (currently receiving HD)  Intake/Comparative Standards:  Average intake for past 7 day(s)/9 recorded meal(s): 100%. This potentially meets ~100% of kcal and ~100% of protein needs     Intervention:   Meals and snacks: Continue current diet. Add side salad to lunch and dinner  Discharge nutrition plan: Will monitor if long term dialysis is needed, if needed recommend renal education.     Ginger Mckeon [address], [address] [place] [address],    [phone]

## 2018-04-09 NOTE — DIALYSIS
Hemodialysis treatment initiated using right perm catheter by Vonnie Polanco RN. Aspirated and flushed both ports without problem. Dressing clean, dry and intact. Pt alert and VS stable. Machine settings per MD order. Will monitor during treatment.

## 2018-04-09 NOTE — PROGRESS NOTES
Pt found with dsg completely off and open.   Redressed per hospital protocol using sterile technique

## 2018-04-09 NOTE — PROGRESS NOTES
Problem: Falls - Risk of  Goal: *Absence of Falls  Document Veronica Fall Risk and appropriate interventions in the flowsheet.    Fall Risk Interventions:  Mobility Interventions: Patient to call before getting OOB    Mentation Interventions: Adequate sleep, hydration, pain control    Medication Interventions: Evaluate medications/consider consulting pharmacy, Bed/chair exit alarm, Patient to call before getting OOB, Teach patient to arise slowly    Elimination Interventions: Call light in reach    History of Falls Interventions: Evaluate medications/consider consulting pharmacy

## 2018-04-09 NOTE — PROGRESS NOTES
Call placed to [facility] to confirm that patient can return to facility in [place] on 4/20/18. Patient cannot return any sooner to facility. Spoke with Ean Warren at facility and she stated they help patients get full time jobs, transport them to and from their jobs. Also asked if they could help transport patient to and from dialysis. They stated they could likely transport patient to all appointments, however there could be times they may not be able too. CM provided patient with [employer] application and patient stated today he doesn't feel he needs this at this time. CM to call facility back 24-48 hrs prior to discharge so they can hold bed for patient. Awaiting dialysis slot at this time.

## 2018-04-09 NOTE — PROGRESS NOTES
Hospitalist Progress Note     Admit Date:  3/26/2018  8:10 AM   Name:  Alok Bowman   Age:  26 y.o.  :  1985   MRN:  [id number]   PCP:  None  Treatment Team: Attending Provider: Leroy Martinez MD; Care Manager: Kristal Ca RN; Consulting Provider: Herbie Campuzano MD    Subjective:   Mr Sherif Wilkes is a 26 yom with a hx of iv drug abuse who was admitted for nausea and vomiting. He Stated symptoms began after injecting heroin. Patient noted to have AISSATOU with Scr of 4.92, oliguric. Despite hydration, his kidney status worsened and he was placed under HD. Work up sent, he tested positive for Hep C, and low complements levels. Has a family history of SLE on his mother. Renal is planning a biopsy this week. Since patient is self-paid, case is difficult.  on board for placement. Pt was continued on dialysis. Had renal biopsy on 18. Was started on Zoloft during the stay.     18  Improving creat on dialysis  No complaints    Objective:     Patient Vitals for the past 24 hrs:   Temp Pulse Resp BP SpO2   18 1124 98.4 °F (36.9 °C) 84 18 133/88 94 %   18 1005 - 76 - 116/83 -   18 0958 - 74 - 118/81 -   18 0924 - 65 - 115/85 -   18 0855 - 67 - 114/82 -   18 0827 - 68 - 116/81 -   18 0758 - 67 - 115/74 -   18 0736 - 66 - 125/84 -   18 0659 - 60 - 112/85 -   18 0341 99.8 °F (37.7 °C) 71 18 113/76 97 %   18 0328 98.1 °F (36.7 °C) 88 18 144/80 92 %   18 2317 98.2 °F (36.8 °C) 81 18 143/84 96 %   18 1932 98.2 °F (36.8 °C) 80 18 129/77 97 %   18 1440 98.2 °F (36.8 °C) 76 22 123/80 97 %     Oxygen Therapy  O2 Sat (%): 94 % (18 1124)  Pulse via Oximetry: 72 beats per minute (18 1240)  O2 Device: CO2 nasal cannula (18 1201)  O2 Flow Rate (L/min): 3 l/min (18 1201)  ETCO2 (mmHg): 0.4 mmHg (18 1201)    Intake/Output Summary (Last 24 hours) at 18 1138  Last data filed at 18 1005   Gross per 24 hour   Intake              480 ml   Output             1200 ml   Net             -720 ml         General:    Well nourished. Alert.    heent- normal  CV:   RRR. No murmur, rub, or gallop. Lungs:   Clear to auscultation bilaterally. No wheezing, rhonchi, or rales. Temporary dialysis cath rt chest  Cns- no focal neurological deficits  Abdomen:   Soft, nontender, nondistended. nontender biopsy site  Extremities: Warm and dry. No cyanosis or edema. Skin:     No rashes or jaundice. Data Review:  I have reviewed all labs, meds, telemetry events, and studies from the last 24 hours. Recent Results (from the past 24 hour(s))   MAGNESIUM    Collection Time: 04/09/18  5:20 AM   Result Value Ref Range    Magnesium 1.8 1.8 - 2.4 mg/dL   HEPATIC FUNCTION PANEL    Collection Time: 04/09/18  5:20 AM   Result Value Ref Range    Protein, total 6.8 6.3 - 8.2 g/dL    Albumin 2.7 (L) 3.5 - 5.0 g/dL    Globulin 4.1 (H) 2.3 - 3.5 g/dL    A-G Ratio 0.7 (L) 1.2 - 3.5      Bilirubin, total 0.2 0.2 - 1.1 MG/DL    Bilirubin, direct <0.1 <0.4 MG/DL    Alk.  phosphatase 66 50 - 136 U/L    AST (SGOT) 16 15 - 37 U/L    ALT (SGPT) 106 (H) 12 - 65 U/L   METABOLIC PANEL, BASIC    Collection Time: 04/09/18  5:20 AM   Result Value Ref Range    Sodium 142 136 - 145 mmol/L    Potassium 4.0 3.5 - 5.1 mmol/L    Chloride 106 98 - 107 mmol/L    CO2 26 21 - 32 mmol/L    Anion gap 10 7 - 16 mmol/L    Glucose 94 65 - 100 mg/dL    BUN 44 (H) 6 - 23 MG/DL    Creatinine 3.73 (H) 0.8 - 1.5 MG/DL    GFR est AA 24 (L) >60 ml/min/1.73m2    GFR est non-AA 20 (L) >60 ml/min/1.73m2    Calcium 9.3 8.3 - 10.4 MG/DL        All Micro Results     None          Current Meds:  Current Facility-Administered Medications   Medication Dose Route Frequency    lidocaine (XYLOCAINE) 20 mg/mL (2 %) injection  mg  1-20 mL SubCUTAneous Multiple    fentaNYL citrate (PF) injection  mcg   mcg IntraVENous Multiple    midazolam (VERSED) injection 0.5-2 mg  0.5-2 mg IntraVENous Rad Multiple    oxyCODONE IR (ROXICODONE) tablet 10 mg  10 mg Oral Q4H PRN    sertraline (ZOLOFT) tablet 25 mg  25 mg Oral DAILY    amLODIPine (NORVASC) tablet 10 mg  10 mg Oral DAILY    diphenhydrAMINE (BENADRYL) capsule 25 mg  25 mg Oral QHS PRN    pantoprazole (PROTONIX) tablet 40 mg  40 mg Oral ACB    traMADol (ULTRAM) tablet 50 mg  50 mg Oral Q6H PRN    calcium carbonate (TUMS) chewable tablet 400 mg [elemental]  400 mg Oral TID PRN    sodium chloride (NS) flush 5-10 mL  5-10 mL IntraVENous Q8H    sodium chloride (NS) flush 5-10 mL  5-10 mL IntraVENous PRN    acetaminophen (TYLENOL) tablet 650 mg  650 mg Oral Q4H PRN    ondansetron (ZOFRAN) injection 4 mg  4 mg IntraVENous Q4H PRN    heparin (porcine) injection 5,000 Units  5,000 Units SubCUTAneous Q8H       Other Studies (last 24 hours):  No results found.     Assessment and Plan:     Hospital Problems as of 4/3/2018  Date Reviewed: 3/29/2018          Codes Class Noted - Resolved POA    Chronic hepatitis C without hepatic coma ([facility] ICD-10-CM: B18.2  ICD-9-CM: 070.54  3/29/2018 - Present Unknown        Hepatitis ICD-10-CM: K75.9  ICD-9-CM: 573.3  3/27/2018 - Present Yes        Rhabdomyolysis ICD-10-CM: M62.82  ICD-9-CM: 728.88  3/27/2018 - Present Yes        * (Principal)ARF (acute renal failure) ([facility] ICD-10-CM: N17.9  ICD-9-CM: 584.9  3/26/2018 - Present Yes        Heroin abuse ICD-10-CM: F11.10  ICD-9-CM: 305.50  3/26/2018 - Present Yes        Tobacco abuse ICD-10-CM: Z72.0  ICD-9-CM: 305.1  3/26/2018 - Present Yes    GERD          PLAN:    AISSATOU- on dialysis-had biopsy on 4/6/18- improving creatinine  gerd- on protonix  Hept c  Poly substance abuse  Tobacco abuse  htn- prn hydralazine,amlodipine to 10 mg  On zoloft    DC planning/Dispo:    DVT ppx:  heparin    Signed:  Marylen Hard, MD

## 2018-04-09 NOTE — PROGRESS NOTES
Supervisor signed and faxed contract to [facility]. Call placed to Verito Henderson, liaison to confirm.

## 2018-04-09 NOTE — PROGRESS NOTES
[facility] PROGRESS NOTE    Follow up for: AISSATOU    Subjective:   Patient seen and examined. Chart, notes, labs, imaging, results all reviewed. Patient seen on hemodialysis. Goal UF 0.5 kg. Tolerating well. Labs look better this am and making a lot of urine per patient. UOP not documented in chart. /82. HR 67. Denies sob,cp, n/v/d/c, abd pain. Tolerating HD well.      ROS:  Gen - no fever, no chills, appetite okay  CV - no chest pain, no orthopnea  Lung - no shortness of breath, no cough  Abd - no tenderness, no nausea, no vomiting  Ext - no edema    Objective:   Exam:  Vitals:    04/09/18 0736 04/09/18 0758 04/09/18 0827 04/09/18 0855   BP: 125/84 115/74 116/81 114/82   Pulse: 66 67 68 67   Resp:       Temp:       SpO2:       Weight:       Height:             Intake/Output Summary (Last 24 hours) at 04/09/18 0904  Last data filed at 04/09/18 0901   Gross per 24 hour   Intake             1280 ml   Output              700 ml   Net              580 ml       Current Facility-Administered Medications   Medication Dose Route Frequency    lidocaine (XYLOCAINE) 20 mg/mL (2 %) injection  mg  1-20 mL SubCUTAneous Multiple    fentaNYL citrate (PF) injection  mcg   mcg IntraVENous Multiple    midazolam (VERSED) injection 0.5-2 mg  0.5-2 mg IntraVENous Rad Multiple    oxyCODONE IR (ROXICODONE) tablet 10 mg  10 mg Oral Q4H PRN    sertraline (ZOLOFT) tablet 25 mg  25 mg Oral DAILY    amLODIPine (NORVASC) tablet 10 mg  10 mg Oral DAILY    diphenhydrAMINE (BENADRYL) capsule 25 mg  25 mg Oral QHS PRN    pantoprazole (PROTONIX) tablet 40 mg  40 mg Oral ACB    traMADol (ULTRAM) tablet 50 mg  50 mg Oral Q6H PRN    calcium carbonate (TUMS) chewable tablet 400 mg [elemental]  400 mg Oral TID PRN    sodium chloride (NS) flush 5-10 mL  5-10 mL IntraVENous Q8H    sodium chloride (NS) flush 5-10 mL  5-10 mL IntraVENous PRN    acetaminophen (TYLENOL) tablet 650 mg  650 mg Oral Q4H PRN    ondansetron [facility]) injection 4 mg  4 mg IntraVENous Q4H PRN    heparin (porcine) injection 5,000 Units  5,000 Units SubCUTAneous Q8H       EXAM  GEN - Alert, oriented, in no distress  CV - S1, S2, RRR, no rub, murmur, or gallop  Lung - clear to auscultation bilaterally  Abd - soft, nontender, BS present  Ext - no edema    No results for input(s): WBC, HGB, HCT, PLT, HGBEXT, HCTEXT, PLTEXT in the last 72 hours. Recent Labs      04/09/18   0520  04/08/18   0514  04/07/18   0541   NA  142  142   --    K  4.0  4.1   --    CL  106  104   --    CO2  26  27   --    BUN  44*  47*   --    CREA  3.73*  5.58*   --    CA  9.3  9.2   --    GLU  94  91   --    MG  1.8  1.9  2.0       Assessment and Plan:   AISSATOU unspecified - heroin use, methamphetamine use, n/v, low bp, goody powders  - presumed glomerular in origin given 6 gms of nephrotic-range proteinuria, active urine sediment, low complements, and hepatitis.  Renal imaging unremarkable.  ?underlying FSGS or hepatitis C-mediated MPGN (fits with the urine sediment and complement levels )  - s/p renal biopsy 4/6/18 - biopsy results pending  - HD dependent since 3/30  - HD today  - initially anuric-->uop up and creatinine down without HD. Possibly some renal recovery.  Will hold HD after today and follow labs/uop    Heroin use  Hepatitis C      Pervis SHELTON Watkins

## 2018-04-09 NOTE — PROGRESS NOTES
Hourly rounds done. Pt c/o headache, medicated per MAR. Denies nausea, vomiting. Voiding this shift, no BM. HD this am. All needs met at this time.

## 2018-04-10 LAB
ALBUMIN SERPL-MCNC: 3.1 G/DL (ref 3.5–5)
ALBUMIN/GLOB SERPL: 0.7 {RATIO} (ref 1.2–3.5)
ALP SERPL-CCNC: 67 U/L (ref 50–136)
ALT SERPL-CCNC: 100 U/L (ref 12–65)
ANION GAP SERPL CALC-SCNC: 10 MMOL/L (ref 7–16)
AST SERPL-CCNC: 16 U/L (ref 15–37)
BILIRUB DIRECT SERPL-MCNC: 0.1 MG/DL
BILIRUB SERPL-MCNC: 0.3 MG/DL (ref 0.2–1.1)
BUN SERPL-MCNC: 34 MG/DL (ref 6–23)
CALCIUM SERPL-MCNC: 9.2 MG/DL (ref 8.3–10.4)
CHLORIDE SERPL-SCNC: 106 MMOL/L (ref 98–107)
CO2 SERPL-SCNC: 27 MMOL/L (ref 21–32)
CREAT SERPL-MCNC: 2.4 MG/DL (ref 0.8–1.5)
ERYTHROCYTE [DISTWIDTH] IN BLOOD BY AUTOMATED COUNT: 12.6 % (ref 11.9–14.6)
GLOBULIN SER CALC-MCNC: 4.2 G/DL (ref 2.3–3.5)
GLUCOSE SERPL-MCNC: 89 MG/DL (ref 65–100)
HCT VFR BLD AUTO: 36.9 % (ref 41.1–50.3)
HGB BLD-MCNC: 12.6 G/DL (ref 13.6–17.2)
MAGNESIUM SERPL-MCNC: 1.9 MG/DL (ref 1.8–2.4)
MCH RBC QN AUTO: 30.7 PG (ref 26.1–32.9)
MCHC RBC AUTO-ENTMCNC: 34.1 G/DL (ref 31.4–35)
MCV RBC AUTO: 89.8 FL (ref 79.6–97.8)
PLATELET # BLD AUTO: 328 K/UL (ref 150–450)
PMV BLD AUTO: 8.4 FL (ref 10.8–14.1)
POTASSIUM SERPL-SCNC: 4.2 MMOL/L (ref 3.5–5.1)
PROT SERPL-MCNC: 7.3 G/DL (ref 6.3–8.2)
RBC # BLD AUTO: 4.11 M/UL (ref 4.23–5.67)
SODIUM SERPL-SCNC: 143 MMOL/L (ref 136–145)
WBC # BLD AUTO: 6 K/UL (ref 4.3–11.1)

## 2018-04-10 PROCEDURE — 80076 HEPATIC FUNCTION PANEL: CPT | Performed by: INTERNAL MEDICINE

## 2018-04-10 PROCEDURE — 36415 COLL VENOUS BLD VENIPUNCTURE: CPT | Performed by: INTERNAL MEDICINE

## 2018-04-10 PROCEDURE — 74011250637 HC RX REV CODE- 250/637: Performed by: INTERNAL MEDICINE

## 2018-04-10 PROCEDURE — 65270000029 HC RM PRIVATE

## 2018-04-10 PROCEDURE — 74011250636 HC RX REV CODE- 250/636: Performed by: INTERNAL MEDICINE

## 2018-04-10 PROCEDURE — 83735 ASSAY OF MAGNESIUM: CPT | Performed by: INTERNAL MEDICINE

## 2018-04-10 PROCEDURE — 74011250637 HC RX REV CODE- 250/637: Performed by: FAMILY MEDICINE

## 2018-04-10 PROCEDURE — 85027 COMPLETE CBC AUTOMATED: CPT | Performed by: INTERNAL MEDICINE

## 2018-04-10 PROCEDURE — 80048 BASIC METABOLIC PNL TOTAL CA: CPT | Performed by: INTERNAL MEDICINE

## 2018-04-10 RX ADMIN — HEPARIN SODIUM 5000 UNITS: 5000 INJECTION, SOLUTION INTRAVENOUS; SUBCUTANEOUS at 13:30

## 2018-04-10 RX ADMIN — Medication 10 ML: at 21:57

## 2018-04-10 RX ADMIN — ACETAMINOPHEN 650 MG: 500 TABLET, FILM COATED ORAL at 05:50

## 2018-04-10 RX ADMIN — Medication 10 ML: at 13:48

## 2018-04-10 RX ADMIN — HEPARIN SODIUM 5000 UNITS: 5000 INJECTION, SOLUTION INTRAVENOUS; SUBCUTANEOUS at 21:56

## 2018-04-10 RX ADMIN — AMLODIPINE BESYLATE 10 MG: 10 TABLET ORAL at 09:37

## 2018-04-10 RX ADMIN — PANTOPRAZOLE SODIUM 40 MG: 40 TABLET, DELAYED RELEASE ORAL at 05:34

## 2018-04-10 RX ADMIN — HEPARIN SODIUM 5000 UNITS: 5000 INJECTION, SOLUTION INTRAVENOUS; SUBCUTANEOUS at 05:35

## 2018-04-10 RX ADMIN — SERTRALINE HYDROCHLORIDE 25 MG: 50 TABLET ORAL at 09:37

## 2018-04-10 RX ADMIN — Medication 10 ML: at 05:37

## 2018-04-10 NOTE — PROGRESS NOTES
[facility]        Subjective: Paul   No new complaints    Review of Systems -   General ROS: negative for - fever, chills  Respiratory ROS: no SOB, cough, HERNANDEZ  Cardiovascular ROS: no CP, palpitations  Gastrointestinal ROS: no N&V, abdominal pain, diarrhea  Genito-Urinary ROS: no difficulty voiding, dysuria  Neurological ROS: no seizures, focal weekness        Objective:    Vitals:    04/09/18 1903 04/09/18 2301 04/10/18 0432 04/10/18 0737   BP: 125/84 122/80  122/85   Pulse: 78 77  78   Resp: 18 18 18   Temp: 98.2 °F (36.8 °C) 98.1 °F (36.7 °C)  97.5 °F (36.4 °C)   SpO2: 99% 93%  95%   Weight:   77.2 kg (170 lb 4.8 oz)    Height:           PE  Gen: in no acute distress  CV:reg rate  Chest:clear  Abd: soft  Ext/Access: rt IJ tunneled HD cath ok       . LAB  Recent Labs      04/10/18   0548   WBC  6.0   HGB  12.6*   HCT  36.9*   PLT  328     Recent Labs      04/10/18   0548  04/09/18   0520  04/08/18   0514   NA  143  142  142   K  4.2  4.0  4.1   CL  106  106  104   CO2  27  26  27   GLU  89  94  91   BUN  34*  44*  47*   CREA  2.40*  3.73*  5.58*   MG  1.9  1.8  1.9   CA  9.2  9.3  9.2   ALB  3.1*  2.7*  2.6*   TBILI  0.3  0.2  0.3   ALT  100*  106*  120*   SGOT  16  16  15           Radiology    A/P:   Patient Active Problem List   Diagnosis Code    ARF (acute renal failure) (HCC) N17.9    Heroin abuse F11.10    Tobacco abuse Z72.0    Hepatitis K75.9    Rhabdomyolysis M62.82    Chronic hepatitis C without hepatic coma (HCC) B18.2    GERD (gastroesophageal reflux disease) K21.9       PAUL - will hold dialysis for now and decide day by day  Renal Biopsy - awaiting results.        Alexander Patel MD

## 2018-04-10 NOTE — PROGRESS NOTES
Pt was calm and slept all night. Hourly rounds were done and pt needs were met. Report will be given to day shift nurse and will continue to monitor.

## 2018-04-10 NOTE — PROGRESS NOTES
Financial approval received for OP dialysis at [facility]. Signed agreement faxed to [address], [phone].  Jose Valdovinos RN

## 2018-04-10 NOTE — PROGRESS NOTES
Hourly rounds completed throughout this shift. Pt was up and ambulated in the hallway. Pt reports he voided 6 times today in the bathroom. Nurse encouraged pt to start voiding in his urinal to be able to measure output. Pt verbalized understanding. Pt resting in bed; denies needs at this time. Will continue to monitor and report to oncoming night shift nurse.

## 2018-04-10 NOTE — PROGRESS NOTES
Hospitalist Progress Note     Admit Date:  3/26/2018  8:10 AM   Name:  Carmela Hussein   Age:  26 y.o.  :  1985   MRN:  [id number]   PCP:  None  Treatment Team: Attending Provider: Lala Riddle MD; Care Manager: Jazlyn Lowry RN; Consulting Provider: Luis Baez MD    Subjective:   Mr Kaushal Daly is a 25 yo male with a hx of iv drug abuse who was admitted for nausea and vomiting. He Stated symptoms began after injecting heroin. Patient noted to have AISSATOU with Scr of 4.92, oliguric. Despite hydration, his kidney status worsened and he was placed under HD. He tested positive for Hep C, and low complements levels. Has a family history of SLE on his mother. HD was started this admission. He had HD yesterday. Had renal biopsy on 18. Was started on Zoloft during the stay. Reported making \"good amount\" of urine. Objective:     Patient Vitals for the past 24 hrs:   Temp Pulse Resp BP SpO2   04/10/18 1145 98.2 °F (36.8 °C) 83 18 121/81 97 %   04/10/18 0737 97.5 °F (36.4 °C) 78 18 122/85 95 %   18 2301 98.1 °F (36.7 °C) 77 18 122/80 93 %   18 1903 98.2 °F (36.8 °C) 78 18 125/84 99 %   18 1545 98.3 °F (36.8 °C) 78 18 137/86 98 %     Oxygen Therapy  O2 Sat (%): 97 % (04/10/18 1145)  Pulse via Oximetry: 72 beats per minute (18 1240)  O2 Device: Room air (04/10/18 1145)  O2 Flow Rate (L/min): 3 l/min (18 1201)  ETCO2 (mmHg): 0.4 mmHg (18 1201)  No intake or output data in the 24 hours ending 04/10/18 1307      General:    Well nourished. Alert. Lying in bed without respiratory distress. Hemodialysis catheter in place. No cellulitis surrounding the exit site. heent-   normal  CV:   RRR. No murmur, rub, or gallop. Lungs:   Clear to auscultation bilaterally. No wheezing, rhonchi, or rales. Temporary dialysis cath rt chest  Cns- no focal neurological deficits  Abdomen:   Soft, nontender, nondistended. nontender biopsy site  Extremities: Warm and dry.   No cyanosis or edema. Skin:     No rashes or jaundice. Data Review:  I have reviewed all labs, meds, telemetry events, and studies from the last 24 hours. Recent Results (from the past 24 hour(s))   MAGNESIUM    Collection Time: 04/10/18  5:48 AM   Result Value Ref Range    Magnesium 1.9 1.8 - 2.4 mg/dL   HEPATIC FUNCTION PANEL    Collection Time: 04/10/18  5:48 AM   Result Value Ref Range    Protein, total 7.3 6.3 - 8.2 g/dL    Albumin 3.1 (L) 3.5 - 5.0 g/dL    Globulin 4.2 (H) 2.3 - 3.5 g/dL    A-G Ratio 0.7 (L) 1.2 - 3.5      Bilirubin, total 0.3 0.2 - 1.1 MG/DL    Bilirubin, direct 0.1 <0.4 MG/DL    Alk.  phosphatase 67 50 - 136 U/L    AST (SGOT) 16 15 - 37 U/L    ALT (SGPT) 100 (H) 12 - 65 U/L   CBC W/O DIFF    Collection Time: 04/10/18  5:48 AM   Result Value Ref Range    WBC 6.0 4.3 - 11.1 K/uL    RBC 4.11 (L) 4.23 - 5.67 M/uL    HGB 12.6 (L) 13.6 - 17.2 g/dL    HCT 36.9 (L) 41.1 - 50.3 %    MCV 89.8 79.6 - 97.8 FL    MCH 30.7 26.1 - 32.9 PG    MCHC 34.1 31.4 - 35.0 g/dL    RDW 12.6 11.9 - 14.6 %    PLATELET 651 166 - 403 K/uL    MPV 8.4 (L) 10.8 - 85.2 FL   METABOLIC PANEL, BASIC    Collection Time: 04/10/18  5:48 AM   Result Value Ref Range    Sodium 143 136 - 145 mmol/L    Potassium 4.2 3.5 - 5.1 mmol/L    Chloride 106 98 - 107 mmol/L    CO2 27 21 - 32 mmol/L    Anion gap 10 7 - 16 mmol/L    Glucose 89 65 - 100 mg/dL    BUN 34 (H) 6 - 23 MG/DL    Creatinine 2.40 (H) 0.8 - 1.5 MG/DL    GFR est AA 40 (L) >60 ml/min/1.73m2    GFR est non-AA 33 (L) >60 ml/min/1.73m2    Calcium 9.2 8.3 - 10.4 MG/DL        All Micro Results     None          Current Meds:  Current Facility-Administered Medications   Medication Dose Route Frequency    lidocaine (XYLOCAINE) 20 mg/mL (2 %) injection  mg  1-20 mL SubCUTAneous Multiple    fentaNYL citrate (PF) injection  mcg   mcg IntraVENous Multiple    midazolam (VERSED) injection 0.5-2 mg  0.5-2 mg IntraVENous Rad Multiple    sertraline (ZOLOFT) tablet 25 mg  25 mg Oral DAILY    amLODIPine (NORVASC) tablet 10 mg  10 mg Oral DAILY    diphenhydrAMINE (BENADRYL) capsule 25 mg  25 mg Oral QHS PRN    pantoprazole (PROTONIX) tablet 40 mg  40 mg Oral ACB    traMADol (ULTRAM) tablet 50 mg  50 mg Oral Q6H PRN    calcium carbonate (TUMS) chewable tablet 400 mg [elemental]  400 mg Oral TID PRN    sodium chloride (NS) flush 5-10 mL  5-10 mL IntraVENous Q8H    sodium chloride (NS) flush 5-10 mL  5-10 mL IntraVENous PRN    acetaminophen (TYLENOL) tablet 650 mg  650 mg Oral Q4H PRN    ondansetron (ZOFRAN) injection 4 mg  4 mg IntraVENous Q4H PRN    heparin (porcine) injection 5,000 Units  5,000 Units SubCUTAneous Q8H       Other Studies (last 24 hours):  No results found. Assessment and Plan:     Hospital Problems as of 4/3/2018  Date Reviewed: 3/29/2018          Codes Class Noted - Resolved POA    Chronic hepatitis C without hepatic coma ([facility] ICD-10-CM: B18.2  ICD-9-CM: 070.54  3/29/2018 - Present Unknown        Hepatitis ICD-10-CM: K75.9  ICD-9-CM: 573.3  3/27/2018 - Present Yes        Rhabdomyolysis ICD-10-CM: M62.82  ICD-9-CM: 728.88  3/27/2018 - Present Yes        * (Principal)ARF (acute renal failure) ([facility] ICD-10-CM: N17.9  ICD-9-CM: 584.9  3/26/2018 - Present Yes        Heroin abuse ICD-10-CM: F11.10  ICD-9-CM: 305.50  3/26/2018 - Present Yes        Tobacco abuse ICD-10-CM: Z72.0  ICD-9-CM: 305.1  3/26/2018 - Present Yes    GERD          PLAN:    AISSATOU- on dialysis-had biopsy on 4/6/18- improving creatinine after HD. HD sessions to be decided on a daily basis according to his symptoms and lab data. GERD- on protonix  Hepatitis C infection. This could be responsible for his AISSATOU. Pending renal biopsy. Poly substance abuse : I advised him to quit. Tobacco abuse : I advised him to quit.   htn- prn hydralazine, and amlodipine. BP is controlled now. On zoloft    DC planning/Dispo: If he is still dependent on HD, will need out-patient slot.  However will monitor BMP in hospital to see if he will recover renal function. DVT ppx:  heparin    I have discussed the plan of care with patient.        Signed:  Angel Pena MD

## 2018-04-11 LAB
ALBUMIN SERPL-MCNC: 3.3 G/DL (ref 3.5–5)
ALBUMIN SERPL-MCNC: 3.4 G/DL (ref 3.5–5)
ALBUMIN/GLOB SERPL: 0.9 {RATIO} (ref 1.2–3.5)
ALP SERPL-CCNC: 72 U/L (ref 50–136)
ALT SERPL-CCNC: 92 U/L (ref 12–65)
ANION GAP SERPL CALC-SCNC: 9 MMOL/L (ref 7–16)
AST SERPL-CCNC: 20 U/L (ref 15–37)
BASOPHILS # BLD: 0.1 K/UL (ref 0–0.2)
BASOPHILS NFR BLD: 1 % (ref 0–2)
BILIRUB DIRECT SERPL-MCNC: 0.1 MG/DL
BILIRUB SERPL-MCNC: 0.4 MG/DL (ref 0.2–1.1)
BUN SERPL-MCNC: 35 MG/DL (ref 6–23)
CALCIUM SERPL-MCNC: 9.5 MG/DL (ref 8.3–10.4)
CHLORIDE SERPL-SCNC: 108 MMOL/L (ref 98–107)
CO2 SERPL-SCNC: 27 MMOL/L (ref 21–32)
CREAT SERPL-MCNC: 1.95 MG/DL (ref 0.8–1.5)
CREAT UR-MCNC: 31.7 MG/DL
DIFFERENTIAL METHOD BLD: ABNORMAL
EOSINOPHIL # BLD: 0.3 K/UL (ref 0–0.8)
EOSINOPHIL NFR BLD: 4 % (ref 0.5–7.8)
ERYTHROCYTE [DISTWIDTH] IN BLOOD BY AUTOMATED COUNT: 12.6 % (ref 11.9–14.6)
GLOBULIN SER CALC-MCNC: 3.7 G/DL (ref 2.3–3.5)
GLUCOSE SERPL-MCNC: 90 MG/DL (ref 65–100)
HCT VFR BLD AUTO: 35.8 % (ref 41.1–50.3)
HGB BLD-MCNC: 12.1 G/DL (ref 13.6–17.2)
IMM GRANULOCYTES # BLD: 0 K/UL (ref 0–0.5)
IMM GRANULOCYTES NFR BLD AUTO: 0 % (ref 0–5)
LYMPHOCYTES # BLD: 1.6 K/UL (ref 0.5–4.6)
LYMPHOCYTES NFR BLD: 26 % (ref 13–44)
MAGNESIUM SERPL-MCNC: 1.8 MG/DL (ref 1.8–2.4)
MCH RBC QN AUTO: 30.4 PG (ref 26.1–32.9)
MCHC RBC AUTO-ENTMCNC: 33.8 G/DL (ref 31.4–35)
MCV RBC AUTO: 89.9 FL (ref 79.6–97.8)
MONOCYTES # BLD: 0.5 K/UL (ref 0.1–1.3)
MONOCYTES NFR BLD: 7 % (ref 4–12)
NEUTS SEG # BLD: 3.8 K/UL (ref 1.7–8.2)
NEUTS SEG NFR BLD: 62 % (ref 43–78)
PHOSPHATE SERPL-MCNC: 4 MG/DL (ref 2.5–4.5)
PLATELET # BLD AUTO: 336 K/UL (ref 150–450)
PMV BLD AUTO: 8.4 FL (ref 10.8–14.1)
POTASSIUM SERPL-SCNC: 4.5 MMOL/L (ref 3.5–5.1)
PROT SERPL-MCNC: 7.1 G/DL (ref 6.3–8.2)
PROT UR-MCNC: 11 MG/DL
PROT/CREAT UR-RTO: 0.3
RBC # BLD AUTO: 3.98 M/UL (ref 4.23–5.67)
SODIUM SERPL-SCNC: 144 MMOL/L (ref 136–145)
WBC # BLD AUTO: 6.2 K/UL (ref 4.3–11.1)

## 2018-04-11 PROCEDURE — 74011250637 HC RX REV CODE- 250/637: Performed by: FAMILY MEDICINE

## 2018-04-11 PROCEDURE — 83735 ASSAY OF MAGNESIUM: CPT | Performed by: INTERNAL MEDICINE

## 2018-04-11 PROCEDURE — 65270000029 HC RM PRIVATE

## 2018-04-11 PROCEDURE — 85025 COMPLETE CBC W/AUTO DIFF WBC: CPT | Performed by: INTERNAL MEDICINE

## 2018-04-11 PROCEDURE — 84156 ASSAY OF PROTEIN URINE: CPT | Performed by: INTERNAL MEDICINE

## 2018-04-11 PROCEDURE — 36415 COLL VENOUS BLD VENIPUNCTURE: CPT | Performed by: INTERNAL MEDICINE

## 2018-04-11 PROCEDURE — 74011250637 HC RX REV CODE- 250/637: Performed by: INTERNAL MEDICINE

## 2018-04-11 PROCEDURE — 74011250636 HC RX REV CODE- 250/636: Performed by: INTERNAL MEDICINE

## 2018-04-11 PROCEDURE — 80076 HEPATIC FUNCTION PANEL: CPT | Performed by: INTERNAL MEDICINE

## 2018-04-11 PROCEDURE — 80069 RENAL FUNCTION PANEL: CPT | Performed by: INTERNAL MEDICINE

## 2018-04-11 RX ADMIN — HEPARIN SODIUM 5000 UNITS: 5000 INJECTION, SOLUTION INTRAVENOUS; SUBCUTANEOUS at 15:17

## 2018-04-11 RX ADMIN — PANTOPRAZOLE SODIUM 40 MG: 40 TABLET, DELAYED RELEASE ORAL at 04:58

## 2018-04-11 RX ADMIN — ACETAMINOPHEN 650 MG: 500 TABLET, FILM COATED ORAL at 15:19

## 2018-04-11 RX ADMIN — AMLODIPINE BESYLATE 10 MG: 10 TABLET ORAL at 08:23

## 2018-04-11 RX ADMIN — HEPARIN SODIUM 5000 UNITS: 5000 INJECTION, SOLUTION INTRAVENOUS; SUBCUTANEOUS at 22:25

## 2018-04-11 RX ADMIN — HEPARIN SODIUM 5000 UNITS: 5000 INJECTION, SOLUTION INTRAVENOUS; SUBCUTANEOUS at 04:57

## 2018-04-11 RX ADMIN — Medication 10 ML: at 15:20

## 2018-04-11 RX ADMIN — Medication 10 ML: at 22:23

## 2018-04-11 RX ADMIN — SERTRALINE HYDROCHLORIDE 25 MG: 50 TABLET ORAL at 08:23

## 2018-04-11 NOTE — PROGRESS NOTES
[facility] PROGRESS NOTE    Follow up for: AISSATOU    Subjective:   Patient seen and examined. Chart, notes, labs, imaging, results all reviewed. He denies complaints. Making more urine. Labs trending down. Last HD on Monday 4/9.  No sob, cp, n/v.     ROS:  Gen - no fever, no chills, appetite okay  CV - no chest pain, no orthopnea  Lung - no shortness of breath, no cough  Abd - no tenderness, no nausea, no vomiting  Ext - no edema    Objective:   Exam:  Vitals:    04/10/18 1904 04/10/18 2316 04/11/18 0323 04/11/18 0747   BP: 114/73 115/74 118/78 103/68   Pulse: 82 80 79 74   Resp: 18 18 18 18   Temp: 97.6 °F (36.4 °C) 98.1 °F (36.7 °C) 97.8 °F (36.6 °C) 97.5 °F (36.4 °C)   SpO2: 96% 95% 94% 97%   Weight:       Height:           No intake or output data in the 24 hours ending 04/11/18 1154    Current Facility-Administered Medications   Medication Dose Route Frequency    lidocaine (XYLOCAINE) 20 mg/mL (2 %) injection  mg  1-20 mL SubCUTAneous Multiple    fentaNYL citrate (PF) injection  mcg   mcg IntraVENous Multiple    midazolam (VERSED) injection 0.5-2 mg  0.5-2 mg IntraVENous Rad Multiple    sertraline (ZOLOFT) tablet 25 mg  25 mg Oral DAILY    amLODIPine (NORVASC) tablet 10 mg  10 mg Oral DAILY    diphenhydrAMINE (BENADRYL) capsule 25 mg  25 mg Oral QHS PRN    pantoprazole (PROTONIX) tablet 40 mg  40 mg Oral ACB    traMADol (ULTRAM) tablet 50 mg  50 mg Oral Q6H PRN    calcium carbonate (TUMS) chewable tablet 400 mg [elemental]  400 mg Oral TID PRN    sodium chloride (NS) flush 5-10 mL  5-10 mL IntraVENous Q8H    sodium chloride (NS) flush 5-10 mL  5-10 mL IntraVENous PRN    acetaminophen (TYLENOL) tablet 650 mg  650 mg Oral Q4H PRN    ondansetron (ZOFRAN) injection 4 mg  4 mg IntraVENous Q4H PRN    heparin (porcine) injection 5,000 Units  5,000 Units SubCUTAneous Q8H       EXAM  GEN - Alert, oriented, in no distress  CV - S1, S2, RRR, no rub, murmur, or gallop  Lung - clear to auscultation bilaterally  Abd - soft, nontender, BS present  Ext - no edema    Recent Labs      04/11/18   0544  04/10/18   0548   WBC  6.2  6.0   HGB  12.1*  12.6*   HCT  35.8*  36.9*   PLT  336  328        Recent Labs      04/11/18   0544  04/10/18   0548  04/09/18   0520   NA  144  143  142   K  4.5  4.2  4.0   CL  108*  106  106   CO2  27  27  26   BUN  35*  34*  44*   CREA  1.95*  2.40*  3.73*   CA  9.5  9.2  9.3   GLU  90  89  94   MG  1.8  1.9  1.8   PHOS  4.0   --    --        Assessment and Plan:   AISSATOU unspecified - heroin use, methamphetamine use, n/v, low bp, goody powders  - presumed glomerular in origin given 6 gms of nephrotic-range proteinuria, active urine sediment, low complements, and hepatitis.  Renal imaging unremarkable.  ?underlying FSGS or hepatitis C-mediated MPGN (fits with the urine sediment and complement levels )  - s/p renal biopsy 4/6/18 - biopsy c/w ATN  - HD dependent since 3/30  - initially anuric-->uop up and creatinine down without HD. Looks like he is starting to have some renal recovery.  Continue to hold HD and follow labs/uop  - recheck urine prot/creat    Heroin use  Hepatitis C      [facility] SHELTON Garduno

## 2018-04-11 NOTE — PROGRESS NOTES
Hospitalist Progress Note     Admit Date:  3/26/2018  8:10 AM   Name:  Leti Alan   Age:  26 y.o.  :  1985   MRN:  [id number]   PCP:  None  Treatment Team: Attending Provider: Adelaida Bartholomew MD; Care Manager: Jorge Partida RN; Consulting Provider: Vijaya Alan MD    Subjective:   Mr Leatha Sequeira is a 25 yo male with a hx of iv drug abuse who was admitted for nausea and vomiting. He Stated symptoms began after injecting heroin. Patient noted to have AISSATOU with Scr of 4.92, oliguric. Despite hydration, his kidney status worsened and he was placed under HD. He tested positive for Hep C, and low complements levels. Has a family history of SLE on his mother. HD was started this admission. His latest HD was on 2018. Had renal biopsy on 18. Was started on Zoloft during the stay. He continues to have good appetite and urine output. He feels better and stronger today. Objective:     Patient Vitals for the past 24 hrs:   Temp Pulse Resp BP SpO2   18 0747 97.5 °F (36.4 °C) 74 18 103/68 97 %   18 0323 97.8 °F (36.6 °C) 79 18 118/78 94 %   04/10/18 2316 98.1 °F (36.7 °C) 80 18 115/74 95 %   04/10/18 1904 97.6 °F (36.4 °C) 82 18 114/73 96 %   04/10/18 1450 98.1 °F (36.7 °C) 84 18 - 94 %   04/10/18 1145 98.2 °F (36.8 °C) 83 18 121/81 97 %     Oxygen Therapy  O2 Sat (%): 97 % (18 0747)  Pulse via Oximetry: 72 beats per minute (18 1240)  O2 Device: Room air (18 07)  O2 Flow Rate (L/min): 3 l/min (18 1201)  ETCO2 (mmHg): 0.4 mmHg (18 1201)  No intake or output data in the 24 hours ending 18 1019      General:    Well nourished. Alert. Lying in bed without respiratory distress. Hemodialysis catheter in place. No cellulitis surrounding the exit site. heent-   normal  CV:   RRR. No murmur, rub, or gallop. Lungs:   Clear to auscultation bilaterally. No wheezing, rhonchi, or rales.  Temporary dialysis cath rt chest  CNS- no focal neurological deficits  Abdomen:   Soft, nontender, nondistended. nontender biopsy site  Extremities: Warm and dry. No cyanosis or edema. Skin:     No rashes or jaundice. Data Review:  I have reviewed all labs, meds, telemetry events, and studies from the last 24 hours. Recent Results (from the past 24 hour(s))   MAGNESIUM    Collection Time: 04/11/18  5:44 AM   Result Value Ref Range    Magnesium 1.8 1.8 - 2.4 mg/dL   HEPATIC FUNCTION PANEL    Collection Time: 04/11/18  5:44 AM   Result Value Ref Range    Protein, total 7.1 6.3 - 8.2 g/dL    Albumin 3.4 (L) 3.5 - 5.0 g/dL    Globulin 3.7 (H) 2.3 - 3.5 g/dL    A-G Ratio 0.9 (L) 1.2 - 3.5      Bilirubin, total 0.4 0.2 - 1.1 MG/DL    Bilirubin, direct 0.1 <0.4 MG/DL    Alk. phosphatase 72 50 - 136 U/L    AST (SGOT) 20 15 - 37 U/L    ALT (SGPT) 92 (H) 12 - 65 U/L   CBC WITH AUTOMATED DIFF    Collection Time: 04/11/18  5:44 AM   Result Value Ref Range    WBC 6.2 4.3 - 11.1 K/uL    RBC 3.98 (L) 4.23 - 5.67 M/uL    HGB 12.1 (L) 13.6 - 17.2 g/dL    HCT 35.8 (L) 41.1 - 50.3 %    MCV 89.9 79.6 - 97.8 FL    MCH 30.4 26.1 - 32.9 PG    MCHC 33.8 31.4 - 35.0 g/dL    RDW 12.6 11.9 - 14.6 %    PLATELET 277 697 - 929 K/uL    MPV 8.4 (L) 10.8 - 14.1 FL    DF AUTOMATED      NEUTROPHILS 62 43 - 78 %    LYMPHOCYTES 26 13 - 44 %    MONOCYTES 7 4.0 - 12.0 %    EOSINOPHILS 4 0.5 - 7.8 %    BASOPHILS 1 0.0 - 2.0 %    IMMATURE GRANULOCYTES 0 0.0 - 5.0 %    ABS. NEUTROPHILS 3.8 1.7 - 8.2 K/UL    ABS. LYMPHOCYTES 1.6 0.5 - 4.6 K/UL    ABS. MONOCYTES 0.5 0.1 - 1.3 K/UL    ABS. EOSINOPHILS 0.3 0.0 - 0.8 K/UL    ABS. BASOPHILS 0.1 0.0 - 0.2 K/UL    ABS. IMM.  GRANS. 0.0 0.0 - 0.5 K/UL   RENAL FUNCTION PANEL    Collection Time: 04/11/18  5:44 AM   Result Value Ref Range    Sodium 144 136 - 145 mmol/L    Potassium 4.5 3.5 - 5.1 mmol/L    Chloride 108 (H) 98 - 107 mmol/L    CO2 27 21 - 32 mmol/L    Anion gap 9 7 - 16 mmol/L    Glucose 90 65 - 100 mg/dL    BUN 35 (H) 6 - 23 MG/DL Creatinine 1.95 (H) 0.8 - 1.5 MG/DL    GFR est AA 51 (L) >60 ml/min/1.73m2    GFR est non-AA 42 (L) >60 ml/min/1.73m2    Calcium 9.5 8.3 - 10.4 MG/DL    Phosphorus 4.0 2.5 - 4.5 MG/DL    Albumin 3.3 (L) 3.5 - 5.0 g/dL        All Micro Results     None          Current Meds:  Current Facility-Administered Medications   Medication Dose Route Frequency    lidocaine (XYLOCAINE) 20 mg/mL (2 %) injection  mg  1-20 mL SubCUTAneous Multiple    fentaNYL citrate (PF) injection  mcg   mcg IntraVENous Multiple    midazolam (VERSED) injection 0.5-2 mg  0.5-2 mg IntraVENous Rad Multiple    sertraline (ZOLOFT) tablet 25 mg  25 mg Oral DAILY    amLODIPine (NORVASC) tablet 10 mg  10 mg Oral DAILY    diphenhydrAMINE (BENADRYL) capsule 25 mg  25 mg Oral QHS PRN    pantoprazole (PROTONIX) tablet 40 mg  40 mg Oral ACB    traMADol (ULTRAM) tablet 50 mg  50 mg Oral Q6H PRN    calcium carbonate (TUMS) chewable tablet 400 mg [elemental]  400 mg Oral TID PRN    sodium chloride (NS) flush 5-10 mL  5-10 mL IntraVENous Q8H    sodium chloride (NS) flush 5-10 mL  5-10 mL IntraVENous PRN    acetaminophen (TYLENOL) tablet 650 mg  650 mg Oral Q4H PRN    ondansetron (ZOFRAN) injection 4 mg  4 mg IntraVENous Q4H PRN    heparin (porcine) injection 5,000 Units  5,000 Units SubCUTAneous Q8H       Other Studies (last 24 hours):  No results found.     Assessment and Plan:     Hospital Problems as of 4/3/2018  Date Reviewed: 3/29/2018          Codes Class Noted - Resolved POA    Chronic hepatitis C without hepatic coma ([facility] [address] ICD-10-CM: B18.2  ICD-9-CM: 070.54  3/29/2018 - Present Unknown        Hepatitis ICD-10-CM: K75.9  ICD-9-CM: 573.3  3/27/2018 - Present Yes        Rhabdomyolysis ICD-10-CM: M62.82  ICD-9-CM: 728.88  3/27/2018 - Present Yes        * (Principal)ARF (acute renal failure) [facility] ICD-10-CM: N17.9  ICD-9-CM: 584.9  3/26/2018 - Present Yes        Heroin abuse ICD-10-CM: F11.10  ICD-9-CM: 305.50  3/26/2018 - Present Yes        Tobacco abuse ICD-10-CM: Z72.0  ICD-9-CM: 305.1  3/26/2018 - Present Yes    GERD          PLAN:    AISSATOU- on dialysis-had biopsy on 4/6/18- improving creatinine after HD. HD sessions to be decided on a daily basis according to his symptoms and lab data. Creatinine today shows decrease in value without HD for the past 2 days. With good urine output, he shows signs of recovery. No evidence of fluid overload or depletion. Advised him to take ora fluid more to prevent volume depletion from diuresis in recovery phase. GERD- on protonix  Hepatitis C infection. This could be responsible for his AISSATOU. Pending renal biopsy. Poly substance abuse : I advised him to quit. Tobacco abuse : I advised him to quit.   htn- prn hydralazine, and amlodipine. BP is controlled now. On zoloft    DC planning/Dispo:  Monitor renal function further. If no need for HD, may go home in 1-2 days. DVT ppx:  heparin    I have discussed the plan of care with patient.        Signed:  Priscilla Ocasio MD

## 2018-04-11 NOTE — PROGRESS NOTES
Spoke with Dr. Chrissy Gonzalez and as of right now patient is not requiring dialysis. Per MD, if patient's renal function continues to approve, patient could discharge in 1-2 days. Call placed to [facility] to see if they have any bed availability. Awaiting call back. Provided patient with information on [facility] for medications and PCP followup.

## 2018-04-12 ENCOUNTER — APPOINTMENT (OUTPATIENT)
Dept: INTERVENTIONAL RADIOLOGY/VASCULAR | Age: 33
DRG: 674 | End: 2018-04-12
Attending: INTERNAL MEDICINE
Payer: SELF-PAY

## 2018-04-12 LAB
ANION GAP SERPL CALC-SCNC: 9 MMOL/L (ref 7–16)
BUN SERPL-MCNC: 31 MG/DL (ref 6–23)
CALCIUM SERPL-MCNC: 9.3 MG/DL (ref 8.3–10.4)
CHLORIDE SERPL-SCNC: 110 MMOL/L (ref 98–107)
CO2 SERPL-SCNC: 25 MMOL/L (ref 21–32)
CREAT SERPL-MCNC: 1.57 MG/DL (ref 0.8–1.5)
GLUCOSE SERPL-MCNC: 94 MG/DL (ref 65–100)
POTASSIUM SERPL-SCNC: 4.1 MMOL/L (ref 3.5–5.1)
SODIUM SERPL-SCNC: 144 MMOL/L (ref 136–145)

## 2018-04-12 PROCEDURE — 74011250637 HC RX REV CODE- 250/637: Performed by: FAMILY MEDICINE

## 2018-04-12 PROCEDURE — 80048 BASIC METABOLIC PNL TOTAL CA: CPT | Performed by: INTERNAL MEDICINE

## 2018-04-12 PROCEDURE — 36589 REMOVAL TUNNELED CV CATH: CPT

## 2018-04-12 PROCEDURE — 36415 COLL VENOUS BLD VENIPUNCTURE: CPT | Performed by: INTERNAL MEDICINE

## 2018-04-12 PROCEDURE — 74011250636 HC RX REV CODE- 250/636: Performed by: INTERNAL MEDICINE

## 2018-04-12 PROCEDURE — 02PA33Z REMOVAL OF INFUSION DEVICE FROM HEART, PERCUTANEOUS APPROACH: ICD-10-PCS | Performed by: RADIOLOGY

## 2018-04-12 PROCEDURE — 65270000029 HC RM PRIVATE

## 2018-04-12 PROCEDURE — 74011250637 HC RX REV CODE- 250/637: Performed by: INTERNAL MEDICINE

## 2018-04-12 PROCEDURE — 0JPT3XZ REMOVAL OF TUNNELED VASCULAR ACCESS DEVICE FROM TRUNK SUBCUTANEOUS TISSUE AND FASCIA, PERCUTANEOUS APPROACH: ICD-10-PCS | Performed by: RADIOLOGY

## 2018-04-12 RX ADMIN — Medication 10 ML: at 13:56

## 2018-04-12 RX ADMIN — Medication 10 ML: at 21:34

## 2018-04-12 RX ADMIN — SERTRALINE HYDROCHLORIDE 25 MG: 50 TABLET ORAL at 09:01

## 2018-04-12 RX ADMIN — HEPARIN SODIUM 5000 UNITS: 5000 INJECTION, SOLUTION INTRAVENOUS; SUBCUTANEOUS at 16:50

## 2018-04-12 RX ADMIN — HEPARIN SODIUM 5000 UNITS: 5000 INJECTION, SOLUTION INTRAVENOUS; SUBCUTANEOUS at 05:41

## 2018-04-12 RX ADMIN — ACETAMINOPHEN 650 MG: 500 TABLET, FILM COATED ORAL at 16:49

## 2018-04-12 RX ADMIN — HEPARIN SODIUM 5000 UNITS: 5000 INJECTION, SOLUTION INTRAVENOUS; SUBCUTANEOUS at 21:36

## 2018-04-12 RX ADMIN — PANTOPRAZOLE SODIUM 40 MG: 40 TABLET, DELAYED RELEASE ORAL at 05:13

## 2018-04-12 RX ADMIN — AMLODIPINE BESYLATE 10 MG: 10 TABLET ORAL at 09:01

## 2018-04-12 RX ADMIN — Medication 10 ML: at 05:14

## 2018-04-12 NOTE — PROGRESS NOTES
Spoke with Meenakshi Childs at [facility], and he stated he could have bed available for patient on Monday 4/16/18. Also informed Belk[facility]ric[facility] Moultonsonya [facility]. Facility has also agreed to transport patient to follow up appointments. Met with patient and he is agreeable to go to [facility] on Monday 4/16/18. Patient provided with information on rescue mission and provided number to [facility] and instructed patient to call [facility] on 4/18/18 to let them know he would like to come back. Patient verbalized understanding. At discharge patient will require a voucher for medications. Also provided patient information about [facility] for medications and follow up appointments.

## 2018-04-12 NOTE — PROGRESS NOTES
Hospitalist ok with pt having bread with meals. Pt diet to remain renal for the time being. No new complaints. Hourly rounds completed this shift.

## 2018-04-12 NOTE — PROGRESS NOTES
Pt was calm and denies any pain. Hourly rounds were done and pt needs were met. Report will be given to day shift nurse and will continue to monitor.

## 2018-04-12 NOTE — INTERDISCIPLINARY ROUNDS
Interdisciplinary team rounds were held 4/11/2018 with the following team members:Care Management, Nursing, Physical Therapy and Physician. Plan of Care options were discussed with the team and the patient.

## 2018-04-12 NOTE — PROGRESS NOTES
[facility] PROGRESS NOTE    Follow up for: AISSATOU    Subjective:   Patient seen and examined. Chart, notes, labs, imaging, results all reviewed. He denies complaints. Making more urine. Labs trending down. Last HD on Monday 4/9.  No sob, cp, n/v.     ROS:  Gen - no fever, no chills, appetite okay  CV - no chest pain, no orthopnea  Lung - no shortness of breath, no cough  Abd - no tenderness, no nausea, no vomiting  Ext - no edema    Objective:   Exam:  Vitals:    04/11/18 2308 04/12/18 0424 04/12/18 0443 04/12/18 0731   BP: 124/73 113/70  114/76   Pulse: 82 72  71   Resp: 18 18 18   Temp: 98.1 °F (36.7 °C) 97.7 °F (36.5 °C)  97.6 °F (36.4 °C)   SpO2: 97% 98%  97%   Weight:   74.3 kg (163 lb 12.8 oz)    Height:           No intake or output data in the 24 hours ending 04/12/18 1007    Current Facility-Administered Medications   Medication Dose Route Frequency    lidocaine (XYLOCAINE) 20 mg/mL (2 %) injection  mg  1-20 mL SubCUTAneous Multiple    fentaNYL citrate (PF) injection  mcg   mcg IntraVENous Multiple    midazolam (VERSED) injection 0.5-2 mg  0.5-2 mg IntraVENous Rad Multiple    sertraline (ZOLOFT) tablet 25 mg  25 mg Oral DAILY    amLODIPine (NORVASC) tablet 10 mg  10 mg Oral DAILY    diphenhydrAMINE (BENADRYL) capsule 25 mg  25 mg Oral QHS PRN    pantoprazole (PROTONIX) tablet 40 mg  40 mg Oral ACB    traMADol (ULTRAM) tablet 50 mg  50 mg Oral Q6H PRN    calcium carbonate (TUMS) chewable tablet 400 mg [elemental]  400 mg Oral TID PRN    sodium chloride (NS) flush 5-10 mL  5-10 mL IntraVENous Q8H    sodium chloride (NS) flush 5-10 mL  5-10 mL IntraVENous PRN    acetaminophen (TYLENOL) tablet 650 mg  650 mg Oral Q4H PRN    ondansetron (ZOFRAN) injection 4 mg  4 mg IntraVENous Q4H PRN    heparin (porcine) injection 5,000 Units  5,000 Units SubCUTAneous Q8H       EXAM  GEN - Alert, oriented, in no distress  CV - S1, S2, RRR, no rub, murmur, or gallop  Lung - clear to auscultation bilaterally  Abd - soft, nontender, BS present  Ext - no edema    Recent Labs      04/11/18   0544  04/10/18   0548   WBC  6.2  6.0   HGB  12.1*  12.6*   HCT  35.8*  36.9*   PLT  336  328        Recent Labs      04/12/18   0519  04/11/18   0544  04/10/18   0548   NA  144  144  143   K  4.1  4.5  4.2   CL  110*  108*  106   CO2  25  27  27   BUN  31*  35*  34*   CREA  1.57*  1.95*  2.40*   CA  9.3  9.5  9.2   GLU  94  90  89   MG   --   1.8  1.9   PHOS   --   4.0   --        Assessment and Plan:   AISSATOU unspecified - heroin use, methamphetamine use, n/v, low bp, goody powders  - presumed glomerular in origin given 6 gms of nephrotic-range proteinuria, active urine sediment, low complements, and hepatitis.  Renal imaging unremarkable.  ?underlying FSGS or hepatitis C-mediated MPGN (fits with the urine sediment and complement levels )  - s/p renal biopsy 4/6/18 - biopsy c/w ATN  - HD dependent since 3/30  - initially anuric-->uop up and creatinine down without HD. Looks like he is starting to have some renal recovery. No need for further HD.  Plan removal of HD catheter and continue to follow labs/uop  - recheck urine prot/creat=>0.3    Heroin use  Hepatitis C      Linette Garduno, ACNP

## 2018-04-12 NOTE — PROGRESS NOTES
Hospitalist Progress Note     Admit Date:  3/26/2018  8:10 AM   Name:  Heladio Rincon   Age:  26 y.o.  :  1985   MRN:  [id number]   PCP:  None  Treatment Team: Attending Provider: Rosalee Middleton MD; Care Manager: Nathaniel Juan RN; Consulting Provider: Celeste Osborne MD    Subjective:   Mr Yarelis Mayers is a 25 yo male with a hx of iv drug abuse who was admitted for nausea and vomiting. He Stated symptoms began after injecting heroin. Patient noted to have AISSATOU with Scr of 4.92, oliguric. Despite hydration, his kidney status worsened and he was placed under HD. He tested positive for Hep C, and low complements levels. Has a family history of SLE on his mother. HD was started this admission. His latest HD was on 2018. Had renal biopsy on 18. Result is consistent with ATN as per nephrology. Was started on Zoloft during the stay. He continues to have good appetite and urine output. Objective:     Patient Vitals for the past 24 hrs:   Temp Pulse Resp BP SpO2   18 0731 97.6 °F (36.4 °C) 71 18 114/76 97 %   18 0424 97.7 °F (36.5 °C) 72 18 113/70 98 %   18 2308 98.1 °F (36.7 °C) 82 18 124/73 97 %   18 1922 98.2 °F (36.8 °C) 84 18 (!) 132/95 98 %   18 1431 97 °F (36.1 °C) 70 18 132/85 93 %   18 1201 98.1 °F (36.7 °C) 61 18 122/82 95 %     Oxygen Therapy  O2 Sat (%): 97 % (18 0731)  Pulse via Oximetry: 72 beats per minute (18 1240)  O2 Device: Room air (18 0731)  O2 Flow Rate (L/min): 3 l/min (18 1201)  ETCO2 (mmHg): 0.4 mmHg (18 1201)  No intake or output data in the 24 hours ending 04/12/18 1056      General:    Well nourished. Alert. Lying in bed without respiratory distress. Hemodialysis catheter in place. No cellulitis surrounding the exit site. heent-   normal  CV:   RRR. No murmur, rub, or gallop. Lungs:   Clear to auscultation bilaterally. No wheezing, rhonchi, or rales.  Temporary dialysis cath rt chest  CNS- no focal neurological deficits  Abdomen:   Soft, nontender, nondistended. nontender biopsy site  Extremities: Warm and dry. No cyanosis or edema. Skin:     No rashes or jaundice. Data Review:  I have reviewed all labs, meds, telemetry events, and studies from the last 24 hours. Recent Results (from the past 24 hour(s))   PROTEIN/CREATININE RATIO, URINE    Collection Time: 04/11/18  7:06 PM   Result Value Ref Range    Protein, urine random 11 <11.9 mg/dL    Creatinine, urine 31.70 mg/dL    Protein/Creat.  urine Ratio 0.3     METABOLIC PANEL, BASIC    Collection Time: 04/12/18  5:19 AM   Result Value Ref Range    Sodium 144 136 - 145 mmol/L    Potassium 4.1 3.5 - 5.1 mmol/L    Chloride 110 (H) 98 - 107 mmol/L    CO2 25 21 - 32 mmol/L    Anion gap 9 7 - 16 mmol/L    Glucose 94 65 - 100 mg/dL    BUN 31 (H) 6 - 23 MG/DL    Creatinine 1.57 (H) 0.8 - 1.5 MG/DL    GFR est AA >60 >60 ml/min/1.73m2    GFR est non-AA 54 (L) >60 ml/min/1.73m2    Calcium 9.3 8.3 - 10.4 MG/DL        All Micro Results     None          Current Meds:  Current Facility-Administered Medications   Medication Dose Route Frequency    sertraline (ZOLOFT) tablet 25 mg  25 mg Oral DAILY    amLODIPine (NORVASC) tablet 10 mg  10 mg Oral DAILY    diphenhydrAMINE (BENADRYL) capsule 25 mg  25 mg Oral QHS PRN    pantoprazole (PROTONIX) tablet 40 mg  40 mg Oral ACB    traMADol (ULTRAM) tablet 50 mg  50 mg Oral Q6H PRN    calcium carbonate (TUMS) chewable tablet 400 mg [elemental]  400 mg Oral TID PRN    sodium chloride (NS) flush 5-10 mL  5-10 mL IntraVENous Q8H    sodium chloride (NS) flush 5-10 mL  5-10 mL IntraVENous PRN    acetaminophen (TYLENOL) tablet 650 mg  650 mg Oral Q4H PRN    ondansetron (ZOFRAN) injection 4 mg  4 mg IntraVENous Q4H PRN    heparin (porcine) injection 5,000 Units  5,000 Units SubCUTAneous Q8H             Assessment and Plan:     Hospital Problems as of 4/3/2018  Date Reviewed: 3/29/2018          Codes Class Noted - Resolved POA    Chronic hepatitis C without hepatic coma ([facility] [address] ICD-10-CM: B18.2  ICD-9-CM: 070.54  3/29/2018 - Present Unknown        Hepatitis ICD-10-CM: K75.9  ICD-9-CM: 573.3  3/27/2018 - Present Yes        Rhabdomyolysis ICD-10-CM: M62.82  ICD-9-CM: 728.88  3/27/2018 - Present Yes        * (Principal)ARF (acute renal failure) ([facility]) ICD-10-CM: N17.9  ICD-9-CM: 584.9  3/26/2018 - Present Yes        Heroin abuse ICD-10-CM: F11.10  ICD-9-CM: 305.50  3/26/2018 - Present Yes        Tobacco abuse ICD-10-CM: Z72.0  ICD-9-CM: 305.1  3/26/2018 - Present Yes    GERD          PLAN:    AISSATOU- on dialysis-had biopsy on 4/6/18- improving creatinine after HD. HD sessions to be decided on a daily basis according to his symptoms and lab data. Creatinine today shows further decrease in value without HD for the past 3 days. With good urine output, he shows signs of recovery. No evidence of fluid overload or depletion. Advised him to take oral fluid more to prevent volume depletion from diuresis in recovery phase. GERD- on protonix  Hepatitis C infection. This could be responsible for his AISSATOU. Possible MPGN. Poly substance abuse : I advised him to quit. Tobacco abuse : I advised him to quit.   htn- prn hydralazine, and amlodipine. BP is controlled now. On zoloft    DC planning/Dispo:  Creatinine is further down with no fluid overload. Acceptable electrolytes values. Potentially if patient has reliable follow-up setup, he can be discharged and followed up as out-patient. Will work on arrangement with  and nephrology service. DVT ppx:  heparin    I have discussed the plan of care with patient and .      Signed:  Nabeel Bradshaw MD

## 2018-04-13 LAB
ANION GAP SERPL CALC-SCNC: 9 MMOL/L (ref 7–16)
BUN SERPL-MCNC: 30 MG/DL (ref 6–23)
CALCIUM SERPL-MCNC: 9.1 MG/DL (ref 8.3–10.4)
CHLORIDE SERPL-SCNC: 112 MMOL/L (ref 98–107)
CO2 SERPL-SCNC: 23 MMOL/L (ref 21–32)
CREAT SERPL-MCNC: 1.45 MG/DL (ref 0.8–1.5)
GLUCOSE SERPL-MCNC: 95 MG/DL (ref 65–100)
POTASSIUM SERPL-SCNC: 4.1 MMOL/L (ref 3.5–5.1)
SODIUM SERPL-SCNC: 144 MMOL/L (ref 136–145)

## 2018-04-13 PROCEDURE — 74011250637 HC RX REV CODE- 250/637: Performed by: FAMILY MEDICINE

## 2018-04-13 PROCEDURE — 65270000029 HC RM PRIVATE

## 2018-04-13 PROCEDURE — 74011250637 HC RX REV CODE- 250/637: Performed by: INTERNAL MEDICINE

## 2018-04-13 PROCEDURE — 80048 BASIC METABOLIC PNL TOTAL CA: CPT | Performed by: INTERNAL MEDICINE

## 2018-04-13 PROCEDURE — 36415 COLL VENOUS BLD VENIPUNCTURE: CPT | Performed by: INTERNAL MEDICINE

## 2018-04-13 PROCEDURE — 74011250636 HC RX REV CODE- 250/636: Performed by: INTERNAL MEDICINE

## 2018-04-13 RX ADMIN — AMLODIPINE BESYLATE 10 MG: 10 TABLET ORAL at 09:24

## 2018-04-13 RX ADMIN — HEPARIN SODIUM 5000 UNITS: 5000 INJECTION, SOLUTION INTRAVENOUS; SUBCUTANEOUS at 06:39

## 2018-04-13 RX ADMIN — ACETAMINOPHEN 650 MG: 500 TABLET, FILM COATED ORAL at 20:22

## 2018-04-13 RX ADMIN — DIPHENHYDRAMINE HYDROCHLORIDE 25 MG: 25 CAPSULE ORAL at 22:39

## 2018-04-13 RX ADMIN — Medication 10 ML: at 05:09

## 2018-04-13 RX ADMIN — HEPARIN SODIUM 5000 UNITS: 5000 INJECTION, SOLUTION INTRAVENOUS; SUBCUTANEOUS at 15:25

## 2018-04-13 RX ADMIN — HEPARIN SODIUM 5000 UNITS: 5000 INJECTION, SOLUTION INTRAVENOUS; SUBCUTANEOUS at 22:39

## 2018-04-13 RX ADMIN — ACETAMINOPHEN 650 MG: 500 TABLET, FILM COATED ORAL at 09:27

## 2018-04-13 RX ADMIN — PANTOPRAZOLE SODIUM 40 MG: 40 TABLET, DELAYED RELEASE ORAL at 05:09

## 2018-04-13 RX ADMIN — SERTRALINE HYDROCHLORIDE 25 MG: 50 TABLET ORAL at 09:24

## 2018-04-13 NOTE — INTERDISCIPLINARY ROUNDS
Interdisciplinary team rounds were held 4/13/2018 with the following team members:Care Management, Nursing, Physical Therapy and Physician.      Plan of Care options were discussed with the team and the patient.

## 2018-04-13 NOTE — PROGRESS NOTES
Pt denies of any pain and slept all night. Hourly rounds were done and pt needs were met. Report will be given to day shift nurse and will continue to monitor.

## 2018-04-13 NOTE — PROGRESS NOTES
[facility] PROGRESS NOTE    Follow up for: AISSATOU    Subjective:   Patient seen and examined. Chart, notes, labs, imaging, results all reviewed. Doing well. Continues to have good urine output. Last HD on Monday 4/9. HD cath removed.     ROS:  No CP, no SOB, no edema    Objective:   Exam:  Vitals:    04/13/18 0645 04/13/18 0724 04/13/18 1054 04/13/18 1528   BP:  123/84 126/86 130/88   Pulse:  90 89 94   Resp:  20 18 18   Temp:  98.2 °F (36.8 °C) 98.3 °F (36.8 °C) 98.4 °F (36.9 °C)   SpO2:  95% 99% 99%   Weight: 68.5 kg (151 lb)      Height:             Intake/Output Summary (Last 24 hours) at 04/13/18 1542  Last data filed at 04/13/18 1253   Gross per 24 hour   Intake              595 ml   Output                0 ml   Net              595 ml       Current Facility-Administered Medications   Medication Dose Route Frequency    sertraline (ZOLOFT) tablet 25 mg  25 mg Oral DAILY    amLODIPine (NORVASC) tablet 10 mg  10 mg Oral DAILY    diphenhydrAMINE (BENADRYL) capsule 25 mg  25 mg Oral QHS PRN    pantoprazole (PROTONIX) tablet 40 mg  40 mg Oral ACB    traMADol (ULTRAM) tablet 50 mg  50 mg Oral Q6H PRN    calcium carbonate (TUMS) chewable tablet 400 mg [elemental]  400 mg Oral TID PRN    sodium chloride (NS) flush 5-10 mL  5-10 mL IntraVENous Q8H    sodium chloride (NS) flush 5-10 mL  5-10 mL IntraVENous PRN    acetaminophen (TYLENOL) tablet 650 mg  650 mg Oral Q4H PRN    ondansetron (ZOFRAN) injection 4 mg  4 mg IntraVENous Q4H PRN    heparin (porcine) injection 5,000 Units  5,000 Units SubCUTAneous Q8H       EXAM  GEN - Alert, oriented, in no distress  CV - S1, S2, RRR, no rub, murmur, or gallop  Lung - clear to auscultation bilaterally  Abd - soft, nontender, BS present  Ext - no edema    Recent Labs      04/11/18   0544   WBC  6.2   HGB  12.1*   HCT  35.8*   PLT  336        Recent Labs      04/13/18   0537  04/12/18   0519  04/11/18   0544   NA  144  144  144   K  4.1  4.1  4.5   CL  112*  110* 108*   CO2  23  25  27   BUN  30*  31*  35*   CREA  1.45  1.57*  1.95*   CA  9.1  9.3  9.5   GLU  95  94  90   MG   --    --   1.8   PHOS   --    --   4.0       Assessment and Plan:   AISSATOU 2/2 ATN- resolving. HD cath removed    Heroin use  Hepatitis C    Will sign off as acute renal issues are resolving. Please do not hesitate to contact us with any changes or should new issues arise. Thanks!     Rocky Harper

## 2018-04-13 NOTE — PROGRESS NOTES
Hospitalist Progress Note     Admit Date:  3/26/2018  8:10 AM   Name:  Sebastian Ovalles   Age:  26 y.o.  :  1985   MRN:  [id number]   PCP:  None  Treatment Team: Attending Provider: Eileen Cordoba MD; Care Manager: Toy Henriquez RN; Consulting Provider: Logan Lu MD    Subjective:   Mr Margaret Garcia is a 27 yo male with a hx of iv drug abuse who was admitted for nausea and vomiting. He Stated symptoms began after injecting heroin. Patient noted to have AISSATOU with Scr of 4.92, oliguric. Despite hydration, his kidney status worsened and he was placed under HD. He tested positive for Hep C, and low complements levels. Has a family history of SLE on his mother. HD was started this admission. His latest HD was on 2018. Had renal biopsy on 18. Result is consistent with ATN as per nephrology. Was started on Zoloft during the stay. He continues to have good appetite and urine output. IJ catheter for dialysis has been removed. Objective:     Patient Vitals for the past 24 hrs:   Temp Pulse Resp BP SpO2   18 1054 98.3 °F (36.8 °C) 89 18 126/86 99 %   18 0724 98.2 °F (36.8 °C) 90 20 123/84 95 %   18 0426 97.8 °F (36.6 °C) 84 18 145/88 97 %   18 2335 97.8 °F (36.6 °C) 88 18 139/76 98 %   18 1916 98 °F (36.7 °C) 93 19 136/84 100 %   18 1609 98.2 °F (36.8 °C) (!) 103 18 122/82 98 %     Oxygen Therapy  O2 Sat (%): 99 % (18 1054)  Pulse via Oximetry: 72 beats per minute (18 1240)  O2 Device: Room air (18 1609)  O2 Flow Rate (L/min): 3 l/min (18 1201)  ETCO2 (mmHg): 0.4 mmHg (18 1201)    Intake/Output Summary (Last 24 hours) at 18 1215  Last data filed at 18 0810   Gross per 24 hour   Intake              240 ml   Output                0 ml   Net              240 ml         General:    Well nourished. Alert. Lying in bed without respiratory distress. heent-   normal  CV:   RRR. No murmur, rub, or gallop.   Lungs: Clear to auscultation bilaterally. No wheezing, rhonchi, or rales. No evidence of cellulitis at the previous site of IJ catheter. CNS- no focal neurological deficits  Abdomen:   Soft, nontender, nondistended. nontender biopsy site  Extremities: Warm and dry. No cyanosis or edema. Skin:     No rashes or jaundice. Data Review:  I have reviewed all labs, meds, telemetry events, and studies from the last 24 hours.     Recent Results (from the past 24 hour(s))   METABOLIC PANEL, BASIC    Collection Time: 04/13/18  5:37 AM   Result Value Ref Range    Sodium 144 136 - 145 mmol/L    Potassium 4.1 3.5 - 5.1 mmol/L    Chloride 112 (H) 98 - 107 mmol/L    CO2 23 21 - 32 mmol/L    Anion gap 9 7 - 16 mmol/L    Glucose 95 65 - 100 mg/dL    BUN 30 (H) 6 - 23 MG/DL    Creatinine 1.45 0.8 - 1.5 MG/DL    GFR est AA >60 >60 ml/min/1.73m2    GFR est non-AA 59 (L) >60 ml/min/1.73m2    Calcium 9.1 8.3 - 10.4 MG/DL        All Micro Results     None          Current Meds:  Current Facility-Administered Medications   Medication Dose Route Frequency    sertraline (ZOLOFT) tablet 25 mg  25 mg Oral DAILY    amLODIPine (NORVASC) tablet 10 mg  10 mg Oral DAILY    diphenhydrAMINE (BENADRYL) capsule 25 mg  25 mg Oral QHS PRN    pantoprazole (PROTONIX) tablet 40 mg  40 mg Oral ACB    traMADol (ULTRAM) tablet 50 mg  50 mg Oral Q6H PRN    calcium carbonate (TUMS) chewable tablet 400 mg [elemental]  400 mg Oral TID PRN    sodium chloride (NS) flush 5-10 mL  5-10 mL IntraVENous Q8H    sodium chloride (NS) flush 5-10 mL  5-10 mL IntraVENous PRN    acetaminophen (TYLENOL) tablet 650 mg  650 mg Oral Q4H PRN    ondansetron (ZOFRAN) injection 4 mg  4 mg IntraVENous Q4H PRN    heparin (porcine) injection 5,000 Units  5,000 Units SubCUTAneous Q8H             Assessment and Plan:     Hospital Problems as of 4/3/2018  Date Reviewed: 3/29/2018          Codes Class Noted - Resolved POA    Chronic hepatitis C without hepatic coma ([facility] [address] ICD-10-CM: B18.2  ICD-9-CM: 070.54  3/29/2018 - Present Unknown        Hepatitis ICD-10-CM: K75.9  ICD-9-CM: 573.3  3/27/2018 - Present Yes        Rhabdomyolysis ICD-10-CM: M62.82  ICD-9-CM: 728.88  3/27/2018 - Present Yes        * (Principal)ARF (acute renal failure) (HCC) ICD-10-CM: N17.9  ICD-9-CM: 584.9  3/26/2018 - Present Yes        Heroin abuse ICD-10-CM: F11.10  ICD-9-CM: 305.50  3/26/2018 - Present Yes        Tobacco abuse ICD-10-CM: Z72.0  ICD-9-CM: 305.1  3/26/2018 - Present Yes    GERD          PLAN:    AISSATOU- on dialysis-had biopsy on 4/6/18- improving creatinine after HD. Creatinine today shows further decrease in value without HD for the past 4 days. With good urine output, he shows signs of recovery. No evidence of fluid overload or depletion. Advised him to take oral fluid more to prevent volume depletion from diuresis in recovery phase. GERD- on protonix  Hepatitis C infection. This could be responsible for his AISSATOU. Possible MPGN. Poly substance abuse : I advised him to quit. Tobacco abuse : I advised him to quit.   htn- prn hydralazine, and amlodipine. BP is controlled now. On zoloft    DC planning/Dispo:  Creatinine is further down with no fluid overload. Acceptable electrolytes values. Patient is accepted to a rescue house locally. availability is for Monday. He will be assisted with transportation to clinic follow-ups from there as well. DVT ppx:  heparin    I have discussed the plan of care with patient and .      Signed:  Ellen Taylor MD

## 2018-04-14 PROCEDURE — 74011250637 HC RX REV CODE- 250/637: Performed by: FAMILY MEDICINE

## 2018-04-14 PROCEDURE — 65270000029 HC RM PRIVATE

## 2018-04-14 PROCEDURE — 74011250637 HC RX REV CODE- 250/637: Performed by: INTERNAL MEDICINE

## 2018-04-14 PROCEDURE — 74011250636 HC RX REV CODE- 250/636: Performed by: INTERNAL MEDICINE

## 2018-04-14 RX ADMIN — SERTRALINE HYDROCHLORIDE 25 MG: 50 TABLET ORAL at 09:40

## 2018-04-14 RX ADMIN — AMLODIPINE BESYLATE 10 MG: 10 TABLET ORAL at 09:40

## 2018-04-14 RX ADMIN — DIPHENHYDRAMINE HYDROCHLORIDE 25 MG: 25 CAPSULE ORAL at 22:32

## 2018-04-14 RX ADMIN — ACETAMINOPHEN 650 MG: 500 TABLET, FILM COATED ORAL at 19:22

## 2018-04-14 RX ADMIN — HEPARIN SODIUM 5000 UNITS: 5000 INJECTION, SOLUTION INTRAVENOUS; SUBCUTANEOUS at 05:08

## 2018-04-14 RX ADMIN — Medication 10 ML: at 05:03

## 2018-04-14 RX ADMIN — HEPARIN SODIUM 5000 UNITS: 5000 INJECTION, SOLUTION INTRAVENOUS; SUBCUTANEOUS at 15:20

## 2018-04-14 RX ADMIN — HEPARIN SODIUM 5000 UNITS: 5000 INJECTION, SOLUTION INTRAVENOUS; SUBCUTANEOUS at 22:30

## 2018-04-14 RX ADMIN — PANTOPRAZOLE SODIUM 40 MG: 40 TABLET, DELAYED RELEASE ORAL at 05:08

## 2018-04-14 NOTE — PROGRESS NOTES
Hospitalist Progress Note     Admit Date:  3/26/2018  8:10 AM   Name:  Kirby Fam   Age:  26 y.o.  :  1985   MRN:  [id number]   PCP:  None  Treatment Team: Attending Provider: Radha Gaspar MD; Care Manager: Marguerite Kimble RN; Consulting Provider: Marilynn Mcnair MD    Subjective:   Mr Gretel Lira is a 25 yo male with a hx of iv drug abuse who was admitted for nausea and vomiting. He Stated symptoms began after injecting heroin. Patient noted to have AISSATOU with Scr of 4.92, oliguric. Despite hydration, his kidney status worsened and he was placed under HD. He tested positive for Hep C, and low complements levels. Has a family history of SLE on his mother. HD was started this admission. His latest HD was on 2018. Had renal biopsy on 18. Result is consistent with ATN as per nephrology. Was started on Zoloft during the stay. IJ catheter for dialysis has been removed. Eating better. Drinking better. Objective:     Patient Vitals for the past 24 hrs:   Temp Pulse Resp BP SpO2   18 1110 98.2 °F (36.8 °C) 85 18 122/87 96 %   18 0721 97.7 °F (36.5 °C) 87 16 122/84 99 %   18 0332 97.5 °F (36.4 °C) 74 20 124/82 95 %   18 2353 98.2 °F (36.8 °C) 88 20 137/79 97 %   18 1835 98.6 °F (37 °C) 99 19 142/81 96 %   18 1528 98.4 °F (36.9 °C) 94 18 130/88 99 %     Oxygen Therapy  O2 Sat (%): 96 % (18 1110)  Pulse via Oximetry: 72 beats per minute (18 1240)  O2 Device: Room air (18 1609)  O2 Flow Rate (L/min): 3 l/min (18 1201)  ETCO2 (mmHg): 0.4 mmHg (18 1201)    Intake/Output Summary (Last 24 hours) at 18 1348  Last data filed at 18 0820   Gross per 24 hour   Intake              831 ml   Output                0 ml   Net              831 ml         General:    Well nourished. Alert. Lying in bed without respiratory distress. heent-   normal  CV:   RRR. No murmur, rub, or gallop.   Lungs:   Clear to auscultation bilaterally. No wheezing, rhonchi, or rales. No evidence of cellulitis at the previous site of IJ catheter. CNS- no focal neurological deficits  Abdomen:   Soft, nontender, nondistended. nontender biopsy site  Extremities: Warm and dry. No cyanosis or edema. Skin:     No rashes or jaundice. Data Review:  I have reviewed all labs, meds, telemetry events, and studies from the last 24 hours. No results found for this or any previous visit (from the past 24 hour(s)).      All Micro Results     None          Current Meds:  Current Facility-Administered Medications   Medication Dose Route Frequency    sertraline (ZOLOFT) tablet 25 mg  25 mg Oral DAILY    amLODIPine (NORVASC) tablet 10 mg  10 mg Oral DAILY    diphenhydrAMINE (BENADRYL) capsule 25 mg  25 mg Oral QHS PRN    pantoprazole (PROTONIX) tablet 40 mg  40 mg Oral ACB    traMADol (ULTRAM) tablet 50 mg  50 mg Oral Q6H PRN    calcium carbonate (TUMS) chewable tablet 400 mg [elemental]  400 mg Oral TID PRN    sodium chloride (NS) flush 5-10 mL  5-10 mL IntraVENous Q8H    sodium chloride (NS) flush 5-10 mL  5-10 mL IntraVENous PRN    acetaminophen (TYLENOL) tablet 650 mg  650 mg Oral Q4H PRN    ondansetron (ZOFRAN) injection 4 mg  4 mg IntraVENous Q4H PRN    heparin (porcine) injection 5,000 Units  5,000 Units SubCUTAneous Q8H             Assessment and Plan:     Hospital Problems as of 4/3/2018  Date Reviewed: 3/29/2018          Codes Class Noted - Resolved POA    [facility] ICD-10-CM: B18.2  ICD-9-CM: 070.54  3/29/2018 - Present Unknown        Hepatitis ICD-10-CM: K75.9  ICD-9-CM: 573.3  3/27/2018 - Present Yes        Rhabdomyolysis ICD-10-CM: M62.82  ICD-9-CM: 728.88  3/27/2018 - Present Yes        * (Principal)ARF (acute renal failure) (HCC) ICD-10-CM: N17.9  ICD-9-CM: 584.9  3/26/2018 - Present Yes        Heroin abuse ICD-10-CM: F11.10  ICD-9-CM: 305.50  3/26/2018 - Present Yes        Tobacco abuse ICD-10-CM: Z72.0  ICD-9-CM: 305.1  3/26/2018 - Present Yes    GERD          PLAN:    AISSATOU- was on dialysis-had biopsy on 4/6/18- improving creatinine after HD. Creatinine shows decrease in value without HD for the past 5 days. With good urine output, he shows signs of recovery. No evidence of fluid overload or depletion. Advised him to take oral fluid more to prevent volume depletion from diuresis in recovery phase. GERD- on protonix  Hepatitis C infection. This could be responsible for his AISSATOU. Possible MPGN. Poly substance abuse : I advised him to quit. Tobacco abuse : I advised him to quit.   htn- prn hydralazine, and amlodipine. BP is controlled now. On zoloft    DC planning/Dispo:  Creatinine is further down with no fluid overload. Acceptable electrolytes values. Patient is accepted to a rescue house locally. availability is for Monday. He will be assisted with transportation to clinic follow-ups from there as well. DVT ppx:  heparin    I have discussed the plan of care with patient and .      Signed:  Carrie Abreu MD

## 2018-04-15 PROCEDURE — 74011250637 HC RX REV CODE- 250/637: Performed by: INTERNAL MEDICINE

## 2018-04-15 PROCEDURE — 65270000029 HC RM PRIVATE

## 2018-04-15 PROCEDURE — 74011250636 HC RX REV CODE- 250/636: Performed by: INTERNAL MEDICINE

## 2018-04-15 PROCEDURE — 74011250637 HC RX REV CODE- 250/637: Performed by: FAMILY MEDICINE

## 2018-04-15 RX ADMIN — HEPARIN SODIUM 5000 UNITS: 5000 INJECTION, SOLUTION INTRAVENOUS; SUBCUTANEOUS at 13:09

## 2018-04-15 RX ADMIN — Medication 10 ML: at 05:22

## 2018-04-15 RX ADMIN — AMLODIPINE BESYLATE 10 MG: 10 TABLET ORAL at 10:54

## 2018-04-15 RX ADMIN — SERTRALINE HYDROCHLORIDE 25 MG: 50 TABLET ORAL at 10:54

## 2018-04-15 RX ADMIN — PANTOPRAZOLE SODIUM 40 MG: 40 TABLET, DELAYED RELEASE ORAL at 05:56

## 2018-04-15 RX ADMIN — ACETAMINOPHEN 650 MG: 500 TABLET, FILM COATED ORAL at 16:35

## 2018-04-15 RX ADMIN — Medication 10 ML: at 21:18

## 2018-04-15 RX ADMIN — HEPARIN SODIUM 5000 UNITS: 5000 INJECTION, SOLUTION INTRAVENOUS; SUBCUTANEOUS at 21:14

## 2018-04-15 RX ADMIN — HEPARIN SODIUM 5000 UNITS: 5000 INJECTION, SOLUTION INTRAVENOUS; SUBCUTANEOUS at 05:57

## 2018-04-15 RX ADMIN — ACETAMINOPHEN 650 MG: 500 TABLET, FILM COATED ORAL at 21:14

## 2018-04-15 NOTE — PROGRESS NOTES
Hospitalist Progress Note     Admit Date:  3/26/2018  8:10 AM   Name:  Heron Major   Age:  26 y.o.  :  1985   MRN:  [id number]   PCP:  None  Treatment Team: Attending Provider: Bartolo Guevara MD; Care Manager: Nicolette Yip RN; Consulting Provider: Rosetta Dawson MD    Subjective:   Mr Tawnya Mccurdy is a 25 yo male with a hx of iv drug abuse who was admitted for nausea and vomiting. He Stated symptoms began after injecting heroin. Patient noted to have AISSATOU with Scr of 4.92, oliguric. Despite hydration, his kidney status worsened and he was placed under HD. He tested positive for Hep C, and low complements levels. Has a family history of SLE on his mother. HD was started this admission. His latest HD was on 2018. Had renal biopsy on 18. Result is consistent with ATN as per nephrology. Was started on Zoloft during the stay. IJ catheter for dialysis has been removed. Patient has been eating and drinking well. No fever. No swelling. Objective:     Patient Vitals for the past 24 hrs:   Temp Pulse Resp BP SpO2   04/15/18 0730 97.5 °F (36.4 °C) 77 18 123/87 98 %   04/15/18 0558 97.6 °F (36.4 °C) 77 18 124/72 97 %   18 2303 98.1 °F (36.7 °C) 85 18 118/71 94 %   18 2007 98.2 °F (36.8 °C) 77 18 131/80 96 %   18 1458 98.4 °F (36.9 °C) (!) 101 18 138/88 96 %   18 1110 98.2 °F (36.8 °C) 85 18 122/87 96 %     Oxygen Therapy  O2 Sat (%): 98 % (04/15/18 0730)  Pulse via Oximetry: 72 beats per minute (18 1240)  O2 Device: Room air (04/15/18 0730)  O2 Flow Rate (L/min): 3 l/min (18 1201)  ETCO2 (mmHg): 0.4 mmHg (18 1201)    Intake/Output Summary (Last 24 hours) at 04/15/18 0948  Last data filed at 18 1812   Gross per 24 hour   Intake              595 ml   Output                0 ml   Net              595 ml         General:    Well nourished. Alert. Lying in bed without respiratory distress. heent-   normal  CV:   RRR.   No murmur, rub, or gallop. Lungs:   Clear to auscultation bilaterally. No wheezing, rhonchi, or rales. No evidence of cellulitis at the previous site of IJ catheter. CNS- no focal neurological deficits  Abdomen:   Soft, nontender, nondistended. nontender biopsy site  Extremities: Warm and dry. No cyanosis or edema. Skin:     No rashes or jaundice. Data Review:  I have reviewed all labs, meds, telemetry events, and studies from the last 24 hours. No results found for this or any previous visit (from the past 24 hour(s)).      All Micro Results     None          Current Meds:  Current Facility-Administered Medications   Medication Dose Route Frequency    sertraline (ZOLOFT) tablet 25 mg  25 mg Oral DAILY    amLODIPine (NORVASC) tablet 10 mg  10 mg Oral DAILY    diphenhydrAMINE (BENADRYL) capsule 25 mg  25 mg Oral QHS PRN    pantoprazole (PROTONIX) tablet 40 mg  40 mg Oral ACB    traMADol (ULTRAM) tablet 50 mg  50 mg Oral Q6H PRN    calcium carbonate (TUMS) chewable tablet 400 mg [elemental]  400 mg Oral TID PRN    sodium chloride (NS) flush 5-10 mL  5-10 mL IntraVENous Q8H    sodium chloride (NS) flush 5-10 mL  5-10 mL IntraVENous PRN    acetaminophen (TYLENOL) tablet 650 mg  650 mg Oral Q4H PRN    ondansetron (ZOFRAN) injection 4 mg  4 mg IntraVENous Q4H PRN    heparin (porcine) injection 5,000 Units  5,000 Units SubCUTAneous Q8H             Assessment and Plan:     Hospital Problems as of 4/3/2018  Date Reviewed: 3/29/2018          Codes Class Noted - Resolved POA    Chronic hepatitis C without hepatic coma ([facility] [address] ICD-10-CM: B18.2  ICD-9-CM: 070.54  3/29/2018 - Present Unknown        Hepatitis ICD-10-CM: K75.9  ICD-9-CM: 573.3  3/27/2018 - Present Yes        Rhabdomyolysis ICD-10-CM: M62.82  ICD-9-CM: 728.88  3/27/2018 - Present Yes        * (Principal)ARF (acute renal failure) (HCC) ICD-10-CM: N17.9  ICD-9-CM: 584.9  3/26/2018 - Present Yes        Heroin abuse ICD-10-CM: F11.10  ICD-9-CM: 305.50  3/26/2018 - Present Yes        Tobacco abuse ICD-10-CM: Z72.0  ICD-9-CM: 305.1  3/26/2018 - Present Yes    GERD          PLAN:    AISSATOU- was on dialysis-had biopsy on 4/6/18- improving creatinine after HD. Creatinine shows decrease in value without HD for the past 6 days. Creatinine is back to normal adequate urine output. HD catheter was removed. GERD- on protonix  Hepatitis C infection. This could be responsible for his AISSATOU. Possible MPGN. Poly substance abuse : I advised him to quit. He promised to do so. Tobacco abuse : I advised him to quit. He promised to do so.   htn- prn hydralazine, and amlodipine. BP is controlled now. On zoloft    DC planning/Dispo:  Creatinine is further down with no fluid overload. Acceptable electrolytes values. Patient is accepted to a rescue house locally. availability is for Monday. He will be assisted with transportation to clinic follow-ups from there as well. DVT ppx:  heparin    I have discussed the plan of care with patient.      Signed:  Carrie Abreu MD

## 2018-04-16 VITALS
OXYGEN SATURATION: 99 % | WEIGHT: 147.8 LBS | DIASTOLIC BLOOD PRESSURE: 84 MMHG | SYSTOLIC BLOOD PRESSURE: 128 MMHG | TEMPERATURE: 98 F | HEIGHT: 67 IN | HEART RATE: 90 BPM | RESPIRATION RATE: 18 BRPM | BODY MASS INDEX: 23.2 KG/M2

## 2018-04-16 PROCEDURE — 74011250637 HC RX REV CODE- 250/637: Performed by: FAMILY MEDICINE

## 2018-04-16 PROCEDURE — 74011250637 HC RX REV CODE- 250/637: Performed by: INTERNAL MEDICINE

## 2018-04-16 PROCEDURE — 74011250636 HC RX REV CODE- 250/636: Performed by: INTERNAL MEDICINE

## 2018-04-16 RX ORDER — AMLODIPINE BESYLATE 10 MG/1
10 TABLET ORAL DAILY
Qty: 15 TAB | Refills: 0 | Status: SHIPPED | OUTPATIENT
Start: 2018-04-16 | End: 2018-05-01

## 2018-04-16 RX ORDER — SERTRALINE HYDROCHLORIDE 25 MG/1
25 TABLET, FILM COATED ORAL DAILY
Qty: 15 TAB | Refills: 0 | Status: SHIPPED | OUTPATIENT
Start: 2018-04-16 | End: 2018-05-01

## 2018-04-16 RX ADMIN — PANTOPRAZOLE SODIUM 40 MG: 40 TABLET, DELAYED RELEASE ORAL at 05:31

## 2018-04-16 RX ADMIN — SERTRALINE HYDROCHLORIDE 25 MG: 50 TABLET ORAL at 09:07

## 2018-04-16 RX ADMIN — AMLODIPINE BESYLATE 10 MG: 10 TABLET ORAL at 09:07

## 2018-04-16 RX ADMIN — Medication 10 ML: at 05:34

## 2018-04-16 RX ADMIN — HEPARIN SODIUM 5000 UNITS: 5000 INJECTION, SOLUTION INTRAVENOUS; SUBCUTANEOUS at 05:31

## 2018-04-16 NOTE — PROGRESS NOTES
Care Management Interventions  PCP Verified by CM:  ([facility])  Mode of Transport at Discharge:  (staff from [address] [place] [address] )  Transition of [address] ([address]): Discharge Planning  Discharge Durable Medical Equipment: No  Physical Therapy Consult: Yes  Occupational Therapy Consult: No  Speech Therapy Consult: No  Current Support Network: Shelter (Pt coordinated with [address] [place] [address] and he will go to one of their facilities at D/C today.)  Plan discussed with Pt/Family/Caregiver: Yes  Freedom of Choice Offered: Yes  Discharge Location  Discharge Placement: Shelter (Provided a voucher for discharge meds.)  Pt has communicated with [address] [place] [address] and has made a plan with them for housing and support after his discharge today. LMSW placed a follow up call to Yuniel Hinton at [facility] to update him regarding pt being accepted at [facility] so he would not need [facility] bed today. Pt instructed to get meds filled at [facility] with voucher. [facility] staff arrived to  pt from hospital.  Pt did not need out pt hemodialysis at discharge.

## 2018-04-16 NOTE — PROGRESS NOTES
Discharge instructions and prescriptions given and reviewed with pt, verbalizes understanding, pt will need medication vouchers and a taxi ride to the [facility], SW assisting with this.

## 2018-04-16 NOTE — DISCHARGE INSTRUCTIONS
DISCHARGE SUMMARY from Nurse    PATIENT INSTRUCTIONS:    After general anesthesia or intravenous sedation, for 24 hours or while taking prescription Narcotics:  · Limit your activities  · Do not drive and operate hazardous machinery  · Do not make important personal or business decisions  · Do  not drink alcoholic beverages  · If you have not urinated within 8 hours after discharge, please contact your surgeon on call. Report the following to your surgeon:  · Excessive pain, swelling, redness or odor of or around the surgical area  · Temperature over 100.5  · Nausea and vomiting lasting longer than 4 hours or if unable to take medications  · Any signs of decreased circulation or nerve impairment to extremity: change in color, persistent  numbness, tingling, coldness or increase pain  · Any questions    What to do at Home:  Recommended activity: Activity as tolerated    If you experience any of the following symptoms fever greater than 101, decreased urinary output, nausea/vomiting, or pain, please follow up with your primary care physicain. *  Please give a list of your current medications to your Primary Care Provider. *  Please update this list whenever your medications are discontinued, doses are      changed, or new medications (including over-the-counter products) are added. *  Please carry medication information at all times in case of emergency situations. These are general instructions for a healthy lifestyle:    No smoking/ No tobacco products/ Avoid exposure to second hand smoke  Surgeon General's Warning:  Quitting smoking now greatly reduces serious risk to your health.     Obesity, smoking, and sedentary lifestyle greatly increases your risk for illness    A healthy diet, regular physical exercise & weight monitoring are important for maintaining a healthy lifestyle    You may be retaining fluid if you have a history of heart failure or if you experience any of the following symptoms: Weight gain of 3 pounds or more overnight or 5 pounds in a week, increased swelling in our hands or feet or shortness of breath while lying flat in bed. Please call your doctor as soon as you notice any of these symptoms; do not wait until your next office visit. Recognize signs and symptoms of STROKE:    F-face looks uneven    A-arms unable to move or move unevenly    S-speech slurred or non-existent    T-time-call 911 as soon as signs and symptoms begin-DO NOT go       Back to bed or wait to see if you get better-TIME IS BRAIN. Warning Signs of HEART ATTACK     Call 911 if you have these symptoms:   Chest discomfort. Most heart attacks involve discomfort in the center of the chest that lasts more than a few minutes, or that goes away and comes back. It can feel like uncomfortable pressure, squeezing, fullness, or pain.  Discomfort in other areas of the upper body. Symptoms can include pain or discomfort in one or both arms, the back, neck, jaw, or stomach.  Shortness of breath with or without chest discomfort.  Other signs may include breaking out in a cold sweat, nausea, or lightheadedness. Don't wait more than five minutes to call 911 - MINUTES MATTER! Fast action can save your life. Calling 911 is almost always the fastest way to get lifesaving treatment. Emergency Medical Services staff can begin treatment when they arrive -- up to an hour sooner than if someone gets to the hospital by car. The discharge information has been reviewed with the patient. The patient verbalized understanding. Discharge medications reviewed with the patient and appropriate educational materials and side effects teaching were provided.   ___________________________________________________________________________________________________________________________________

## 2018-06-15 ENCOUNTER — HOSPITAL ENCOUNTER (EMERGENCY)
Age: 33
Discharge: HOME | End: 2018-06-15
Attending: EMERGENCY MEDICINE
Payer: SELF-PAY

## 2018-06-15 VITALS
HEART RATE: 135 BPM | WEIGHT: 157 LBS | SYSTOLIC BLOOD PRESSURE: 142 MMHG | RESPIRATION RATE: 16 BRPM | HEIGHT: 66 IN | OXYGEN SATURATION: 95 % | DIASTOLIC BLOOD PRESSURE: 77 MMHG | BODY MASS INDEX: 25.23 KG/M2 | TEMPERATURE: 98.9 F

## 2018-06-15 DIAGNOSIS — E86.0 DEHYDRATION: Primary | ICD-10-CM

## 2018-06-15 DIAGNOSIS — R00.0 TACHYCARDIA: ICD-10-CM

## 2018-06-15 DIAGNOSIS — F15.10 METHAMPHETAMINE ABUSE (HCC): ICD-10-CM

## 2018-06-15 LAB
ALBUMIN SERPL-MCNC: 4.8 G/DL (ref 3.5–5)
ALBUMIN/GLOB SERPL: 1.2 {RATIO} (ref 1.2–3.5)
ALP SERPL-CCNC: 57 U/L (ref 50–136)
ALT SERPL-CCNC: 38 U/L (ref 12–65)
AMPHET UR QL SCN: POSITIVE
ANION GAP SERPL CALC-SCNC: 15 MMOL/L (ref 7–16)
AST SERPL-CCNC: 22 U/L (ref 15–37)
BARBITURATES UR QL SCN: NEGATIVE
BASOPHILS # BLD: 0 K/UL (ref 0–0.2)
BASOPHILS NFR BLD: 0 % (ref 0–2)
BENZODIAZ UR QL: NEGATIVE
BILIRUB SERPL-MCNC: 0.3 MG/DL (ref 0.2–1.1)
BUN SERPL-MCNC: 15 MG/DL (ref 6–23)
CALCIUM SERPL-MCNC: 10 MG/DL (ref 8.3–10.4)
CANNABINOIDS UR QL SCN: NEGATIVE
CHLORIDE SERPL-SCNC: 104 MMOL/L (ref 98–107)
CO2 SERPL-SCNC: 22 MMOL/L (ref 21–32)
COCAINE UR QL SCN: NEGATIVE
CREAT SERPL-MCNC: 1.38 MG/DL (ref 0.8–1.5)
DIFFERENTIAL METHOD BLD: ABNORMAL
EOSINOPHIL # BLD: 0 K/UL (ref 0–0.8)
EOSINOPHIL NFR BLD: 0 % (ref 0.5–7.8)
ERYTHROCYTE [DISTWIDTH] IN BLOOD BY AUTOMATED COUNT: 14.5 % (ref 11.9–14.6)
GLOBULIN SER CALC-MCNC: 4.1 G/DL (ref 2.3–3.5)
GLUCOSE SERPL-MCNC: 111 MG/DL (ref 65–100)
HCT VFR BLD AUTO: 45.3 % (ref 41.1–50.3)
HGB BLD-MCNC: 16.2 G/DL (ref 13.6–17.2)
IMM GRANULOCYTES # BLD: 0 K/UL (ref 0–0.5)
IMM GRANULOCYTES NFR BLD AUTO: 0 % (ref 0–5)
LYMPHOCYTES # BLD: 1.3 K/UL (ref 0.5–4.6)
LYMPHOCYTES NFR BLD: 10 % (ref 13–44)
MCH RBC QN AUTO: 32 PG (ref 26.1–32.9)
MCHC RBC AUTO-ENTMCNC: 35.8 G/DL (ref 31.4–35)
MCV RBC AUTO: 89.3 FL (ref 79.6–97.8)
METHADONE UR QL: NEGATIVE
MONOCYTES # BLD: 0.3 K/UL (ref 0.1–1.3)
MONOCYTES NFR BLD: 2 % (ref 4–12)
NEUTS SEG # BLD: 10.7 K/UL (ref 1.7–8.2)
NEUTS SEG NFR BLD: 88 % (ref 43–78)
OPIATES UR QL: NEGATIVE
PCP UR QL: NEGATIVE
PLATELET # BLD AUTO: 286 K/UL (ref 150–450)
PMV BLD AUTO: 9 FL (ref 10.8–14.1)
POTASSIUM SERPL-SCNC: 4.9 MMOL/L (ref 3.5–5.1)
PROT SERPL-MCNC: 8.9 G/DL (ref 6.3–8.2)
RBC # BLD AUTO: 5.07 M/UL (ref 4.23–5.67)
SODIUM SERPL-SCNC: 141 MMOL/L (ref 136–145)
WBC # BLD AUTO: 12.3 K/UL (ref 4.3–11.1)

## 2018-06-15 PROCEDURE — 81003 URINALYSIS AUTO W/O SCOPE: CPT | Performed by: EMERGENCY MEDICINE

## 2018-06-15 PROCEDURE — 99285 EMERGENCY DEPT VISIT HI MDM: CPT | Performed by: EMERGENCY MEDICINE

## 2018-06-15 PROCEDURE — 93005 ELECTROCARDIOGRAM TRACING: CPT | Performed by: EMERGENCY MEDICINE

## 2018-06-15 PROCEDURE — 85025 COMPLETE CBC W/AUTO DIFF WBC: CPT | Performed by: EMERGENCY MEDICINE

## 2018-06-15 PROCEDURE — 80307 DRUG TEST PRSMV CHEM ANLYZR: CPT | Performed by: EMERGENCY MEDICINE

## 2018-06-15 PROCEDURE — 80053 COMPREHEN METABOLIC PANEL: CPT | Performed by: EMERGENCY MEDICINE

## 2018-06-15 PROCEDURE — 74011250636 HC RX REV CODE- 250/636: Performed by: EMERGENCY MEDICINE

## 2018-06-15 PROCEDURE — 96360 HYDRATION IV INFUSION INIT: CPT | Performed by: EMERGENCY MEDICINE

## 2018-06-15 PROCEDURE — 51798 US URINE CAPACITY MEASURE: CPT

## 2018-06-15 RX ADMIN — SODIUM CHLORIDE 1000 ML: 900 INJECTION, SOLUTION INTRAVENOUS at 04:58

## 2018-06-15 NOTE — ED PROVIDER NOTES
HPI Comments: 26-year-old male presents with complaints of generalized malaise and fatigue along with  Generalized headache. He's also noted a decrease in his urine output  Onset over 2-3 days. He is concerned because this feels similar to the last time he had to be admitted to the hospital when his was in acute renal failure with creatinine 10    History of IV drug abuse  Also found to be hep C positive. At the last admission. Patient is a 26 y.o. male presenting with headaches. The history is provided by the patient. Headache    This is a recurrent problem. The current episode started more than 2 days ago. The problem occurs constantly. The problem has been gradually worsening. The headache is aggravated by nothing. The pain is located in the generalized region. The quality of the pain is described as dull and throbbing. The pain is moderate. Associated symptoms include malaise/fatigue. Pertinent negatives include no fever, no chest pressure, no orthopnea, no palpitations, no syncope, no shortness of breath, no dizziness, no visual change, no nausea and no vomiting. He has tried nothing for the symptoms. No past medical history on file. Past Surgical History:   Procedure Laterality Date    HX TONSIL AND ADENOIDECTOMY           No family history on file. Social History     Social History    Marital status: SINGLE     Spouse name: N/A    Number of children: N/A    Years of education: N/A     Occupational History    Not on file. Social History Main Topics    Smoking status: Current Every Day Smoker     Packs/day: 0.25    Smokeless tobacco: Not on file    Alcohol use No    Drug use: Not on file    Sexual activity: No     Other Topics Concern    Not on file     Social History Narrative         ALLERGIES: Pcn [penicillins]    Review of Systems   Constitutional: Positive for malaise/fatigue. Negative for activity change, chills, diaphoresis and fever.    HENT: Negative for dental problem, hearing loss, nosebleeds, rhinorrhea and sore throat. Eyes: Negative for pain, discharge, redness and visual disturbance. Respiratory: Negative for cough, chest tightness and shortness of breath. Cardiovascular: Negative for chest pain, palpitations, orthopnea, leg swelling and syncope. Gastrointestinal: Negative for abdominal pain, constipation, diarrhea, nausea and vomiting. Endocrine: Negative for cold intolerance, heat intolerance, polydipsia and polyuria. Genitourinary: Negative for dysuria and flank pain. Musculoskeletal: Negative for arthralgias, back pain, joint swelling, myalgias and neck pain. Skin: Negative for pallor and rash. Allergic/Immunologic: Negative for environmental allergies and food allergies. Neurological: Positive for headaches. Negative for dizziness, tremors, light-headedness and numbness. Hematological: Negative for adenopathy. Does not bruise/bleed easily. Psychiatric/Behavioral: Negative for confusion and dysphoric mood. The patient is not nervous/anxious and is not hyperactive. All other systems reviewed and are negative. Vitals:    06/15/18 0402   BP: 126/86   Pulse: (!) 125   Resp: 18   Temp: 98.2 °F (36.8 °C)   SpO2: 97%   Weight: 71.2 kg (157 lb)   Height: 5' 6\" (1.676 m)            Physical Exam   Constitutional: He is oriented to person, place, and time. He appears well-developed and well-nourished. No distress. HENT:   Head: Normocephalic and atraumatic. Mouth/Throat: Oropharynx is clear and moist.   Eyes: Conjunctivae and EOM are normal. Pupils are equal, round, and reactive to light. Right eye exhibits no discharge. Left eye exhibits no discharge. No scleral icterus. Neck: Normal range of motion. Neck supple. No JVD present. Cardiovascular: Regular rhythm, normal heart sounds and intact distal pulses. Tachycardia present. Exam reveals no gallop and no friction rub. No murmur heard.   Pulmonary/Chest: Effort normal and breath sounds normal. No respiratory distress. He has no wheezes. Abdominal: Soft. Bowel sounds are normal. He exhibits no distension. There is no hepatosplenomegaly. There is no tenderness. There is no rebound and no guarding. Musculoskeletal: Normal range of motion. He exhibits no edema or tenderness. Lymphadenopathy:     He has no cervical adenopathy. Neurological: He is alert and oriented to person, place, and time. He has normal strength. No cranial nerve deficit or sensory deficit. He exhibits normal muscle tone. GCS eye subscore is 4. GCS verbal subscore is 5. GCS motor subscore is 6. Skin: Skin is warm and dry. No rash noted. He is not diaphoretic. No erythema. Psychiatric: His speech is normal and behavior is normal. Judgment and thought content normal. His affect is blunt. Cognition and memory are normal.   Nursing note and vitals reviewed. MDM  Number of Diagnoses or Management Options  Dehydration: new and requires workup  Methamphetamine abuse: new and requires workup  Tachycardia: new and requires workup  Diagnosis management comments: 77-year-old male presents with complaints of increasing thirst and concern for possible kidney injury with decreased urine output  Patient was admitted with an episode of renal failure after prolonged substance abuse. Today, the patient is found to have a creatinine of 1.3.. He is making urine. The urine dip was negative for blood and only had trace ketones. Unfortunately, his UDS is positive for methamphetamine. This is most likely explanation for the patient's tachycardia. Given his only mild degree of dehydration    The patient has remained in a sinus tachycardia. He has had no other evidence of ectopy or dysrhythmia    I will discharge patient home. Have provided.   Follow-up Dr. Keiko Rojas and to stop using illegal drugs       Amount and/or Complexity of Data Reviewed  Clinical lab tests: ordered and reviewed  Tests in the medicine section of CPT®: ordered and reviewed  Review and summarize past medical records: yes    Risk of Complications, Morbidity, and/or Mortality  Presenting problems: moderate  Diagnostic procedures: moderate  Management options: moderate  General comments: Elements of this note have been dictated via voice recognition software. Text and phrases may be limited by the accuracy of the software. The chart has been reviewed, but errors may still be present.       Patient Progress  Patient progress: improved        ED Course       Procedures

## 2018-06-15 NOTE — ED NOTES
Pt was laying in bed and rn noticed hr on the monitor elevated. Performed 12 lead ekg and gave to md. No new orders.

## 2018-06-15 NOTE — ED NOTES
Pt c/o headache since march. Was better while in the hospital being treated for renal failure, but now he can't tolerating the pain. Denies cp or sob. Discussed poc. Call bell in reach.

## 2018-06-15 NOTE — DISCHARGE INSTRUCTIONS
Dehydration: Care Instructions  Your Care Instructions  Dehydration happens when your body loses too much fluid. This might happen when you do not drink enough water or you lose large amounts of fluids from your body because of diarrhea, vomiting, or sweating. Severe dehydration can be life-threatening. Water and minerals called electrolytes help put your body fluids back in balance. Learn the early signs of fluid loss, and drink more fluids to prevent dehydration. Follow-up care is a key part of your treatment and safety. Be sure to make and go to all appointments, and call your doctor if you are having problems. It's also a good idea to know your test results and keep a list of the medicines you take. How can you care for yourself at home? · To prevent dehydration, drink plenty of fluids, enough so that your urine is light yellow or clear like water. Choose water and other caffeine-free clear liquids until you feel better. If you have kidney, heart, or liver disease and have to limit fluids, talk with your doctor before you increase the amount of fluids you drink. · If you do not feel like eating or drinking, try taking small sips of water, sports drinks, or other rehydration drinks. · Get plenty of rest.  To prevent dehydration  · Add more fluids to your diet and daily routine, unless your doctor has told you not to. · During hot weather, drink more fluids. Drink even more fluids if you exercise a lot. Stay away from drinks with alcohol or caffeine. · Watch for the symptoms of dehydration. These include:  ¨ A dry, sticky mouth. ¨ Dark yellow urine, and not much of it. ¨ Dry and sunken eyes. ¨ Feeling very tired. · Learn what problems can lead to dehydration. These include:  ¨ Diarrhea, fever, and vomiting. ¨ Any illness with a fever, such as pneumonia or the flu. ¨ Activities that cause heavy sweating, such as endurance races and heavy outdoor work in hot or humid weather.   ¨ Alcohol or drug abuse or withdrawal.  ¨ Certain medicines, such as cold and allergy pills (antihistamines), diet pills (diuretics), and laxatives. ¨ Certain diseases, such as diabetes, cancer, and heart or kidney disease. When should you call for help? Call 911 anytime you think you may need emergency care. For example, call if:  ? · You passed out (lost consciousness). ?Call your doctor now or seek immediate medical care if:  ? · You are confused and cannot think clearly. ? · You are dizzy or lightheaded, or you feel like you may faint. ? · You have signs of needing more fluids. You have sunken eyes and a dry mouth, and you pass only a little dark urine. ? · You cannot keep fluids down. ? Watch closely for changes in your health, and be sure to contact your doctor if:  ? · You are not making tears. ? · Your skin is very dry and sags slowly back into place after you pinch it. ? · Your mouth and eyes are very dry. Where can you learn more? Go to http://laly-jurgen.info/. Enter G681 in the search box to learn more about \"Dehydration: Care Instructions. \"  Current as of: March 20, 2017  Content Version: 11.4  © 5102-8647 [employer]. Care instructions adapted under license by [employer] (which disclaims liability or warranty for this information). If you have questions about a medical condition or this instruction, always ask your healthcare professional. [place] [address] any warranty or liability for your use of this information.

## 2018-06-15 NOTE — ED NOTES
I have reviewed discharge instructions with the patient. The patient verbalized understanding. Patient left ED via Discharge Method: ambulatory to Home with self    Opportunity for questions and clarification provided. Patient given 0 scripts. To continue your aftercare when you leave the hospital, you may receive an automated call from our care team to check in on how you are doing. This is a free service and part of our promise to provide the best care and service to meet your aftercare needs.  If you have questions, or wish to unsubscribe from this service please call [phone]. Thank you for Choosing our [facility] Emergency Department.

## 2018-06-16 LAB
ATRIAL RATE: 138 BPM
CALCULATED P AXIS, ECG09: 73 DEGREES
CALCULATED R AXIS, ECG10: 86 DEGREES
CALCULATED T AXIS, ECG11: 65 DEGREES
DIAGNOSIS, 93000: NORMAL
P-R INTERVAL, ECG05: 124 MS
Q-T INTERVAL, ECG07: 274 MS
QRS DURATION, ECG06: 80 MS
QTC CALCULATION (BEZET), ECG08: 415 MS
VENTRICULAR RATE, ECG03: 138 BPM

## 2018-09-03 ENCOUNTER — APPOINTMENT (OUTPATIENT)
Dept: GENERAL RADIOLOGY | Age: 33
End: 2018-09-03
Attending: EMERGENCY MEDICINE
Payer: SELF-PAY

## 2018-09-03 ENCOUNTER — HOSPITAL ENCOUNTER (EMERGENCY)
Age: 33
Discharge: HOME | End: 2018-09-04
Attending: EMERGENCY MEDICINE
Payer: SELF-PAY

## 2018-09-03 VITALS
TEMPERATURE: 98.2 F | OXYGEN SATURATION: 96 % | DIASTOLIC BLOOD PRESSURE: 94 MMHG | WEIGHT: 150 LBS | BODY MASS INDEX: 23.54 KG/M2 | HEART RATE: 96 BPM | RESPIRATION RATE: 16 BRPM | HEIGHT: 67 IN | SYSTOLIC BLOOD PRESSURE: 158 MMHG

## 2018-09-03 DIAGNOSIS — F19.20 DRUG ADDICTION (HCC): Primary | ICD-10-CM

## 2018-09-03 LAB
ALBUMIN SERPL-MCNC: 4.1 G/DL (ref 3.5–5)
ALBUMIN/GLOB SERPL: 1 {RATIO} (ref 1.2–3.5)
ALP SERPL-CCNC: 56 U/L (ref 50–136)
ALT SERPL-CCNC: 25 U/L (ref 12–65)
ANION GAP SERPL CALC-SCNC: 9 MMOL/L (ref 7–16)
AST SERPL-CCNC: 20 U/L (ref 15–37)
BASOPHILS # BLD: 0 K/UL (ref 0–0.2)
BASOPHILS NFR BLD: 1 % (ref 0–2)
BILIRUB SERPL-MCNC: 0.4 MG/DL (ref 0.2–1.1)
BUN SERPL-MCNC: 17 MG/DL (ref 6–23)
CALCIUM SERPL-MCNC: 8.6 MG/DL (ref 8.3–10.4)
CHLORIDE SERPL-SCNC: 104 MMOL/L (ref 98–107)
CO2 SERPL-SCNC: 27 MMOL/L (ref 21–32)
CREAT SERPL-MCNC: 0.8 MG/DL (ref 0.8–1.5)
DIFFERENTIAL METHOD BLD: ABNORMAL
EOSINOPHIL # BLD: 0 K/UL (ref 0–0.8)
EOSINOPHIL NFR BLD: 0 % (ref 0.5–7.8)
ERYTHROCYTE [DISTWIDTH] IN BLOOD BY AUTOMATED COUNT: 12.1 %
GLOBULIN SER CALC-MCNC: 4.1 G/DL (ref 2.3–3.5)
GLUCOSE SERPL-MCNC: 99 MG/DL (ref 65–100)
HCT VFR BLD AUTO: 41.8 % (ref 41.1–50.3)
HGB BLD-MCNC: 14.4 G/DL (ref 13.6–17.2)
IMM GRANULOCYTES # BLD: 0 K/UL (ref 0–0.5)
IMM GRANULOCYTES NFR BLD AUTO: 0 % (ref 0–5)
LYMPHOCYTES # BLD: 1.4 K/UL (ref 0.5–4.6)
LYMPHOCYTES NFR BLD: 25 % (ref 13–44)
MCH RBC QN AUTO: 31.1 PG (ref 26.1–32.9)
MCHC RBC AUTO-ENTMCNC: 34.4 G/DL (ref 31.4–35)
MCV RBC AUTO: 90.3 FL (ref 79.6–97.8)
MONOCYTES # BLD: 0.4 K/UL (ref 0.1–1.3)
MONOCYTES NFR BLD: 8 % (ref 4–12)
NEUTS SEG # BLD: 3.7 K/UL (ref 1.7–8.2)
NEUTS SEG NFR BLD: 67 % (ref 43–78)
NRBC # BLD: 0 K/UL (ref 0–0.2)
PLATELET # BLD AUTO: 301 K/UL (ref 150–450)
PMV BLD AUTO: 8.8 FL (ref 9.4–12.3)
POTASSIUM SERPL-SCNC: 3.9 MMOL/L (ref 3.5–5.1)
PROT SERPL-MCNC: 8.2 G/DL (ref 6.3–8.2)
RBC # BLD AUTO: 4.63 M/UL (ref 4.23–5.6)
SODIUM SERPL-SCNC: 140 MMOL/L (ref 136–145)
WBC # BLD AUTO: 5.5 K/UL (ref 4.3–11.1)

## 2018-09-03 PROCEDURE — 99285 EMERGENCY DEPT VISIT HI MDM: CPT | Performed by: EMERGENCY MEDICINE

## 2018-09-03 PROCEDURE — 71046 X-RAY EXAM CHEST 2 VIEWS: CPT

## 2018-09-03 PROCEDURE — 80053 COMPREHEN METABOLIC PANEL: CPT

## 2018-09-03 PROCEDURE — 85025 COMPLETE CBC W/AUTO DIFF WBC: CPT

## 2018-09-03 NOTE — ED PROVIDER NOTES
HPI Comments: Patient is a 25 yo male who presents requesting drug rehab. States he has been using heroin for the past 5 years and states he wants to quit. States no further concerns other than some swollen lymph nodes under his armpits which he states have been there for a while. States no chest pain or SOB, no nausea or vomiting, no abdominal pain, no further complaints. Overall patient is well appearing, NAD Patient is a 26 y.o. male presenting with drug problem. The history is provided by the patient. No  was used. Drug Problem There areno weakness present at this time. Pertinent negatives include no fever, no nausea and no vomiting. History reviewed. No pertinent past medical history. Past Surgical History:  
Procedure Laterality Date  HX TONSIL AND ADENOIDECTOMY No family history on file. Social History Social History  Marital status: SINGLE Spouse name: N/A  
 Number of children: N/A  
 Years of education: N/A Occupational History  Not on file. Social History Main Topics  Smoking status: Current Every Day Smoker Packs/day: 0.25  Smokeless tobacco: Not on file  Alcohol use No  
 Drug use: Yes Special: Methamphetamines, Heroin  Sexual activity: No  
 
Other Topics Concern  Not on file Social History Narrative ALLERGIES: Pcn [penicillins] Review of Systems Constitutional: Negative for chills and fever. HENT: Negative for rhinorrhea and sore throat. Eyes: Negative for visual disturbance. Respiratory: Negative for cough and shortness of breath. Cardiovascular: Negative for chest pain and leg swelling. Gastrointestinal: Negative for abdominal pain, diarrhea, nausea and vomiting. Genitourinary: Negative for dysuria. Musculoskeletal: Negative for back pain and neck pain. Skin: Negative for rash. Neurological: Negative for weakness and headaches. Psychiatric/Behavioral: The patient is not nervous/anxious. Vitals:  
 09/03/18 1518 BP: 149/90 Pulse: (!) 105 Resp: 16 Temp: 98.3 °F (36.8 °C) SpO2: 93% Weight: 68 kg (150 lb) Height: 5' 6.5\" (1.689 m) Physical Exam  
Constitutional: He is oriented to person, place, and time. He appears well-developed and well-nourished. HENT:  
Head: Normocephalic. Right Ear: External ear normal.  
Left Ear: External ear normal.  
Eyes: Conjunctivae and EOM are normal. Pupils are equal, round, and reactive to light. Neck: Normal range of motion. Neck supple. No tracheal deviation present. Cardiovascular: Normal rate, regular rhythm, normal heart sounds and intact distal pulses. No murmur heard. Pulmonary/Chest: Effort normal and breath sounds normal. No respiratory distress. Abdominal: Soft. There is no tenderness. Musculoskeletal: Normal range of motion. Mildly swollen lymph nodes in axillary region bilaterally Neurological: He is alert and oriented to person, place, and time. No cranial nerve deficit. Skin: No rash noted. Nursing note and vitals reviewed. MDM Number of Diagnoses or Management Options Amount and/or Complexity of Data Reviewed Clinical lab tests: ordered and reviewed Tests in the radiology section of CPT®: ordered and reviewed Review and summarize past medical records: yes Risk of Complications, Morbidity, and/or Mortality Presenting problems: moderate Diagnostic procedures: moderate Management options: moderate Patient Progress Patient progress: stable ED Course Procedures Recent Results (from the past 12 hour(s)) CBC WITH AUTOMATED DIFF Collection Time: 09/03/18  5:57 PM  
Result Value Ref Range WBC 5.5 4.3 - 11.1 K/uL  
 RBC 4.63 4.23 - 5.6 M/uL  
 HGB 14.4 13.6 - 17.2 g/dL HCT 41.8 41.1 - 50.3 % MCV 90.3 79.6 - 97.8 FL  
 MCH 31.1 26.1 - 32.9 PG  
 MCHC 34.4 31.4 - 35.0 g/dL  
 RDW 12.1 % PLATELET 556 283 - 997 K/uL MPV 8.8 (L) 9.4 - 12.3 FL ABSOLUTE NRBC 0.00 0.0 - 0.2 K/uL  
 DF AUTOMATED NEUTROPHILS 67 43 - 78 % LYMPHOCYTES 25 13 - 44 % MONOCYTES 8 4.0 - 12.0 % EOSINOPHILS 0 (L) 0.5 - 7.8 % BASOPHILS 1 0.0 - 2.0 % IMMATURE GRANULOCYTES 0 0.0 - 5.0 %  
 ABS. NEUTROPHILS 3.7 1.7 - 8.2 K/UL  
 ABS. LYMPHOCYTES 1.4 0.5 - 4.6 K/UL  
 ABS. MONOCYTES 0.4 0.1 - 1.3 K/UL  
 ABS. EOSINOPHILS 0.0 0.0 - 0.8 K/UL  
 ABS. BASOPHILS 0.0 0.0 - 0.2 K/UL  
 ABS. IMM. GRANS. 0.0 0.0 - 0.5 K/UL METABOLIC PANEL, COMPREHENSIVE Collection Time: 09/03/18  5:57 PM  
Result Value Ref Range Sodium 140 136 - 145 mmol/L Potassium 3.9 3.5 - 5.1 mmol/L Chloride 104 98 - 107 mmol/L  
 CO2 27 21 - 32 mmol/L Anion gap 9 7 - 16 mmol/L Glucose 99 65 - 100 mg/dL BUN 17 6 - 23 MG/DL Creatinine 0.80 0.8 - 1.5 MG/DL  
 GFR est AA >60 >60 ml/min/1.73m2 GFR est non-AA >60 >60 ml/min/1.73m2 Calcium 8.6 8.3 - 10.4 MG/DL Bilirubin, total 0.4 0.2 - 1.1 MG/DL  
 ALT (SGPT) 25 12 - 65 U/L  
 AST (SGOT) 20 15 - 37 U/L Alk. phosphatase 56 50 - 136 U/L Protein, total 8.2 6.3 - 8.2 g/dL Albumin 4.1 3.5 - 5.0 g/dL Globulin 4.1 (H) 2.3 - 3.5 g/dL A-G Ratio 1.0 (L) 1.2 - 3.5 Xr Chest Pa Lat Result Date: 9/3/2018 CHEST X-RAY, 2 views. HISTORY:  Drug use and request for detoxification. TECHNIQUE: PA and lateral views. COMPARISON: March 2018. FINDINGS: The lungs are clear. The heart size is normal. The costophrenic angles are sharp. The pulmonary vasculature is unremarkable. Included portion of the upper abdomen is unremarkable. IMPRESSION: Negative for acute abnormality. 25 yo male requesting drug detox: 
  
Patient very well appearing and NAD at this time.   I spoke with  The on call provider for [address] [place] [address] who was very helpful and came to the emergency department tonight to see the patient and is helping to have patient placed either in [facility] or a facility in [facility] tomorrow morning for detox and then will continue to follow patient for long term management. Of note patient did have overdose at [facility] 2 weeks ago which he now states was unintentional and states no SI or HI at this time so will not place on white papers however I will hold patient overnight for social work and further management in with [address] in AM for placement. Of note patient became paranoid in ED stating he wanted to leave because \"there is too much going on\". He states he struggles with paranoid thoughts. Still adamantly denies any SI or HI however will have psychiatry see patient for further recommendations for management of paranoid thoughts. Patient decided to stay, given ativan, and will see social work tomorrow as well as [facility] for assistance in rehab placement. Psychiatric consultation obtained  For management recommendations given the patient's  Paranoid disposition.   Psychiatrist did not recommend admission to psychiatric facility did recommend an Ativan prior to being released to the drug treatment program.

## 2018-09-03 NOTE — DISCHARGE INSTRUCTIONS
AS WE DISCUSSED, YOU NEED TO FOLLOW UP AS DISCUSSED WITH THE INDIVIDUAL YOU SPOKE WITH FROM [facility] TO RECEIVE APPROPRIATE REHABILITATION SERVICES. FURTHER- IF YOU DEVELOP ANY THOUGHTS OF HURTING YOURSELF OR ANYBODY ELSE, OR PARTICULARLY ANY THOUGHTS OF OVERDOSING INTENTIONALLY OR THINK YOU MAY ACCIDENTALLY THEN PLEASE RETURN IMMEDIATELY TO THE EMERGENCY DEPARTMENT. Alcohol and Drug Problems: Care Instructions  Your Care Instructions    You can improve your life and health by stopping your use of alcohol or drugs. Ending dependency on alcohol or drugs may help you feel and sleep better. You may get along better with your family, friends, and coworkers. There are medicines and programs that can help. Follow-up care is a key part of your treatment and safety. Be sure to make and go to all appointments, and call your doctor if you are having problems. It's also a good idea to know your test results and keep a list of the medicines you take. How can you care for yourself at home? · If you have been given medicine to help keep you sober or reduce your cravings, be sure to take it as prescribed. · Talk to your doctor about programs that can help you stop using drugs or drinking alcohol. · If your doctor prescribes disulfiram (Antabuse), do not drink any alcohol while you are taking this medicine. You may have severe, even life-threatening, side effects from even small amounts of alcohol. · Do not tempt yourself by keeping alcohol or drugs in your home. · Learn how to say no when other people drink or use drugs. Or don't spend time with people who drink or use drugs. · Use the time and money spent on drinking or drugs to do something fun with your family or friends. Preventing a relapse  · Do not drink alcohol or use drugs at all. Using any amount of alcohol or drugs greatly increases your risk for relapse.   · Seek help from organizations such as Alcoholics Anonymous, Narcotics Anonymous, or treatment facilities if you feel the need to drink alcohol or use drugs again. · Remember that recovery is a lifelong process. · Stay away from situations, friends, or places that may lead you to drink or use drugs. · Have a plan to spot and deal with relapse. Learn to recognize changes in your thinking that lead you to drink or use drugs. These are warning signs. Get help before you start to drink or use drugs again. · Get help as soon as you can if you relapse. Some people make a plan with another person that outlines what they want that person to do for them if they relapse. The plan usually includes how to handle the relapse and who to notify in case of relapse. · Don't give up. Remember that a relapse does not mean that you have failed. Use the experience to learn the triggers that lead you to drink or use drugs. Then quit again. Many people have several relapses before they are able to quit for good. When should you call for help? Call 911 anytime you think you may need emergency care. For example, call if:    · You feel you cannot stop from hurting yourself or someone else.   [facility] your doctor now or seek immediate medical care if:    · You have serious withdrawal symptoms such as confusion, hallucinations, or severe trembling.    Watch closely for changes in your health, and be sure to contact your doctor if:    · You have a relapse.     · You need more help or support to stop. Where can you learn more? Go to http://laly-jurgen.info/. Enter [id number] in the search box to learn more about \"Alcohol and Drug Problems: Care Instructions. \"  Current as of: October 9, 2017  Content Version: 11.7  © 7052-3120 [employer]. Care instructions adapted under license by [employer] (which disclaims liability or warranty for this information).  If you have questions about a medical condition or this instruction, always ask your healthcare professional. Latisha Marroquin disclaims any warranty or liability for your use of this information.

## 2018-09-03 NOTE — ED TRIAGE NOTES
Pt arrived via [facility]. Pt states he used meth earlier today and ems states the pt did heroin last night. Pt states he wants to go to detox again.

## 2018-09-04 PROCEDURE — 74011250637 HC RX REV CODE- 250/637: Performed by: EMERGENCY MEDICINE

## 2018-09-04 RX ORDER — LORAZEPAM 1 MG/1
1 TABLET ORAL
Status: COMPLETED | OUTPATIENT
Start: 2018-09-04 | End: 2018-09-04

## 2018-09-04 RX ADMIN — LORAZEPAM 1 MG: 1 TABLET ORAL at 01:52

## 2018-09-04 RX ADMIN — LORAZEPAM 1 MG: 1 TABLET ORAL at 06:35

## 2018-09-04 NOTE — ED NOTES
I have reviewed discharge instructions with the patient. The patient verbalized understanding. Patient left ED via Discharge Method: ambulatory to follow up with the [facility] with self. Opportunity for questions and clarification provided. Patient given 0 scripts. To continue your aftercare when you leave the hospital, you may receive an automated call from our care team to check in on how you are doing. This is a free service and part of our promise to provide the best care and service to meet your aftercare needs.  If you have questions, or wish to unsubscribe from this service please call [phone]. Thank you for Choosing our [facility] Emergency Department.

## 2018-09-04 NOTE — ED NOTES
Patient is not on commitment papers and is being somewhat disruptive in the room. Patient is waiting to see  and is not receiving any medical care at this time. He will be placed in the lobby and if he would like to wait to speak with  and [facility] he can otherwise he is free to go.

## 2018-09-04 NOTE — ED NOTES
Pt sts \"I don't think this is any better. I don't feel safe here\". Pt is visibly agitated and restless. Pt refuses to sit down on bed and keeps pacing.  Pt sts \"I think I like the other room better than this one\" Pt sts he may feel better being in a leija bed where he doesn't feel so closed in

## 2021-05-17 NOTE — PROGRESS NOTES
IR cannot do biopsy today due to pt receiving dialysis today. Pt is rescheduled for tomorrow around 8. Physical Therapy     Referred by: Edgar Duron MD; Medical Diagnosis (from order):    Diagnosis Information      Diagnosis    V54.81, V43.64 (ICD-9-CM) - Z47.1, Z96.642 (ICD-10-CM) - Aftercare following left hip joint replacement surgery                Discharge Summary    Visit:  13     SUBJECTIVE                                                                                                             PATIENT REPORTED MOWED THE LAWN AGAIN, PLANTED FLOWERS, OTHER YARD WORK. SAT IN HOT TUB AND REALLY HELPED. DOING BETTER WITH ACTIVITY.  WALKING MORE  Current functional limitations: TRIED TO SLEEP IN BED, ACHED.    Pain / Symptoms:  Pain rating (out of 10): Current: 2 Location: L HIP   Quality / Description: ache.  Alleviating Factors: change in position.     OBJECTIVE                                                                                                                       Range of Motion (ROM)   (degrees unless noted; active unless noted; norms in ( ); negative=lacking to 0, positive=beyond 0)   Hip:      - Flexion (100-120):        • Left: 90 Passive: 90  Details / Comments: INCR FLEX    STRENGTH- ABLE TO DO HIP EX WITH 4# AT ANKLES.              Outcome Measures:   Lower Extremity Functional Scale: LEFS Calculated Total: 48 (0=extreme difficulty; 80=no difficulty) see flowsheet for additional documentation    TREATMENT                                                                                                                  Therapeutic Exercise:  NUSTEP- L7 SLOW 10 min  GAIT FWD/BK PARALLEL BARS  BLK BAND X 5 MIN  HIP EXT L/R 4# X 20  SIDE STEP L/R  X 20  STEP UP  FWD/LAT 6\" X 30  MARCHES STRETCH R/L X 3 MIN  ADD SETS BLUE BALL X 3 MIN  HIP ABD YOSELIN RED BAND SEATED X 40      Manual Therapy:    STM L ITB/GLUT/SCAR      Skilled input: inhibition and verbal instruction/cues    Writer verbally educated and received verbal consent for hand placement, positioning of patient, and techniques to  be performed today from patient for therapist position for techniques, hand placement and palpation for techniques and clothing adjustments for techniques as described above and how they are pertinent to the patient's plan of care.    Home Exercise Program: ALL LE EX AND INCR WALKING    Moist Heat ([id number])  Location: L ANT HIP  Position: sitting  Duration: 10 minutes  Results: decreased pain and tissue softening  Reaction: no adverse reaction to treatment      ASSESSMENT                                                                                                             INCR EX. ABLE TO DO MORE ACTIVITY. DOING WELL WITH PROGRESS. RETURNED TO WORK, SIT A LOT NOW. NEEDS TO GET UP OFTEN.  Pain/symptoms after session (out of 10): 1    To date the patient has made gains as expected as reported. Patient continues to have impairments and functional deficits as noted.  Patient will continue to benefit from skilled care as outlined.  Patient Education:   Results of above outlined education: Verbalizes understanding      PLAN                                                                                                                           Updates to plan of care: continue current plan of care    Suggestions for next session as indicated: Progress per plan of care       GOALS                                                                                                                           Long Term Goals: to be met by end of plan of care  1. ALL GOALS-    STAND- 20 MIN FOR ALDS EQUAL WB-- MET    SIT- 30 MIN MIN DIFF FOR MEALS--MET    LAY DOWN- IN OWN BED EACH NIGHT.--40% MET    WALK-WITH SC INDOORS LEVEL SURFACE 200 FT--MET INCR TO OUTSIDE WITH CANE 4 BLOCKS LEVEL SURFACE.--CONT WORKING        Therapy procedure time and total treatment time can be found documented on the Time Entry flowsheet

## 2022-04-13 NOTE — PROGRESS NOTES
Quality 226: Preventive Care And Screening: Tobacco Use: Screening And Cessation Intervention: Patient screened for tobacco use and is an ex/non-smoker Tunneled dialysis catheter removed by Keenan Pretty PA-C. Site without bleeding. Put in for transport back to room. Quality 47: Advance Care Plan: Advance care planning not documented, reason not otherwise specified. Quality 111:Pneumonia Vaccination Status For Older Adults: Pneumococcal Vaccination Previously Received Quality 130: Documentation Of Current Medications In The Medical Record: Current Medications Documented Quality 431: Preventive Care And Screening: Unhealthy Alcohol Use - Screening: Patient screened for unhealthy alcohol use using a single question and scores less than 2 times per year Quality 110: Preventive Care And Screening: Influenza Immunization: Influenza Immunization Administered during Influenza season Detail Level: Detailed Quality 431: Preventive Care And Screening: Unhealthy Alcohol Use - Screening: Patient not identified as an unhealthy alcohol user when screened for unhealthy alcohol use using a systematic screening method